# Patient Record
Sex: FEMALE | Race: WHITE | NOT HISPANIC OR LATINO | Employment: OTHER | URBAN - METROPOLITAN AREA
[De-identification: names, ages, dates, MRNs, and addresses within clinical notes are randomized per-mention and may not be internally consistent; named-entity substitution may affect disease eponyms.]

---

## 2017-05-03 ENCOUNTER — GENERIC CONVERSION - ENCOUNTER (OUTPATIENT)
Dept: OTHER | Facility: OTHER | Age: 67
End: 2017-05-03

## 2017-07-05 ENCOUNTER — GENERIC CONVERSION - ENCOUNTER (OUTPATIENT)
Dept: OTHER | Facility: OTHER | Age: 67
End: 2017-07-05

## 2017-10-16 ENCOUNTER — GENERIC CONVERSION - ENCOUNTER (OUTPATIENT)
Dept: OTHER | Facility: OTHER | Age: 67
End: 2017-10-16

## 2017-11-15 ENCOUNTER — GENERIC CONVERSION - ENCOUNTER (OUTPATIENT)
Dept: OTHER | Facility: OTHER | Age: 67
End: 2017-11-15

## 2017-11-27 ENCOUNTER — ALLSCRIPTS OFFICE VISIT (OUTPATIENT)
Dept: OTHER | Facility: OTHER | Age: 67
End: 2017-11-27

## 2017-11-27 DIAGNOSIS — E55.9 VITAMIN D DEFICIENCY: ICD-10-CM

## 2017-11-27 DIAGNOSIS — R10.9 ABDOMINAL PAIN: ICD-10-CM

## 2017-11-27 DIAGNOSIS — E78.5 HYPERLIPIDEMIA: ICD-10-CM

## 2017-11-28 NOTE — PROGRESS NOTES
Assessment    1  NUD (nonulcer dyspepsia) (536 8) (K30)  2  Joint pain, knee (719 46) (M25 569)  3  Hyperlipidemia (272 4) (E78 5)  4  Vitamin D deficiency (268 9) (E55 9)  5  Need for influenza vaccination (V04 81) (Z23)  6  Need for pneumococcal vaccination (V03 82) (Z23)   1  Health maintenance-defers an influenza vaccine today but says she will return for over the next several weeks 2  Health maintenance-should also receive pneumococcal 23 vaccine at the time of her influenza vaccine 3  Health maintenance-again recommended she obtain herpes zoster vaccine-she has a prescription for this 4  Knee pain-saw Dr Angel Zimmer  MRI of left knee show severe patellofemoral arthritis, moderate to severe medial medial arthritis with some AVM along larger weight-bearing surface of the medial femoral condyle in some bone edema  She had a Visco elastic injection  She wants to resume swimming  She will be deferring surgery until she has more pain  We talked about swimming and knee strengthening exercises 5  Abnormal mammogram-ultrasound the right breast on April 2017 pgzvmggqb-[id number]-cy mammogram in July 17 shows stable mass in the right breast compatible with mildly complicated/septated cyst-he recommended mammogram-ultrasound in the year which will be due in July 2018 6  Dyspepsia-had recent ultrasound gallbladder and pancreas via GI doctor was normal  Supposedly endoscopy and colonoscopy normal  We will be obtaining results of that with further therapy based on results 7  Low vitamin-D-again recommended vitamin D3/2000 units a day 8 history mild allergies as well as some associated low-grade asthma  Does have exercise-induced asthma  I previously offered her rescue in her low but she deferred #9 history of ovarian cyst-as per GYN #10 family history cancer the colon-her mother-last colonoscopy over 5 years ago   As noted had recent colonoscopy with Dr Jamie Kaye and tracking down results #11 herpes simplex-intermittent uses Valtrex #12 history of lower abdominal pain-resolved that she is always fearful ovarian cancer  Prior serum CA-125 neida #13 leg edema-felt to be from peripheral venous disease  Encouraged her to use support stockings  MEDICAL REGIMEN: vitamin D 3/2000 units a day l  Appointment 12 months with prior chemistry profile cholesterol profile vitamin-D level  Addendum-obtain results from Dr India Rocha in November 2017 shows normal esophagus, nonerosive gastritis and normal duodenum  Colonoscopy normal with repeat recommended in 5 YEARS-DAVID CHART NEXT VISIT     Plan  Influenza vaccine recommended  Pneumococcal 23 vaccine recommended  Herpes zoster vaccine recommended  Call if increasing upper tract symptoms  Vitamin D3 dose of 2000 units a day recommended   History of Present Illness  HPI: Reviewed multiple issues  She was having intermittent dyspepsia with certain foods  She said she would have bloating and fullness without definite pain  She had an ultrasound performed by Dr Roxana Zazueta was total was benign  She had an endoscopy colonoscopy was told both of these were benign  She says at this point her symptoms are overall improved not have frequent issue  She has occasional pyrosis but not frequently  She denies difficulty swallowing  testing shows chemistry profile normal HDL 98 LDL 85 triglycerides 36 vitamin-D level 23 CBC normal  She has not been faithful in vitamin-D supplementation will initiate that  talked about degenerative arthritis of her knees  Says she has minimal pain  She was told by 2 orthopedic physician she will need a total knee replacement in the future-she wants to hold off on that at present   had follow-up radiographic studies done  Ultrasound the right breast done in April 2017 unchanged  Mammogram done in July 2017 showed stable mass of the right breast compatible with mildly complicated/septated cyst versus a group of CIS   Follow-up mammogram recommended in 1 year which would be July 2018      has a history of some seasonal allergies  Not a major issue  Also has exercise-induced asthma  I previously offered her rescue inhaler but she deferred  patient denies any systemic symptoms  Specifically there has been no evidence of fever, night sweats, significant weight loss or significant decrease in appetite  patient wanted to know their preferred analgesic agent  Because of their various comorbidities I recommended that this be acetaminophen  This patient has no history of chronic liver disease that would put them at greater risk for use of acetaminophen  This patient may use up to 500-650 mg of acetaminophen at a time and no more than 3 g a day total  Nonsteroidal anti-inflammatory agents have the potential to exacerbate hypertension, hypercoagulability, chronic renal failure, congestive heart failure, and various allergic tendencies  Because of her comorbidities and upper tract symptoms we wanted to use acetaminophen rather than nonsteroidals      Active Problems  1  Abdominal pain (789 00) (R10 9)  2  Asthma (493 90) (J45 909)  3  Cough (786 2) (R05)  4  Edema (782 3) (R60 9)  5  Fatigue (780 79) (R53 83)  6  Hyperlipidemia (272 4) (E78 5)  7  Ichthyosis (757 1) (Q80 9)  8  Joint pain, knee (719 46) (M25 569)  9  Low back pain (724 2) (M54 5)  10  Lumbar radiculopathy (724 4) (M54 16)  11  Need for influenza vaccination (V04 81) (Z23)  12  Need for pneumococcal vaccination (V03 82) (Z23)  13  NUD (nonulcer dyspepsia) (536 8) (K30)  14  Palpitations (785 1) (R00 2)  15  Prehypertension (796 2) (R03 0)  16  Vitamin D deficiency (268 9) (E55 9)    Past Medical History  1  History of Long term use of drug (V58 69) (Z79 899)  2  History of Pertussis exposure (V01 89) (Z20 818)  Active Problems And Past Medical History Reviewed: The active problems and past medical history were reviewed and updated today  Surgical History  Surgical History Reviewed:    The surgical history was reviewed and updated today  Family History  Mother   1  Family history of Cancer, colon    Social History     · Being A Social Drinker   · Drug Use (305 90)   · Former smoker (B29 30) (Z21 361)  Social History Reviewed: The social history was reviewed and updated today  The social history was reviewed and is unchanged  Current Meds  1  Azithromycin 250 MG Oral Tablet (Zithromax Z-Hari); TAKE AS DIRECTED; Therapy: 02BCH2845 to (Last Rx:93Ncm7357)  Requested for: 69Sog9057 Ordered  2  DrRx Medrol Dose Pack 4 MG #21; use as directed; Therapy: 63GXP3704 to (Last Rx:40Dcx1566) Ordered  Medication List Reviewed: The medication list was reviewed and updated today  Allergies  1  No Known Drug Allergies    Vitals  Vital Signs    Recorded: 75FBP8860 10:37AM Recorded: 40VNT3573 10:08AM   Heart Rate 78    Respiration 14    Systolic 847, RUE, Sitting    Diastolic 78, RUE, Sitting    Height  5 ft 6 in   Weight  159 lb 4 oz   BMI Calculated  25 7   BSA Calculated  1 82       Physical Exam   Constitutional  General appearance: No acute distress, well appearing and well nourished  Head and Face  Head and face: Normal    Palpation of the face and sinuses: No sinus tenderness  Eyes  Conjunctiva and lids: No swelling, erythema or discharge  Pupils and irises: Equal, round, reactive to light  Ears, Nose, Mouth, and Throat  External inspection of ears and nose: Normal    Otoscopic examination: Tympanic membranes translucent with normal light reflex  Canals patent without erythema  Hearing: Normal    Nasal mucosa, septum, and turbinates: Normal without edema or erythema  Lips, teeth, and gums: Normal, good dentition  Oropharynx: Normal with no erythema, edema, exudate or lesions  Neck  Neck: Supple, symmetric, trachea midline, no masses  Thyroid: Normal, no thyromegaly  Pulmonary  Respiratory effort: No increased work of breathing or signs of respiratory distress     Percussion of chest: Normal    Palpation of chest: Normal    Auscultation of lungs: Clear to auscultation  Cardiovascular  Palpation of heart: Normal PMI, no thrills  Auscultation of heart: Normal rate and rhythm, normal S1 and S2, no murmurs  Carotid pulses: 2+ bilaterally  Abdominal aorta: Normal    Femoral pulses: 2+ bilaterally  Pedal pulses: 2+ bilaterally  Peripheral vascular exam: Normal    Examination of extremities for edema and/or varicosities: Abnormal  -- Peripheral venous disease  Chest  Breasts: Normal, no dimpling or skin changes appreciated  Palpation of breasts and axillae: Normal, no masses palpated  Abdomen  Abdomen: Non-tender, no masses  Liver and spleen: No hepatomegaly or splenomegaly  Examination for hernias: No hernia appreciated  Lymphatic  Palpation of lymph nodes in neck: No lymphadenopathy  Palpation of lymph nodes in axillae: No lymphadenopathy  Palpation of lymph nodes in groin: No lymphadenopathy  Palpation of lymph nodes in other areas: No lymphadenopathy  Musculoskeletal  Gait and station: Normal    Digits and nails: Normal without clubbing or cyanosis  Joints, bones, and muscles: Abnormal  -- Small cool effusion of the left knee with crepitance on range of motion of both knees  Negative straight leg raise bilaterally  Range of motion: Normal    Stability: Normal    Muscle strength/tone: Normal    Skin  Skin and subcutaneous tissue: Normal without rashes or lesions  Examination of the skin for lesions: Abnormal  -- Benign seborrheic keratoses-ichthyosis  Palpation of skin and subcutaneous tissue: Normal turgor  Neurologic  Cranial nerves: Cranial nerves II-XII intact  Cortical function: Normal mental status  Reflexes: 2+ and symmetric  Sensation: No sensory loss  Coordination: Normal finger to nose and heel to shin  Psychiatric  Judgment and insight: Normal    Orientation to person, place, and time: Normal    Recent and remote memory: Intact     Mood and affect: Normal  Health Management  History of Encounter for screening mammogram for breast cancer   Digital Bilateral Screening Mammogram With CAD; every 1 year; Next Due: 28QRD8483; Overdue  History of Screening for colorectal cancer   COLONOSCOPY; every 10 years; Next Due: 31Hfx1559;  Overdue    Signatures   Electronically signed by : Rudi Dubin, M D ; Nov 27 2017 10:46AM EST                       (Author)    Electronically signed by : Rudi Dubin, M D ; Nov 30 2017  1:28PM EST                       (Author)

## 2018-01-13 VITALS
WEIGHT: 159.25 LBS | HEART RATE: 78 BPM | HEIGHT: 66 IN | SYSTOLIC BLOOD PRESSURE: 120 MMHG | BODY MASS INDEX: 25.59 KG/M2 | RESPIRATION RATE: 14 BRPM | DIASTOLIC BLOOD PRESSURE: 78 MMHG

## 2018-11-27 ENCOUNTER — OFFICE VISIT (OUTPATIENT)
Dept: INTERNAL MEDICINE CLINIC | Facility: CLINIC | Age: 68
End: 2018-11-27
Payer: MEDICARE

## 2018-11-27 VITALS
SYSTOLIC BLOOD PRESSURE: 118 MMHG | DIASTOLIC BLOOD PRESSURE: 86 MMHG | HEART RATE: 63 BPM | BODY MASS INDEX: 25.62 KG/M2 | OXYGEN SATURATION: 99 % | WEIGHT: 163.2 LBS | HEIGHT: 67 IN | RESPIRATION RATE: 14 BRPM

## 2018-11-27 DIAGNOSIS — Z23 NEED FOR PROPHYLACTIC VACCINATION WITH STREPTOCOCCUS PNEUMONIAE (PNEUMOCOCCUS) AND INFLUENZA VACCINES: ICD-10-CM

## 2018-11-27 DIAGNOSIS — E55.9 VITAMIN D DEFICIENCY: ICD-10-CM

## 2018-11-27 DIAGNOSIS — J30.89 ENVIRONMENTAL AND SEASONAL ALLERGIES: ICD-10-CM

## 2018-11-27 DIAGNOSIS — Z13.29 SCREENING FOR THYROID DISORDER: ICD-10-CM

## 2018-11-27 DIAGNOSIS — Z23 INFLUENZA VACCINE NEEDED: Primary | ICD-10-CM

## 2018-11-27 DIAGNOSIS — Z13.1 SCREENING FOR DIABETES MELLITUS: ICD-10-CM

## 2018-11-27 DIAGNOSIS — Z13.220 SCREENING FOR HYPERLIPIDEMIA: ICD-10-CM

## 2018-11-27 PROCEDURE — G0008 ADMIN INFLUENZA VIRUS VAC: HCPCS

## 2018-11-27 PROCEDURE — 99214 OFFICE O/P EST MOD 30 MIN: CPT | Performed by: INTERNAL MEDICINE

## 2018-11-27 PROCEDURE — G0009 ADMIN PNEUMOCOCCAL VACCINE: HCPCS

## 2018-11-27 PROCEDURE — 90662 IIV NO PRSV INCREASED AG IM: CPT

## 2018-11-27 PROCEDURE — 90732 PPSV23 VACC 2 YRS+ SUBQ/IM: CPT

## 2018-11-27 NOTE — PROGRESS NOTES
Assessment/Plan:    #Seborrheic Keratosis  -noted on b/l temporal areas    #Verruca  -noted on left temporal area with mild pain and discomfort  -scheduled to see dermatology for evaluation in January    #B/L thumb Weakness  -states that weakness started months ago  -has difficulty opening up a jar  -was a former   -states that there is no pain or numbness  -offered to obtain XR however patient states that it is likely arthritis and she will notify us if it worsens    #Left Knee Osteoarthritis  -evaluated by ortho in the past  -received injection 2 years ago  -plans to go back to ortho for injection  -states she is trying to defer surgery for as long as possible due to concern over joint complications including infection    #Vitamin D Deficiency  -low at 21  -takes OTC supplement  -advised patient to take 1000 units daily in addition to current supplementation    #Seasonal Allergies  -reports chronic runny nose  -will obtain allergy panel    #HLD  -LDL 96 and   -controlled on omega 3  -patinet reports diarrhea with omega 3 and informed her to cut down on omega 3    #Health Maintenance  -return to care in 1 year with routine labs  -mammogram performed 2018 and up to date along with US of breast  -sees Ob/gyn for annual pap and pelvic  -colonoscopy and EGD done 2017 and due in 2022  -pneumovax 23 given 2018  -fu vaccine up to date 2018  -shingrix vaccine awaiting inventory at pharmacy  -deferred on DEXA scan, will need to address at next visit    No problem-specific Assessment & Plan notes found for this encounter  Diagnoses and all orders for this visit:    Influenza vaccine needed  -     CBC and differential; Future  -     Vitamin D 25 hydroxy; Future  -     Lipid Panel with Direct LDL reflex; Future  -     Hemoglobin A1C; Future  -     TSH, 3rd generation with Free T4 reflex; Future  -     Comprehensive metabolic panel;  Future  -     influenza vaccine, 6032-1350, high-dose, PF 0 5 mL, for patients 72 yr+ (FLUZONE HIGH-DOSE)    Screening for diabetes mellitus  -     CBC and differential; Future  -     Vitamin D 25 hydroxy; Future  -     Lipid Panel with Direct LDL reflex; Future  -     Hemoglobin A1C; Future  -     TSH, 3rd generation with Free T4 reflex; Future  -     Comprehensive metabolic panel; Future    Screening for hyperlipidemia  -     CBC and differential; Future  -     Vitamin D 25 hydroxy; Future  -     Lipid Panel with Direct LDL reflex; Future  -     Hemoglobin A1C; Future  -     TSH, 3rd generation with Free T4 reflex; Future  -     Comprehensive metabolic panel; Future    Screening for thyroid disorder  -     CBC and differential; Future  -     Vitamin D 25 hydroxy; Future  -     Lipid Panel with Direct LDL reflex; Future  -     Hemoglobin A1C; Future  -     TSH, 3rd generation with Free T4 reflex; Future  -     Comprehensive metabolic panel; Future    Vitamin D deficiency  -     CBC and differential; Future  -     Vitamin D 25 hydroxy; Future  -     Lipid Panel with Direct LDL reflex; Future  -     Hemoglobin A1C; Future  -     TSH, 3rd generation with Free T4 reflex; Future  -     Comprehensive metabolic panel; Future    Environmental and seasonal allergies  -     CBC and differential; Future  -     Vitamin D 25 hydroxy; Future  -     Lipid Panel with Direct LDL reflex; Future  -     Hemoglobin A1C; Future  -     TSH, 3rd generation with Free T4 reflex; Future  -     Comprehensive metabolic panel; Future  -     Allergy Evaluation 1, [place]; Future    Need for prophylactic vaccination with Streptococcus pneumoniae (Pneumococcus) and Influenza vaccines  -     PNEUMOCOCCAL POLYSACCHARIDE VACCINE 23-VALENT =>3YO SQ IM          No current outpatient prescriptions on file  Subjective:      Patient ID: Abeba Cruz is a 80 y o  female  HPI     Patient presents for a routine visit  Denies any recent hospitalizations or surgeries    States that her left knee has been painful however she has been putting off surgery because she is worried about potential complications including infection  She states that she received a joint injection about 2 years ago with mild relief and that we suggested that she return for repeat injection since her knee is still bothering her  She states that she will continue to defer surgery for as long as possible  Patient also complains of bilateral thumb pain and stated that it has been difficult for her to open a container  Muscle strength was intact and tendons appear to be intact  We offered to obtain x-rays however she deferred this stating that she knows that it is likely due to osteoarthritis at this time  She will continue to monitor and inform us if her symptoms worsen  Patient also complains of left lower extremity numbness and neuropathy secondary to a previous lower back injury from lifting up her father in law  States that this is stable and that she worked with physical therapy in the past and has regained significant range of motion and mobility  She will continue to monitor and to continue to exercise on a daily basis  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  and states that she walks every day and does a lot of yard work around [employer]  Patient's LDL is 96 and   She is currently taking Omega 3 fish oil and states that she is having some loose bowel movements  We informed her that she should start either tapering off the fish oil or cut it out completely  She will make a decision regarding what she wants to do later on  Patient also states that she continues take vitamin-D multivitamin however her vitamin-D was noted to be 21 and we informed her to take a daily supplement of 1000 units daily  In 2017 she had a colonoscopy and an upper endoscopy without any significant findings  She is due for repeat in 2022   She states that she has a history of gastritis and that soda and citrus fruits bother her however by avoiding these things her symptoms go away  Patient also complains of chronic sinusitis and sinus drainage and we will obtain an immuno cap  She states that she had a bird in the past and though she was allergic to that however after the her bird , the symptoms did not significantly improve  Patient also was noted to have verruca noted on her left temporal area and she plans to see Dermatology in January for evaluation and treatment  There area also seborrheic keratosis on b/l temporal areas as well  Patient states that she has never received a DEXA scan and will deferred at this time  We informed her the risk of osteoporosis and the risk of spinal fracture and hip fracture as she ages  She is aware of the risks and states that she will consider it at the next visit  She received a flu vaccine and the pneumonia vaccine today  She will return in 1 year with routine labs  The following portions of the patient's history were reviewed and updated as appropriate: allergies, current medications, past family history, past medical history, past social history, past surgical history and problem list     Review of Systems   Constitutional: Negative for activity change, appetite change, chills, fatigue and fever  HENT: Positive for rhinorrhea  Negative for congestion, ear pain, facial swelling, hearing loss, sore throat, tinnitus and trouble swallowing  Eyes: Negative for photophobia and visual disturbance  Respiratory: Negative for cough, shortness of breath and wheezing  Cardiovascular: Negative for chest pain and leg swelling  Gastrointestinal: Negative for abdominal distention, abdominal pain, blood in stool, nausea and vomiting  Genitourinary: Negative for difficulty urinating, dysuria and pelvic pain  Musculoskeletal: Positive for arthralgias (let knee pain) and back pain (lumbar)  Negative for joint swelling  Skin: Positive for rash (rash on b/l sides of head)   Negative for wound  Neurological: Negative for dizziness, tremors, light-headedness and headaches  Objective:      /86 (Cuff Size: Standard)   Pulse 63   Resp 14   Ht 5' 7" (1 702 m)   Wt 74 kg (163 lb 3 2 oz)   SpO2 99%   BMI 25 56 kg/m²          Physical Exam   Constitutional: She is oriented to person, place, and time  She appears well-developed and well-nourished  HENT:   Head: Normocephalic and atraumatic  Right Ear: External ear normal    Left Ear: External ear normal    Nose: Nose normal    Mouth/Throat: Oropharynx is clear and moist    Moist turbinates   Eyes: Pupils are equal, round, and reactive to light  Conjunctivae and EOM are normal    Neck: Normal range of motion  Neck supple  No JVD present  No thyromegaly present  Cardiovascular: Normal rate, regular rhythm, normal heart sounds and intact distal pulses  No murmur heard  Pulmonary/Chest: Effort normal and breath sounds normal  No stridor  No respiratory distress  She has no wheezes  Abdominal: Soft  Bowel sounds are normal  She exhibits no distension  There is no tenderness  There is no rebound and no guarding  Musculoskeletal: Normal range of motion  She exhibits tenderness (left knee medial meniscus, lumbar back)  She exhibits no edema or deformity  Lymphadenopathy:     She has no cervical adenopathy  Neurological: She is alert and oriented to person, place, and time  She has normal reflexes  Skin: Skin is warm  Rash (seborrheic keratosis b/l temporal area, verruca on left temporal area) noted  No erythema  Vitals reviewed

## 2019-04-09 ENCOUNTER — TELEPHONE (OUTPATIENT)
Dept: INTERNAL MEDICINE CLINIC | Facility: CLINIC | Age: 69
End: 2019-04-09

## 2019-04-15 ENCOUNTER — OFFICE VISIT (OUTPATIENT)
Dept: INTERNAL MEDICINE CLINIC | Facility: CLINIC | Age: 69
End: 2019-04-15
Payer: MEDICARE

## 2019-04-15 VITALS
SYSTOLIC BLOOD PRESSURE: 124 MMHG | DIASTOLIC BLOOD PRESSURE: 80 MMHG | HEIGHT: 67 IN | WEIGHT: 165.4 LBS | RESPIRATION RATE: 16 BRPM | HEART RATE: 88 BPM | BODY MASS INDEX: 25.96 KG/M2 | OXYGEN SATURATION: 98 %

## 2019-04-15 DIAGNOSIS — K29.50 OTHER CHRONIC GASTRITIS WITHOUT HEMORRHAGE: ICD-10-CM

## 2019-04-15 DIAGNOSIS — M81.0 AGE-RELATED OSTEOPOROSIS WITHOUT CURRENT PATHOLOGICAL FRACTURE: ICD-10-CM

## 2019-04-15 DIAGNOSIS — Z13.820 SCREENING FOR OSTEOPOROSIS: Primary | ICD-10-CM

## 2019-04-15 PROCEDURE — G0402 INITIAL PREVENTIVE EXAM: HCPCS | Performed by: INTERNAL MEDICINE

## 2019-04-15 RX ORDER — METHYLPREDNISOLONE 4 MG/1
TABLET ORAL
COMMUNITY
Start: 2015-06-16 | End: 2019-11-25

## 2019-04-15 RX ORDER — AZITHROMYCIN 250 MG/1
TABLET, FILM COATED ORAL
COMMUNITY
Start: 2016-02-12 | End: 2019-11-25

## 2019-04-15 RX ORDER — MULTIVIT-MIN/IRON/FOLIC ACID/K 18-600-40
CAPSULE ORAL
COMMUNITY
Start: 2013-12-06 | End: 2019-04-15

## 2019-04-17 ENCOUNTER — TELEPHONE (OUTPATIENT)
Dept: INTERNAL MEDICINE CLINIC | Facility: CLINIC | Age: 69
End: 2019-04-17

## 2019-04-18 RX ORDER — FAMOTIDINE 40 MG/1
40 TABLET, FILM COATED ORAL 2 TIMES DAILY PRN
Qty: 60 TABLET | Refills: 0 | Status: SHIPPED | OUTPATIENT
Start: 2019-04-18 | End: 2019-11-25

## 2019-07-23 LAB — NEGATIVE CONTROL: NEGATIVE

## 2019-11-21 LAB
HBA1C MFR BLD HPLC: 5.6 %
HBA1C MFR BLD HPLC: 5.6 %

## 2019-11-25 ENCOUNTER — OFFICE VISIT (OUTPATIENT)
Dept: INTERNAL MEDICINE CLINIC | Facility: CLINIC | Age: 69
End: 2019-11-25
Payer: MEDICARE

## 2019-11-25 VITALS
WEIGHT: 166.4 LBS | BODY MASS INDEX: 26.12 KG/M2 | HEART RATE: 74 BPM | HEIGHT: 67 IN | RESPIRATION RATE: 15 BRPM | DIASTOLIC BLOOD PRESSURE: 78 MMHG | OXYGEN SATURATION: 98 % | SYSTOLIC BLOOD PRESSURE: 120 MMHG

## 2019-11-25 DIAGNOSIS — B00.9 HERPES INFECTION: ICD-10-CM

## 2019-11-25 DIAGNOSIS — Z13.29 SCREENING FOR THYROID DISORDER: ICD-10-CM

## 2019-11-25 DIAGNOSIS — Z23 INFLUENZA VACCINE NEEDED: ICD-10-CM

## 2019-11-25 DIAGNOSIS — Z01.419 ENCOUNTER FOR ANNUAL ROUTINE GYNECOLOGICAL EXAMINATION: ICD-10-CM

## 2019-11-25 DIAGNOSIS — E78.2 MODERATE MIXED HYPERLIPIDEMIA NOT REQUIRING STATIN THERAPY: ICD-10-CM

## 2019-11-25 DIAGNOSIS — E55.9 VITAMIN D DEFICIENCY: Primary | ICD-10-CM

## 2019-11-25 PROCEDURE — G0008 ADMIN INFLUENZA VIRUS VAC: HCPCS

## 2019-11-25 PROCEDURE — G0439 PPPS, SUBSEQ VISIT: HCPCS

## 2019-11-25 PROCEDURE — 90662 IIV NO PRSV INCREASED AG IM: CPT

## 2019-11-25 RX ORDER — ACYCLOVIR 400 MG/1
400 TABLET ORAL 3 TIMES DAILY
Qty: 90 TABLET | Refills: 1 | Status: SHIPPED | OUTPATIENT
Start: 2019-11-25 | End: 2019-12-25

## 2019-11-25 NOTE — PROGRESS NOTES
Assessment/Plan:    #Right Temporal Head Pain  -reports dog jumped up and hit her face along right temporal area  -denies LOC  -currently improving  -to continue with ice therapy    #Right Foot Tendinitis  -present since July and saw orthopedics  -fitted for orthotic and bracing with relief only to trip in a hole and reinjure  -reports symptoms are slowly improving again    #Epigastric Abdominal Pain  -pressure like with bloating  -worsens after meals  -2017 colonoscopy and EGD were normal  -currently resolved with avoiding acidic foods    #Epigastric Fullness  -improved with decreasing portion sizes    #Seborrheic Keratosis  -noted on b/l temporal areas    #Verruca  -on left temporal area  -seeing dermatology    #B/L thumb Weakness  -states that weakness started months ago  -has difficulty opening up a jar  -was a former   -weakness persists but is unchanged    #Left Knee Osteoarthritis  -evaluated by ortho in the past  -received injection 2 years ago  -reports symptoms are controlled without need for injections at this time    #Vitamin D Deficiency  -noncompliant with vitamin D  -will recheck level    #Prediabtes  -a1c at 5 6  -stable    #Seasonal Allergies  -reports chronic runny nose  -allergy panel was negative    #HLD  - and HDL 93  -no longer on omega 3    #Health Maintenance  -return to care in 1 year with routine labs  -mammogram performed 2019 and up to date along with US of breast  -referral for Ob/gyn for annual pap and pelvic given  -colonoscopy and EGD done 2017 and due in 2022  -pneumovax 23 given 2018, PNA 13 2016  -fu vaccine up to date 2019  -shingrix vaccine script given to patient  -patient defers DEXA    No problem-specific Assessment & Plan notes found for this encounter  Diagnoses and all orders for this visit:    Vitamin D deficiency  -     CBC and differential; Future  -     Lipid Panel with Direct LDL reflex;  Future  -     TSH, 3rd generation with Free T4 reflex; Future  -     Comprehensive metabolic panel; Future  -     Vitamin D 25 hydroxy; Future    Moderate mixed hyperlipidemia not requiring statin therapy  -     CBC and differential; Future  -     Lipid Panel with Direct LDL reflex; Future  -     TSH, 3rd generation with Free T4 reflex; Future  -     Comprehensive metabolic panel; Future  -     Vitamin D 25 hydroxy; Future    Screening for thyroid disorder  -     CBC and differential; Future  -     Lipid Panel with Direct LDL reflex; Future  -     TSH, 3rd generation with Free T4 reflex; Future  -     Comprehensive metabolic panel; Future  -     Vitamin D 25 hydroxy; Future    Influenza vaccine needed  -     influenza vaccine, [id number], high-dose, PF 0 5 mL (FLUZONE HIGH-DOSE)  -     CBC and differential; Future  -     Lipid Panel with Direct LDL reflex; Future  -     TSH, 3rd generation with Free T4 reflex; Future  -     Comprehensive metabolic panel; Future  -     Vitamin D 25 hydroxy; Future    Encounter for annual routine gynecological examination  -     Ambulatory referral to Gynecology; Future    Other orders  -     Cancel: Mammo screening bilateral w cad; Future            Current Outpatient Medications:     Cholecalciferol 2000 units CAPS, Take 2,000 Units by mouth, Disp: , Rfl:     Subjective:      Patient ID: Taylor Saucedo is a 77 y o  female  GI Problem   The primary symptoms include arthralgias (right foot pain)  Primary symptoms do not include fever, fatigue, abdominal pain, nausea, vomiting, dysuria, myalgias or rash  The illness does not include chills or back pain  Patient presents for routine visit  Denies any recent hospitalizations or surgeries  States that her epigastric abdominal pain has resolved with changing up her diet  She no longer drinks ice tea or any limb any to any acidic foods  She does acknowledge in on bloating sensation whenever she eats a large amount of food    She states that this improves whenever she decreases the amount of food that she is eating  Her symptoms are likely due to a hiatal hernia and we encouraged her to continue to cut back  She had an EGD in the past in 2017 which was normal   Patient complains of right foot tendinitis over the medial malleolus since July  She states that she continues to see follow-up will up with Orthopedics and recently was placed in an orthotic as well as a boot which has been helping  Last week she stepped in a ground home alcohol and re-injured her foot  She states that this week it is feeling much better and she will continue to monitor for any signs of change  Of note patient reports that her son's 85 lb dog jumped up and hit her in the right frontal orbital area  She states that she felt some slight pain over the area however did not pass out or have any changes in vision  She states that it is slightly getting better  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 and as result we refilled her acyclovir  Patient completed routine labs revealing A1c at 6, , HDL 93 and we encouraged her to diet and exercise  She states that her weight has increased up to 166 lb because she cannot walk as well as she used to  We encouraged her to swim or bike for exercise  She received her flu vaccine today she will return to care in 1 year with labs  The following portions of the patient's history were reviewed and updated as appropriate: allergies, current medications, past family history, past medical history, past social history, past surgical history and problem list     Review of Systems   Constitutional: Negative for activity change, appetite change, chills, fatigue and fever  HENT: Negative for congestion, ear pain, facial swelling, hearing loss, sore throat, tinnitus and trouble swallowing  Eyes: Negative for photophobia and visual disturbance  Respiratory: Negative for cough, shortness of breath and wheezing      Cardiovascular: Negative for chest pain, palpitations and leg swelling  Gastrointestinal: Positive for abdominal distention (abdominal fullness)  Negative for abdominal pain, blood in stool, nausea and vomiting  Genitourinary: Negative for difficulty urinating, dysuria and pelvic pain  Musculoskeletal: Positive for arthralgias (right foot pain) and gait problem (mild gait issues with right foot pain)  Negative for back pain, joint swelling, myalgias, neck pain and neck stiffness  Skin: Negative for rash and wound  Neurological: Negative for dizziness, tremors, seizures, weakness, light-headedness, numbness and headaches  Objective:      /78 (BP Location: Left arm, Patient Position: Sitting, Cuff Size: Adult)   Pulse 74   Resp 15   Ht 5' 7" (1 702 m)   Wt 75 5 kg (166 lb 6 4 oz)   SpO2 98%   BMI 26 06 kg/m²          Physical Exam   Constitutional: She is oriented to person, place, and time  She appears well-developed and well-nourished  HENT:   Head: Normocephalic and atraumatic  Right Ear: External ear normal    Left Ear: External ear normal    Nose: Nose normal    Mouth/Throat: Oropharynx is clear and moist    Eyes: Pupils are equal, round, and reactive to light  Conjunctivae and EOM are normal    Neck: Normal range of motion  Neck supple  No JVD present  No thyromegaly present  Cardiovascular: Normal rate, regular rhythm, normal heart sounds and intact distal pulses  No murmur heard  Pulmonary/Chest: Effort normal and breath sounds normal  No stridor  No respiratory distress  She has no wheezes  Abdominal: Soft  Bowel sounds are normal  She exhibits no distension and no mass  There is no tenderness  There is no rebound and no guarding  Musculoskeletal: Normal range of motion  She exhibits edema (trace edema right medial malleolus) and tenderness (right medial malleolus)  She exhibits no deformity  Lymphadenopathy:     She has no cervical adenopathy     Neurological: She is alert and oriented to person, place, and time  She has normal reflexes  Skin: Skin is warm  Rash (verruca noted on left temporal area) noted  No erythema  Vitals reviewed

## 2020-05-20 ENCOUNTER — OFFICE VISIT (OUTPATIENT)
Dept: OBGYN CLINIC | Facility: CLINIC | Age: 70
End: 2020-05-20
Payer: MEDICARE

## 2020-05-20 VITALS
HEIGHT: 67 IN | BODY MASS INDEX: 26.02 KG/M2 | SYSTOLIC BLOOD PRESSURE: 146 MMHG | TEMPERATURE: 98.4 F | WEIGHT: 165.8 LBS | DIASTOLIC BLOOD PRESSURE: 90 MMHG

## 2020-05-20 DIAGNOSIS — R10.2 PELVIC PAIN: Primary | ICD-10-CM

## 2020-05-20 PROCEDURE — 1160F RVW MEDS BY RX/DR IN RCRD: CPT | Performed by: OBSTETRICS & GYNECOLOGY

## 2020-05-20 PROCEDURE — 1036F TOBACCO NON-USER: CPT | Performed by: OBSTETRICS & GYNECOLOGY

## 2020-05-20 PROCEDURE — 99203 OFFICE O/P NEW LOW 30 MIN: CPT | Performed by: OBSTETRICS & GYNECOLOGY

## 2020-05-20 PROCEDURE — 3080F DIAST BP >= 90 MM HG: CPT | Performed by: OBSTETRICS & GYNECOLOGY

## 2020-05-20 PROCEDURE — 4040F PNEUMOC VAC/ADMIN/RCVD: CPT | Performed by: OBSTETRICS & GYNECOLOGY

## 2020-05-20 PROCEDURE — 3008F BODY MASS INDEX DOCD: CPT | Performed by: OBSTETRICS & GYNECOLOGY

## 2020-05-20 PROCEDURE — 3077F SYST BP >= 140 MM HG: CPT | Performed by: OBSTETRICS & GYNECOLOGY

## 2020-05-27 ENCOUNTER — HOSPITAL ENCOUNTER (OUTPATIENT)
Dept: RADIOLOGY | Age: 70
Discharge: HOME | End: 2020-05-27
Payer: MEDICARE

## 2020-05-27 DIAGNOSIS — R10.2 PELVIC PAIN: ICD-10-CM

## 2020-05-27 PROCEDURE — 76856 US EXAM PELVIC COMPLETE: CPT

## 2020-05-27 PROCEDURE — 76830 TRANSVAGINAL US NON-OB: CPT

## 2020-06-01 ENCOUNTER — TELEPHONE (OUTPATIENT)
Dept: OBGYN CLINIC | Facility: CLINIC | Age: 70
End: 2020-06-01

## 2020-10-14 ENCOUNTER — CLINICAL SUPPORT (OUTPATIENT)
Dept: INTERNAL MEDICINE CLINIC | Facility: CLINIC | Age: 70
End: 2020-10-14
Payer: MEDICARE

## 2020-10-14 DIAGNOSIS — Z23 ENCOUNTER FOR IMMUNIZATION: Primary | ICD-10-CM

## 2020-10-14 PROCEDURE — 90662 IIV NO PRSV INCREASED AG IM: CPT

## 2020-10-14 PROCEDURE — G0008 ADMIN INFLUENZA VIRUS VAC: HCPCS

## 2020-11-05 ENCOUNTER — OFFICE VISIT (OUTPATIENT)
Dept: INTERNAL MEDICINE CLINIC | Facility: CLINIC | Age: 70
End: 2020-11-05
Payer: MEDICARE

## 2020-11-05 VITALS
HEART RATE: 80 BPM | OXYGEN SATURATION: 99 % | WEIGHT: 165 LBS | BODY MASS INDEX: 25.9 KG/M2 | HEIGHT: 67 IN | RESPIRATION RATE: 15 BRPM | SYSTOLIC BLOOD PRESSURE: 120 MMHG | DIASTOLIC BLOOD PRESSURE: 60 MMHG

## 2020-11-05 DIAGNOSIS — H66.90 ACUTE OTITIS MEDIA, UNSPECIFIED OTITIS MEDIA TYPE: ICD-10-CM

## 2020-11-05 DIAGNOSIS — H61.23 BILATERAL IMPACTED CERUMEN: ICD-10-CM

## 2020-11-05 DIAGNOSIS — J01.41 ACUTE RECURRENT PANSINUSITIS: Primary | ICD-10-CM

## 2020-11-05 PROCEDURE — 99214 OFFICE O/P EST MOD 30 MIN: CPT | Performed by: INTERNAL MEDICINE

## 2020-11-05 RX ORDER — NEOMYCIN SULFATE, POLYMYXIN B SULFATE AND HYDROCORTISONE 10; 3.5; 1 MG/ML; MG/ML; [USP'U]/ML
4 SUSPENSION/ DROPS AURICULAR (OTIC) EVERY 8 HOURS SCHEDULED
Qty: 10 ML | Refills: 0 | Status: SHIPPED | OUTPATIENT
Start: 2020-11-05 | End: 2021-11-30

## 2020-11-05 RX ORDER — FLUTICASONE PROPIONATE 50 MCG
1 SPRAY, SUSPENSION (ML) NASAL DAILY
Qty: 1 BOTTLE | Refills: 0 | Status: SHIPPED | OUTPATIENT
Start: 2020-11-05

## 2020-11-30 ENCOUNTER — OFFICE VISIT (OUTPATIENT)
Dept: INTERNAL MEDICINE CLINIC | Facility: CLINIC | Age: 70
End: 2020-11-30
Payer: MEDICARE

## 2020-11-30 VITALS
HEIGHT: 67 IN | SYSTOLIC BLOOD PRESSURE: 128 MMHG | OXYGEN SATURATION: 99 % | HEART RATE: 73 BPM | BODY MASS INDEX: 26.06 KG/M2 | TEMPERATURE: 97.8 F | WEIGHT: 166 LBS | DIASTOLIC BLOOD PRESSURE: 90 MMHG

## 2020-11-30 DIAGNOSIS — E78.2 MODERATE MIXED HYPERLIPIDEMIA NOT REQUIRING STATIN THERAPY: ICD-10-CM

## 2020-11-30 DIAGNOSIS — Z00.00 MEDICARE ANNUAL WELLNESS VISIT, SUBSEQUENT: ICD-10-CM

## 2020-11-30 DIAGNOSIS — R19.7 DIARRHEA OF PRESUMED INFECTIOUS ORIGIN: ICD-10-CM

## 2020-11-30 DIAGNOSIS — G89.29 CHRONIC RIGHT SHOULDER PAIN: ICD-10-CM

## 2020-11-30 DIAGNOSIS — M25.511 CHRONIC RIGHT SHOULDER PAIN: ICD-10-CM

## 2020-11-30 DIAGNOSIS — R53.83 FATIGUE, UNSPECIFIED TYPE: ICD-10-CM

## 2020-11-30 DIAGNOSIS — R10.32 LEFT LOWER QUADRANT ABDOMINAL PAIN: Primary | ICD-10-CM

## 2020-11-30 DIAGNOSIS — E55.9 VITAMIN D DEFICIENCY: ICD-10-CM

## 2020-11-30 PROCEDURE — 99214 OFFICE O/P EST MOD 30 MIN: CPT | Performed by: INTERNAL MEDICINE

## 2020-11-30 PROCEDURE — G0439 PPPS, SUBSEQ VISIT: HCPCS | Performed by: INTERNAL MEDICINE

## 2020-11-30 RX ORDER — CIPROFLOXACIN 500 MG/1
500 TABLET, FILM COATED ORAL EVERY 12 HOURS SCHEDULED
Qty: 20 TABLET | Refills: 0 | Status: SHIPPED | OUTPATIENT
Start: 2020-11-30 | End: 2020-12-10

## 2020-11-30 RX ORDER — METRONIDAZOLE 500 MG/1
500 TABLET ORAL EVERY 12 HOURS SCHEDULED
Qty: 20 TABLET | Refills: 0 | Status: SHIPPED | OUTPATIENT
Start: 2020-11-30 | End: 2020-12-10

## 2020-12-02 ENCOUNTER — TELEPHONE (OUTPATIENT)
Dept: INTERNAL MEDICINE CLINIC | Facility: CLINIC | Age: 70
End: 2020-12-02

## 2020-12-16 ENCOUNTER — EVALUATION (OUTPATIENT)
Dept: PHYSICAL THERAPY | Facility: REHABILITATION | Age: 70
End: 2020-12-16
Payer: MEDICARE

## 2020-12-16 DIAGNOSIS — G89.29 CHRONIC RIGHT SHOULDER PAIN: Primary | ICD-10-CM

## 2020-12-16 DIAGNOSIS — M25.511 CHRONIC RIGHT SHOULDER PAIN: Primary | ICD-10-CM

## 2020-12-16 PROCEDURE — 97162 PT EVAL MOD COMPLEX 30 MIN: CPT | Performed by: PHYSICAL THERAPIST

## 2020-12-16 PROCEDURE — 97110 THERAPEUTIC EXERCISES: CPT | Performed by: PHYSICAL THERAPIST

## 2020-12-16 PROCEDURE — 97140 MANUAL THERAPY 1/> REGIONS: CPT | Performed by: PHYSICAL THERAPIST

## 2020-12-21 ENCOUNTER — OFFICE VISIT (OUTPATIENT)
Dept: PHYSICAL THERAPY | Facility: REHABILITATION | Age: 70
End: 2020-12-21
Payer: MEDICARE

## 2020-12-21 DIAGNOSIS — G89.29 CHRONIC RIGHT SHOULDER PAIN: Primary | ICD-10-CM

## 2020-12-21 DIAGNOSIS — M25.511 CHRONIC RIGHT SHOULDER PAIN: Primary | ICD-10-CM

## 2020-12-21 PROCEDURE — 97110 THERAPEUTIC EXERCISES: CPT | Performed by: PHYSICAL THERAPIST

## 2020-12-21 PROCEDURE — 97112 NEUROMUSCULAR REEDUCATION: CPT | Performed by: PHYSICAL THERAPIST

## 2020-12-23 ENCOUNTER — OFFICE VISIT (OUTPATIENT)
Dept: PHYSICAL THERAPY | Facility: REHABILITATION | Age: 70
End: 2020-12-23
Payer: MEDICARE

## 2020-12-23 DIAGNOSIS — G89.29 CHRONIC RIGHT SHOULDER PAIN: Primary | ICD-10-CM

## 2020-12-23 DIAGNOSIS — M25.511 CHRONIC RIGHT SHOULDER PAIN: Primary | ICD-10-CM

## 2020-12-23 PROCEDURE — 97112 NEUROMUSCULAR REEDUCATION: CPT | Performed by: PHYSICAL THERAPIST

## 2020-12-23 PROCEDURE — 97140 MANUAL THERAPY 1/> REGIONS: CPT | Performed by: PHYSICAL THERAPIST

## 2020-12-23 PROCEDURE — 97110 THERAPEUTIC EXERCISES: CPT | Performed by: PHYSICAL THERAPIST

## 2020-12-24 ENCOUNTER — TELEPHONE (OUTPATIENT)
Dept: INTERNAL MEDICINE CLINIC | Facility: CLINIC | Age: 70
End: 2020-12-24

## 2020-12-28 ENCOUNTER — OFFICE VISIT (OUTPATIENT)
Dept: PHYSICAL THERAPY | Facility: REHABILITATION | Age: 70
End: 2020-12-28
Payer: MEDICARE

## 2020-12-28 DIAGNOSIS — M25.511 CHRONIC RIGHT SHOULDER PAIN: Primary | ICD-10-CM

## 2020-12-28 DIAGNOSIS — G89.29 CHRONIC RIGHT SHOULDER PAIN: Primary | ICD-10-CM

## 2020-12-28 PROCEDURE — 97140 MANUAL THERAPY 1/> REGIONS: CPT | Performed by: PHYSICAL THERAPIST

## 2020-12-28 PROCEDURE — 97110 THERAPEUTIC EXERCISES: CPT | Performed by: PHYSICAL THERAPIST

## 2020-12-29 ENCOUNTER — APPOINTMENT (OUTPATIENT)
Dept: LAB | Facility: CLINIC | Age: 70
End: 2020-12-29
Payer: MEDICARE

## 2020-12-29 DIAGNOSIS — R19.7 DIARRHEA OF PRESUMED INFECTIOUS ORIGIN: ICD-10-CM

## 2020-12-29 PROCEDURE — 87505 NFCT AGENT DETECTION GI: CPT

## 2020-12-29 PROCEDURE — 87177 OVA AND PARASITES SMEARS: CPT

## 2020-12-29 PROCEDURE — 87209 SMEAR COMPLEX STAIN: CPT

## 2020-12-30 ENCOUNTER — TELEPHONE (OUTPATIENT)
Dept: INTERNAL MEDICINE CLINIC | Facility: CLINIC | Age: 70
End: 2020-12-30

## 2020-12-30 ENCOUNTER — OFFICE VISIT (OUTPATIENT)
Dept: PHYSICAL THERAPY | Facility: REHABILITATION | Age: 70
End: 2020-12-30
Payer: MEDICARE

## 2020-12-30 ENCOUNTER — TELEMEDICINE (OUTPATIENT)
Dept: INTERNAL MEDICINE CLINIC | Facility: CLINIC | Age: 70
End: 2020-12-30
Payer: MEDICARE

## 2020-12-30 DIAGNOSIS — M25.511 CHRONIC RIGHT SHOULDER PAIN: Primary | ICD-10-CM

## 2020-12-30 DIAGNOSIS — G89.29 CHRONIC RIGHT SHOULDER PAIN: Primary | ICD-10-CM

## 2020-12-30 DIAGNOSIS — A04.72 C. DIFFICILE DIARRHEA: Primary | ICD-10-CM

## 2020-12-30 LAB
C DIFF TOX A+B STL QL IA: POSITIVE
C DIFF TOX B TCDB STL QL NAA+PROBE: POSITIVE
CAMPYLOBACTER DNA SPEC NAA+PROBE: NORMAL
SALMONELLA DNA SPEC QL NAA+PROBE: NORMAL
SHIGA TOXIN STX GENE SPEC NAA+PROBE: NORMAL
SHIGELLA DNA SPEC QL NAA+PROBE: NORMAL

## 2020-12-30 PROCEDURE — 97110 THERAPEUTIC EXERCISES: CPT | Performed by: PHYSICAL THERAPIST

## 2020-12-30 PROCEDURE — 99441 PR PHYS/QHP TELEPHONE EVALUATION 5-10 MIN: CPT | Performed by: INTERNAL MEDICINE

## 2020-12-30 PROCEDURE — 97140 MANUAL THERAPY 1/> REGIONS: CPT | Performed by: PHYSICAL THERAPIST

## 2020-12-30 RX ORDER — VANCOMYCIN HYDROCHLORIDE 125 MG/1
125 CAPSULE ORAL 4 TIMES DAILY
Qty: 40 CAPSULE | Refills: 0 | Status: SHIPPED | OUTPATIENT
Start: 2020-12-30 | End: 2021-01-09

## 2021-01-01 LAB
G LAMBLIA AG STL QL IA: NEGATIVE
O+P STL CONC: NORMAL

## 2021-01-04 ENCOUNTER — TELEPHONE (OUTPATIENT)
Dept: INTERNAL MEDICINE CLINIC | Facility: CLINIC | Age: 71
End: 2021-01-04

## 2021-01-04 ENCOUNTER — APPOINTMENT (OUTPATIENT)
Dept: PHYSICAL THERAPY | Facility: REHABILITATION | Age: 71
End: 2021-01-04
Payer: MEDICARE

## 2021-01-04 NOTE — TELEPHONE ENCOUNTER
PT CALLED, SAID THAT YOU TOLD HER THAT SHE HAS C DIFF   SHE'D LIKE TO KNOW IF SHE CAN STILL GO TO PHYSICAL THERAPY AND ALL HER OTHER APPTS THIS WEEK OR IF SHE'S CONTAGIOUS AND NEEDS TO STAY AWAY FROM PEOPLE     PLEASE ADVISE

## 2021-01-04 NOTE — TELEPHONE ENCOUNTER
Please let her know she is contagious and should avoid those activities until her diarrhea is resolved

## 2021-01-06 ENCOUNTER — APPOINTMENT (OUTPATIENT)
Dept: PHYSICAL THERAPY | Facility: REHABILITATION | Age: 71
End: 2021-01-06
Payer: MEDICARE

## 2021-01-11 ENCOUNTER — APPOINTMENT (OUTPATIENT)
Dept: PHYSICAL THERAPY | Facility: REHABILITATION | Age: 71
End: 2021-01-11
Payer: MEDICARE

## 2021-01-13 ENCOUNTER — APPOINTMENT (OUTPATIENT)
Dept: PHYSICAL THERAPY | Facility: REHABILITATION | Age: 71
End: 2021-01-13
Payer: MEDICARE

## 2021-01-15 ENCOUNTER — TRANSCRIBE ORDERS (OUTPATIENT)
Dept: RADIOLOGY | Facility: HOSPITAL | Age: 71
End: 2021-01-15

## 2021-01-15 PROBLEM — J38.01 PARESIS OF LEFT VOCAL FOLD: Status: ACTIVE | Noted: 2021-01-15

## 2021-01-15 PROBLEM — J38.3 VOCAL CORD ATROPHY: Status: ACTIVE | Noted: 2021-01-15

## 2021-01-15 PROBLEM — R49.0 MUSCLE TENSION DYSPHONIA: Status: ACTIVE | Noted: 2021-01-15

## 2021-01-15 PROBLEM — R13.14 PHARYNGOESOPHAGEAL DYSPHAGIA: Status: ACTIVE | Noted: 2021-01-15

## 2021-01-15 PROBLEM — K90.41 GLUTEN INTOLERANCE: Status: ACTIVE | Noted: 2021-01-15

## 2021-01-15 PROBLEM — J31.0 MIXED RHINITIS: Status: ACTIVE | Noted: 2021-01-15

## 2021-01-15 PROBLEM — Z91.89 RISK OF EXPOSURE TO LYME DISEASE: Status: ACTIVE | Noted: 2021-01-15

## 2021-01-15 PROBLEM — K11.7 XEROSTOMIA: Status: ACTIVE | Noted: 2021-01-15

## 2021-01-15 PROBLEM — R49.0 DYSPHONIA: Status: ACTIVE | Noted: 2021-01-15

## 2021-01-18 ENCOUNTER — APPOINTMENT (OUTPATIENT)
Dept: PHYSICAL THERAPY | Facility: REHABILITATION | Age: 71
End: 2021-01-18
Payer: MEDICARE

## 2021-01-19 ENCOUNTER — HOSPITAL ENCOUNTER (OUTPATIENT)
Dept: RADIOLOGY | Facility: HOSPITAL | Age: 71
Discharge: HOME | End: 2021-01-19
Payer: MEDICARE

## 2021-01-19 DIAGNOSIS — R10.32 LEFT LOWER QUADRANT ABDOMINAL PAIN: ICD-10-CM

## 2021-01-19 PROCEDURE — G1004 CDSM NDSC: HCPCS

## 2021-01-19 PROCEDURE — 74177 CT ABD & PELVIS W/CONTRAST: CPT

## 2021-01-19 RX ADMIN — IOHEXOL 100 ML: 350 INJECTION, SOLUTION INTRAVENOUS at 13:16

## 2021-01-20 ENCOUNTER — OFFICE VISIT (OUTPATIENT)
Dept: PHYSICAL THERAPY | Facility: REHABILITATION | Age: 71
End: 2021-01-20
Payer: MEDICARE

## 2021-01-20 DIAGNOSIS — M25.511 CHRONIC RIGHT SHOULDER PAIN: Primary | ICD-10-CM

## 2021-01-20 DIAGNOSIS — G89.29 CHRONIC RIGHT SHOULDER PAIN: Primary | ICD-10-CM

## 2021-01-20 PROCEDURE — 97140 MANUAL THERAPY 1/> REGIONS: CPT | Performed by: PHYSICAL THERAPIST

## 2021-01-20 PROCEDURE — 97110 THERAPEUTIC EXERCISES: CPT | Performed by: PHYSICAL THERAPIST

## 2021-01-20 NOTE — PROGRESS NOTES
Daily Note     Today's date: 2021  Patient name: Fani Pang  : 1950  MRN: [id number]  Referring provider: Steffanie West DO  Dx:   Encounter Diagnosis     ICD-10-CM    1  Chronic right shoulder pain  M25 511     G89 29                   Subjective: Returns from 3 week break from PT due to other medical conditions, states minimal pain in shoulder but she has been resting it  Objective: See treatment diary below    Assessment: Completed exercise with correct technique and without reports of pain  Demonstrated moderate fatigue after exercise  Pt would benefit from continued PT services to reduce pain and increase level of function  Plan: Continue per plan of care        Precautions: Universal      Manuals        R shoulder PROM MM MM MM  MM MM MM       R shoulder mobs Gr II/III MM Gr III MM Gr III MM Gr III MM Gr III MM Gr III MM                                 Neuro Re-Ed             TB ER  OTB 3x10 PTB 3x10          TB IR  OTB 3x10 PTB 3x10          Scaption  2x10  3x8 2#          LPD      OTB 30x3"       TB row      OTB 30x3"                                 Ther Ex             Supine cane flexion HEP Reviewed           Supine cane ER HEP Reviewed           Cervical rotation snags HEP Reviewed           Wall slide flexion   15x2"                                    UBE     3'/3'        Pulleys    5' 5'        Ther Activity                                       Gait Training                                       Modalities

## 2021-01-25 ENCOUNTER — OFFICE VISIT (OUTPATIENT)
Dept: PHYSICAL THERAPY | Facility: REHABILITATION | Age: 71
End: 2021-01-25
Payer: MEDICARE

## 2021-01-25 DIAGNOSIS — G89.29 CHRONIC RIGHT SHOULDER PAIN: Primary | ICD-10-CM

## 2021-01-25 DIAGNOSIS — M25.511 CHRONIC RIGHT SHOULDER PAIN: Primary | ICD-10-CM

## 2021-01-25 PROCEDURE — 97140 MANUAL THERAPY 1/> REGIONS: CPT | Performed by: PHYSICAL THERAPIST

## 2021-01-25 PROCEDURE — 97110 THERAPEUTIC EXERCISES: CPT | Performed by: PHYSICAL THERAPIST

## 2021-01-25 NOTE — PROGRESS NOTES
Daily Note     Today's date: 2021  Patient name: Fani Pang  : 1950  MRN: [id number]  Referring provider: Steffanie West DO  Dx:   Encounter Diagnosis     ICD-10-CM    1  Chronic right shoulder pain  M25 511     G89 29                   Subjective: States that she fell last week and caught herself with her right shoulder and it is sore now  Objective: See treatment diary below    Assessment: Completed exercise with correct technique and without reports of pain  Demonstrated moderate fatigue after exercise  Pt would benefit from continued PT services to reduce pain and increase level of function  Plan: Continue per plan of care        Precautions: Universal      Manuals        R shoulder PROM MM MM MM  MM MM MM       R shoulder mobs Gr II/III MM Gr III MM Gr III MM Gr III MM Gr III MM Gr III MM                                 Neuro Re-Ed             TB ER  OTB 3x10 PTB 3x10          TB IR  OTB 3x10 PTB 3x10          Scaption  2x10  3x8 2#          LPD      OTB 30x3"       TB row      OTB 30x3"                                 Ther Ex             Supine cane flexion  Reviewed           Supine cane ER  Reviewed           Cervical rotation snags  Reviewed           Wall slide flexion   15x2"          Shoulder Isometrics ER/abd/IR 5x10" ea                         UBE     3'/3'        Pulleys 5'   5' 5'        Ther Activity                                       Gait Training                                       Modalities

## 2021-01-26 ENCOUNTER — TELEPHONE (OUTPATIENT)
Dept: INTERNAL MEDICINE CLINIC | Facility: CLINIC | Age: 71
End: 2021-01-26

## 2021-01-26 NOTE — TELEPHONE ENCOUNTER
Patient called wanting to know does c-diff  really go away     Or is it something that can come back every so often    Please advise

## 2021-01-26 NOTE — TELEPHONE ENCOUNTER
Please let patient know that C diff is a bacteria that is always in our stomach  It is kept in check with other good bacteria in the gut  When we take antibiotics it clears out the good bacteria and the c  Diff can take over  C  Diff diarrhea does go away however after the course of vancomycin

## 2021-01-27 ENCOUNTER — OFFICE VISIT (OUTPATIENT)
Dept: PHYSICAL THERAPY | Facility: REHABILITATION | Age: 71
End: 2021-01-27
Payer: MEDICARE

## 2021-01-27 DIAGNOSIS — M25.511 CHRONIC RIGHT SHOULDER PAIN: Primary | ICD-10-CM

## 2021-01-27 DIAGNOSIS — G89.29 CHRONIC RIGHT SHOULDER PAIN: Primary | ICD-10-CM

## 2021-01-27 PROCEDURE — 97110 THERAPEUTIC EXERCISES: CPT | Performed by: PHYSICAL THERAPIST

## 2021-01-27 PROCEDURE — 97140 MANUAL THERAPY 1/> REGIONS: CPT | Performed by: PHYSICAL THERAPIST

## 2021-01-27 NOTE — PROGRESS NOTES
Daily Note     Today's date: 2021  Patient name: Kimber Osborne  : 1950  MRN: [id number]  Referring provider: Brayan Gomes DO  Dx:   Encounter Diagnosis     ICD-10-CM    1  Chronic right shoulder pain  M25 511     G89 29                   Subjective: D/C this session due to I w/ HEP  Current pain: 2    Objective: See treatment diary below    Assessment: d/c this session  STG: R shoulder ROM improved by 25% in all deficient planes in 2-4 weeks  - MET  STG: Subjectively reports pain decreased by 2 points in 2-4 weeks  - MET  STG: I w/ HEPs 1 week after prescription  - MET  LTG: [place] w/ <3/10 R shoulder pain by d/c  - Partially met  LTG: FOTO >= to goal by d/c  - MET  LTG: I w/ comprehensive HEP by d/c  - MET      Plan: D/c this session        Precautions: Universal      Manuals        R shoulder PROM MM MM MM  MM MM MM       R shoulder mobs Gr II/III MM Gr II/III MM Gr III MM Gr III MM Gr III MM Gr III MM                                 Neuro Re-Ed             TB ER   PTB 3x10          TB IR   PTB 3x10          Scaption   3x8 2#          LPD      OTB 30x3"       TB row      OTB 30x3"                                 Ther Ex             Supine cane flexion             Supine cane ER             Cervical rotation snags             Wall slide flexion   15x2"          Shoulder Isometrics ER/abd/IR 5x10" ea            HEP Education  25' MM           UBE     3'/3'        Pulleys 5'   5' 5'        Ther Activity                                       Gait Training                                       Modalities

## 2021-03-01 ENCOUNTER — OFFICE VISIT (OUTPATIENT)
Dept: PODIATRY | Facility: CLINIC | Age: 71
End: 2021-03-01
Payer: MEDICARE

## 2021-03-01 VITALS
BODY MASS INDEX: 26.15 KG/M2 | DIASTOLIC BLOOD PRESSURE: 84 MMHG | SYSTOLIC BLOOD PRESSURE: 130 MMHG | HEIGHT: 67 IN | WEIGHT: 166.6 LBS

## 2021-03-01 DIAGNOSIS — Q80.9 ICHTHYOSIS: Primary | ICD-10-CM

## 2021-03-01 DIAGNOSIS — L60.3 NAIL DYSTROPHY: ICD-10-CM

## 2021-03-01 PROCEDURE — 99202 OFFICE O/P NEW SF 15 MIN: CPT | Performed by: PODIATRIST

## 2021-03-01 RX ORDER — MULTIVITAMIN
1 CAPSULE ORAL DAILY
COMMUNITY

## 2021-03-01 NOTE — PROGRESS NOTES
Assessment/Plan:      Explained to patient that she has adequate circulation in both feet  There is mild erythema in her feet but there is no sign of infection or significant vascular  disturbance  There is no evidence or symptoms of frost bite  Feet are dry and scaly due to ichthyosis  Patient advised to utilize a moisturizer on a b i d  basis  Toenails are dystrophic and may have an element of fungus but patient is not interested in oral Lamisil  Patient to contact me should symptoms change  No problem-specific Assessment & Plan notes found for this encounter  Diagnoses and all orders for this visit:    Ichthyosis    Nail dystrophy    Other orders  -     Lactobacillus-Inulin (PROBIOTIC DIGESTIVE SUPPORT PO); Take by mouth  -     Multiple Vitamin (multivitamin) capsule; Take 1 capsule by mouth daily          Subjective:      Patient ID: Brennan Richards is a 72 y o  female  HPI      Patient presents for pedal assessment  Patient relates multiple concerns regarding her feet  She notes that she has been diagnosed with ichthyosis and has dry skin throughout her body  This has caused dryness and scaling on the soles of her feet and toes as well as fissure prone lesions  She has no pain from fissures at this time however  Patient also notes that her toenails are thickened and that her feet are frequently red  Patient shoveled snow for approximately 2 hours a few weeks back and noted that her feet were discolored after shoveling  She denies burning or tingling in her feet but is concerned that she may have had frost bite  It is noted that her hands are also discolored red  The following portions of the patient's history were reviewed and updated as appropriate: allergies, current medications, past family history, past medical history, past social history, past surgical history and problem list     Review of Systems   Respiratory: Negative  Cardiovascular: Negative  Gastrointestinal: Negative  Musculoskeletal: Positive for back pain  Skin:        Xerosis and scaling             Objective:      /84 (BP Location: Left arm)   Ht 5' 7" (1 702 m)   Wt 75 6 kg (166 lb 9 6 oz)   BMI 26 09 kg/m²          Physical Exam  Constitutional:       Appearance: Normal appearance  Cardiovascular:      Pulses: Normal pulses  Musculoskeletal:         General: Deformity present  No tenderness  Comments: Asymptomatic hallux valgus deformity left foot   Skin:     General: Skin is dry  Comments: Skin on the soles of both feet and toes is dry, scaly and fissure prone   Neurological:      General: No focal deficit present  Mental Status: She is oriented to person, place, and time

## 2021-03-09 ENCOUNTER — TELEPHONE (OUTPATIENT)
Dept: INTERNAL MEDICINE CLINIC | Facility: CLINIC | Age: 71
End: 2021-03-09

## 2021-03-09 DIAGNOSIS — R92.8 ABNORMAL MAMMOGRAM OF LEFT BREAST: Primary | ICD-10-CM

## 2021-03-09 NOTE — TELEPHONE ENCOUNTER
CALLING FROM [facility], THEY FOUND PT'S LEFT BREAST TO BE DENSE AND THEY NEED A NEW DIAGNOSTIC SCRIPT FOR     LEFT BREAST DIAGNOSTIC MAMMO WITH CAD DX SYMERTY N64 80    FAX ORDER TO [facility] [phone]    CAN ALSO WRITE IF NEEDED, DO DIAGNOSTIC LEFT BREAST US

## 2021-03-25 DIAGNOSIS — R92.8 ABNORMAL MAMMOGRAM OF LEFT BREAST: ICD-10-CM

## 2021-04-04 PROBLEM — J34.89 DRY NOSE: Status: ACTIVE | Noted: 2021-04-04

## 2021-04-04 PROBLEM — R76.8 ANA POSITIVE: Status: ACTIVE | Noted: 2021-04-04

## 2021-08-24 ENCOUNTER — OFFICE VISIT (OUTPATIENT)
Dept: INTERNAL MEDICINE CLINIC | Facility: CLINIC | Age: 71
End: 2021-08-24
Payer: MEDICARE

## 2021-08-24 VITALS — WEIGHT: 166 LBS | OXYGEN SATURATION: 99 % | HEART RATE: 88 BPM | HEIGHT: 67 IN | BODY MASS INDEX: 26.06 KG/M2

## 2021-08-24 DIAGNOSIS — H92.09 EAR ACHE: ICD-10-CM

## 2021-08-24 DIAGNOSIS — J02.9 SORETHROAT: Primary | ICD-10-CM

## 2021-08-24 LAB — S PYO AG THROAT QL: NEGATIVE

## 2021-08-24 PROCEDURE — 87880 STREP A ASSAY W/OPTIC: CPT | Performed by: INTERNAL MEDICINE

## 2021-08-24 PROCEDURE — 99213 OFFICE O/P EST LOW 20 MIN: CPT | Performed by: INTERNAL MEDICINE

## 2021-08-24 RX ORDER — AZITHROMYCIN 250 MG/1
TABLET, FILM COATED ORAL
Qty: 6 TABLET | Refills: 0 | Status: SHIPPED | OUTPATIENT
Start: 2021-08-24 | End: 2021-08-29

## 2021-08-24 NOTE — PROGRESS NOTES
Assessment/Plan:      #Sore Throat  -present several weeks after URI  -has right ear ache with radiation down to right neck  -has mild soreness in neck area  -rapid strep negative  -will start on zpak, patient defers augmentin due to history of c  Diff for now    No problem-specific Assessment & Plan notes found for this encounter  Diagnoses and all orders for this visit:    Sorethroat  -     POCT rapid strepA  -     azithromycin (Zithromax) 250 mg tablet; Take 2 tablets (500 mg total) by mouth daily for 1 day, THEN 1 tablet (250 mg total) daily for 4 days  Ear ache  -     azithromycin (Zithromax) 250 mg tablet; Take 2 tablets (500 mg total) by mouth daily for 1 day, THEN 1 tablet (250 mg total) daily for 4 days  Current Outpatient Medications:     acyclovir (ZOVIRAX) 400 MG tablet, Take 1 tablet (400 mg total) by mouth 3 (three) times a day, Disp: 90 tablet, Rfl: 1    azelastine (ASTELIN) 0 1 % nasal spray, 1 spray into each nostril 2 (two) times a day (Patient taking differently: 1 spray into each nostril 2 (two) times a day as needed ), Disp: 1 Bottle, Rfl: 6    azithromycin (Zithromax) 250 mg tablet, Take 2 tablets (500 mg total) by mouth daily for 1 day, THEN 1 tablet (250 mg total) daily for 4 days  , Disp: 6 tablet, Rfl: 0    Cholecalciferol 2000 units CAPS, Take 2,000 Units by mouth, Disp: , Rfl:     fluticasone (FLONASE) 50 mcg/act nasal spray, 1 spray into each nostril daily, Disp: 1 Bottle, Rfl: 0    Lactobacillus-Inulin (PROBIOTIC DIGESTIVE SUPPORT PO), Take by mouth, Disp: , Rfl:     Multiple Vitamin (multivitamin) capsule, Take 1 capsule by mouth daily, Disp: , Rfl:     neomycin-polymyxin-hydrocortisone (CORTISPORIN) 0 35%-10,000 units/mL-1% otic suspension, Administer 4 drops into both ears every 8 (eight) hours, Disp: 10 mL, Rfl: 0    Subjective:      Patient ID: Jamir Currie is a 80 y o  female  HPI    Patient presents for an acute visit    Reports that she has been having issues with swallowing and has right ear ache as well as right neck pain that is 4/10  She states that her granddaughter was sick back in July and she started to come down with symptoms after that point  She had an upper respiratory infection with postnasal drip and a dry cough however though symptoms have gone away  Rapid strep today was negative  Patient has taken Tylenol without any improvement since Friday  We will start her on a Z-Hari at this time  The following portions of the patient's history were reviewed and updated as appropriate: allergies, current medications, past family history, past medical history, past social history, past surgical history and problem list     Review of Systems   Constitutional: Negative for activity change, appetite change, chills, diaphoresis, fatigue and fever  HENT: Positive for ear pain (right ear), sore throat, trouble swallowing and voice change  Negative for congestion  Respiratory: Negative for cough and shortness of breath  Cardiovascular: Negative for chest pain and palpitations  Gastrointestinal: Negative for abdominal pain, constipation, diarrhea, nausea and vomiting  Musculoskeletal: Negative for back pain and myalgias  Neurological: Negative for dizziness and headaches  Objective:      Pulse 88   Ht 5' 7" (1 702 m)   Wt 75 3 kg (166 lb)   SpO2 99%   BMI 26 00 kg/m²          Physical Exam  Constitutional:       General: She is not in acute distress  Appearance: She is well-developed  She is not diaphoretic  HENT:      Head: Normocephalic and atraumatic  Right Ear: Tympanic membrane, ear canal and external ear normal  There is no impacted cerumen  Left Ear: Tympanic membrane, ear canal and external ear normal  There is no impacted cerumen  Nose: Nose normal  No congestion or rhinorrhea  Mouth/Throat:      Mouth: Mucous membranes are moist       Pharynx: Posterior oropharyngeal erythema present  No oropharyngeal exudate  Eyes:      Conjunctiva/sclera: Conjunctivae normal       Pupils: Pupils are equal, round, and reactive to light  Neck:      Vascular: No JVD  Trachea: No tracheal deviation  Cardiovascular:      Rate and Rhythm: Normal rate and regular rhythm  Pulses: Normal pulses  Heart sounds: Normal heart sounds  No murmur heard  Pulmonary:      Effort: Pulmonary effort is normal  No respiratory distress  Breath sounds: Normal breath sounds  No stridor  No wheezing, rhonchi or rales  Musculoskeletal:         General: No tenderness or deformity  Normal range of motion  Cervical back: Normal range of motion and neck supple  No rigidity or tenderness  Lymphadenopathy:      Cervical: No cervical adenopathy  Skin:     General: Skin is warm and dry  Findings: No erythema  Neurological:      Mental Status: She is alert and oriented to person, place, and time  This note was completed in part utilizing [employer] direct voice recognition software  Grammatical errors, random word insertion, spelling mistakes, and incomplete sentences may be an occasional consequence of the system secondary to software limitations, ambient noise and hardware issues  At the time of dictation, efforts were made to edit, clarify and /or correct errors  Please read the chart carefully and recognize, using context, where substitutions have occurred  If you have any questions or concerns about the context, text or information contained within the body of this dictation, please contact myself, the provider, for further clarification

## 2021-09-20 ENCOUNTER — TELEPHONE (OUTPATIENT)
Dept: INTERNAL MEDICINE CLINIC | Facility: CLINIC | Age: 71
End: 2021-09-20

## 2021-11-30 ENCOUNTER — OFFICE VISIT (OUTPATIENT)
Dept: INTERNAL MEDICINE CLINIC | Facility: CLINIC | Age: 71
End: 2021-11-30
Payer: MEDICARE

## 2021-11-30 VITALS
WEIGHT: 170 LBS | HEART RATE: 76 BPM | SYSTOLIC BLOOD PRESSURE: 128 MMHG | DIASTOLIC BLOOD PRESSURE: 78 MMHG | BODY MASS INDEX: 26.68 KG/M2 | HEIGHT: 67 IN

## 2021-11-30 DIAGNOSIS — R10.13 EPIGASTRIC PAIN: ICD-10-CM

## 2021-11-30 DIAGNOSIS — R63.5 WEIGHT GAIN: ICD-10-CM

## 2021-11-30 DIAGNOSIS — R10.32 LEFT LOWER QUADRANT ABDOMINAL PAIN: ICD-10-CM

## 2021-11-30 DIAGNOSIS — Z13.820 SCREENING FOR OSTEOPOROSIS: ICD-10-CM

## 2021-11-30 DIAGNOSIS — Z78.0 ASYMPTOMATIC MENOPAUSAL STATE: ICD-10-CM

## 2021-11-30 DIAGNOSIS — R29.3 ABNORMAL POSTURE: ICD-10-CM

## 2021-11-30 DIAGNOSIS — E55.9 VITAMIN D DEFICIENCY: ICD-10-CM

## 2021-11-30 DIAGNOSIS — Z00.00 MEDICARE ANNUAL WELLNESS VISIT, SUBSEQUENT: ICD-10-CM

## 2021-11-30 DIAGNOSIS — E78.2 MODERATE MIXED HYPERLIPIDEMIA NOT REQUIRING STATIN THERAPY: Primary | ICD-10-CM

## 2021-11-30 PROCEDURE — G0438 PPPS, INITIAL VISIT: HCPCS | Performed by: INTERNAL MEDICINE

## 2021-11-30 PROCEDURE — 1123F ACP DISCUSS/DSCN MKR DOCD: CPT | Performed by: INTERNAL MEDICINE

## 2021-11-30 PROCEDURE — 99215 OFFICE O/P EST HI 40 MIN: CPT | Performed by: INTERNAL MEDICINE

## 2021-12-08 ENCOUNTER — CLINICAL SUPPORT (OUTPATIENT)
Dept: INTERNAL MEDICINE CLINIC | Facility: CLINIC | Age: 71
End: 2021-12-08
Payer: MEDICARE

## 2021-12-08 DIAGNOSIS — Z23 FLU VACCINE NEED: Primary | ICD-10-CM

## 2021-12-08 PROCEDURE — G0008 ADMIN INFLUENZA VIRUS VAC: HCPCS

## 2021-12-08 PROCEDURE — 90662 IIV NO PRSV INCREASED AG IM: CPT

## 2022-03-01 ENCOUNTER — TELEPHONE (OUTPATIENT)
Dept: INTERNAL MEDICINE CLINIC | Facility: CLINIC | Age: 72
End: 2022-03-01

## 2022-03-01 NOTE — TELEPHONE ENCOUNTER
PT CALLED, SAID THAT SHE WAS TOLD THAT SHE NEEDS AN ORDER FOR A DIAGNOSITC LEFT BREAST MAMMO AND ULTRASOUND DUE TO A CYST THAT WAS FOUND ON HER LAST MAMMO     I WILL MAIL HOME THE SLIP TO PT ONCE DONE SINCE SHE GOES TO [facility] FOR HER TESTING

## 2022-03-07 ENCOUNTER — CLINICAL SUPPORT (OUTPATIENT)
Dept: NUTRITION | Facility: HOSPITAL | Age: 72
End: 2022-03-07

## 2022-03-07 VITALS — BODY MASS INDEX: 27.03 KG/M2 | WEIGHT: 168.2 LBS | HEIGHT: 66 IN

## 2022-03-07 DIAGNOSIS — R63.5 WEIGHT GAIN: ICD-10-CM

## 2022-03-07 NOTE — PROGRESS NOTES
Initial Nutrition Assessment Form    Patient Name: Lonny Curiel    YOB: 1950    Sex: Female     Assessment Date: 3/7/2022  Start Time: 10am Stop Time: 1050am Total Minutes:50 minutes      Data:  Present at session: self   Parent Concerns: For my height I shouldn't weigh this much    Medical Dx/Reason for Referral: Weight gain (R63 5)   Past Medical History:   Diagnosis Date    Diverticulosis 2021    Long term use of drug     RESOLVED: 17OCT2014       Current Outpatient Medications   Medication Sig Dispense Refill    acyclovir (ZOVIRAX) 400 MG tablet Take 1 tablet (400 mg total) by mouth 3 (three) times a day 90 tablet 1    azelastine (ASTELIN) 0 1 % nasal spray 1 spray into each nostril 2 (two) times a day (Patient taking differently: 1 spray into each nostril 2 (two) times a day as needed ) 1 Bottle 6    Cholecalciferol 2000 units CAPS Take 2,000 Units by mouth      fluticasone (FLONASE) 50 mcg/act nasal spray 1 spray into each nostril daily 1 Bottle 0    Lactobacillus-Inulin (PROBIOTIC DIGESTIVE SUPPORT PO) Take by mouth      Multiple Vitamin (multivitamin) capsule Take 1 capsule by mouth daily       No current facility-administered medications for this visit  Additional Meds/Supplements: N/a    Special Learning Needs: N/a    Height: HC Readings from Last 3 Encounters:   No data found for [facility]       Weight: Wt Readings from Last 3 Encounters:   11/30/21 77 1 kg (170 lb)   08/24/21 75 3 kg (166 lb)   03/15/21 75 3 kg (166 lb)     There is no height or weight on file to calculate BMI  Recent Weight Change: [x]Yes     []No  Amount: 20# since 1515-8774- believes she gained weight over COVID  Energy Needs: 2835-2822 kcal/day   No Known Allergies    Social History     Substance and Sexual Activity   Alcohol Use Yes    Comment: SOCIAL        Social History     Tobacco Use   Smoking Status Never Smoker   Smokeless Tobacco Never Used       Who shops? patient   Who cooks? patient   Exercise: sedentary - is more active during the summer  Gardening will bother her knee  States she may need a knee replacement  Prior Counseling? 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 due to the schedule  Tried to up water intake  AM 16 oz of water with bene fiber  Will watch OJ since it bothers her stomach  Trying hard to incorporate more water  Goal is 28 oz   AM - n/a   PM -   HS -          Nutrition Diagnosis:   Overweight/obesity  related to Excess energy intake as evidenced by  BMI more than normative standard for age and sex (obesity-grade I 26-30  9)       Medical Nutrition Therapy Intervention:  [x]Individualized Meal Plan Reviewed High Fiber/Benefits of Fiber in relationship to weight loss  []Understanding Lab Values   []Basic Pathophysiology of Disease []Food/Medication Interactions   []Food Diary [x]Exercise: Recommended starting off slowly with 30 minutes of moderate/high intensity exercises  ( Discussed Swimming/Walking)   [x]Lifestyle/Behavior Modification Techniques: Reviewed Importance of not skipping meals  []Medication, Mechanism of Action   []Label Reading []Self Blood Glucose Monitoring   [x]Weight/BMI Goals: Goal 7580-5193 kcals 5# weight loss x 3 months  [x]Other - Portion control reviewed/Reviewed high fiber/low fiber diet for diverticulosis vs  Diverticulitis      Other Notes: Importance of drinking more water         Comprehension: []Excellent  []Very Good  [x]Good  []Fair   []Poor    Receptivity: []Excellent  []Very Good  [x]Good  []Fair   []Poor    Expected Compliance: []Excellent  []Very Good  [x]Good  []Fair   []Poor        Goals: 1  Patient will lose 5 # x 3 months  2 Patient will not skip meals    3  Patient will gradually incorporate high fiber foods into her diet and increase water intake to > 4 cups/day  No follow-ups on file    Labs:  CMP  No results found for: NA, K, CL, CO2, ANIONGAP, BUN, CREATININE, GLUCOSE, GLUF, CALCIUM, CORRECTEDCA, AST, ALT, ALKPHOS, PROT, BILITOT, EGFR    BMP  No results found for: GLUCOSE, CALCIUM, NA, K, CO2, CL, BUN, CREATININE    Lipids  No results found for: CHOL  No results found for: HDL  No results found for: LDLCALC  No results found for: TRIG  No results found for: CHOLHDL    Hemoglobin A1C  Lab Results   Component Value Date    HGBA1C 5 6 11/21/2019    HGBA1C 5 6 11/21/2019       Fasting Glucose  No results found for: GLUF    Insulin     Thyroid  No results found for: TSH, U3EJHLO, K9ECXHA, THYROIDAB    Hepatic Function Panel  No results found for: ALT, AST, GGT, ALKPHOS, BILITOT    Celiac Disease Antibody Panel  No results found for: ENDOMYSIAL IGA, GLIADIN IGA, GLIADIN IGG, IGA, TISSUE TRANSGLUT AB, TTG IGA   Iron  No results found for: IRON, TIBC, FERRITIN    Vitamins  No results found for: VITAMIN B2   No results found for: NICOTINAMIDE, NICOTINIC ACID   No results found for: VITAMINB6  No results found for: VUKUJWNY01  No results found for: VITB5  No results found for: W8ABIZOL  No results found for: THYROGLB  No results found for: VITAMIN K   No results found for: 25-HYDROXY VIT D   No components found for: ADENIKE [address] RD, LDN  [address] [facility]  [address] [id number]

## 2022-12-01 ENCOUNTER — OFFICE VISIT (OUTPATIENT)
Dept: INTERNAL MEDICINE CLINIC | Facility: CLINIC | Age: 72
End: 2022-12-01

## 2022-12-01 VITALS
HEART RATE: 84 BPM | HEIGHT: 66 IN | BODY MASS INDEX: 27.74 KG/M2 | WEIGHT: 172.6 LBS | DIASTOLIC BLOOD PRESSURE: 82 MMHG | OXYGEN SATURATION: 99 % | RESPIRATION RATE: 15 BRPM | SYSTOLIC BLOOD PRESSURE: 122 MMHG

## 2022-12-01 DIAGNOSIS — E78.2 MODERATE MIXED HYPERLIPIDEMIA NOT REQUIRING STATIN THERAPY: Primary | ICD-10-CM

## 2022-12-01 DIAGNOSIS — Z78.0 ASYMPTOMATIC MENOPAUSAL STATE: ICD-10-CM

## 2022-12-01 DIAGNOSIS — Z13.820 SCREENING FOR OSTEOPOROSIS: ICD-10-CM

## 2022-12-01 DIAGNOSIS — Z23 FLU VACCINE NEED: ICD-10-CM

## 2022-12-01 DIAGNOSIS — M17.12 ARTHRITIS OF LEFT KNEE: ICD-10-CM

## 2022-12-01 DIAGNOSIS — E55.9 VITAMIN D DEFICIENCY: ICD-10-CM

## 2022-12-01 DIAGNOSIS — Z12.31 ENCOUNTER FOR SCREENING MAMMOGRAM FOR MALIGNANT NEOPLASM OF BREAST: ICD-10-CM

## 2022-12-01 DIAGNOSIS — Z00.00 MEDICARE ANNUAL WELLNESS VISIT, SUBSEQUENT: ICD-10-CM

## 2022-12-01 RX ORDER — IBUPROFEN 800 MG
TABLET ORAL
COMMUNITY
Start: 2022-09-01 | End: 2022-12-01

## 2022-12-01 RX ORDER — CHOLECALCIFEROL (VITAMIN D3) 50 MCG
2000 TABLET ORAL DAILY
COMMUNITY

## 2022-12-01 NOTE — PROGRESS NOTES
Assessment/Plan:    #Diverticulosis  -noted on colonoscopy over sigmoid  -previous flare LLQ and treated with cipro and flagyl  -self treats flares with a GI bland liquid diet  -had a flare several months ago    #HLD  -   -continue diet and exercise, is trying to loose weight    #Left Knee Pain  -worsening  -is interested in seeing orthopedics [facility] and referral given    #Left Breast Mass  -repeat US stable and will return to routine screenings     #Lumbar Back Pain  -has numbness and tingling over left medial calf  -defers imaging of lumbar back and EMG for now   -is currently stable     #Sinusitis  -chronic and saw ENT In the past  -remains on symptomatic care with flonase  -ENT reports dry mouth with eyes nose, skin and elevated MINI and suggested rheumatology evaluation    #Right Shoulder Pain  -saw PT in the past  -no issues with ROM    #GERD  -reports abdominal bloating and distension  -previous EGD negative  -now resolved with dietary changes    #Osteopenia  -awaiting repeat DEXA     #Vitamin D Deficiency  -will resume 2000 units daily    #Health Maintenance  -routine labs and followup 1 year  -mammogram up to date 2021  -colonoscopy due 2031  -flu vaccine today  -covid booster bivalent defers for now  -shingrix pending    No problem-specific Assessment & Plan notes found for this encounter  Diagnoses and all orders for this visit:    Moderate mixed hyperlipidemia not requiring statin therapy  -     CBC and differential; Future  -     Comprehensive metabolic panel; Future  -     Lipid Panel with Direct LDL reflex; Future    Vitamin D deficiency  -     CBC and differential; Future  -     Comprehensive metabolic panel; Future  -     Vitamin D 25 hydroxy; Future    Screening for osteoporosis  -     CBC and differential; Future  -     Comprehensive metabolic panel; Future  -     DXA bone density spine hip and pelvis;  Future    Asymptomatic menopausal state  -     CBC and differential; Future  -     Comprehensive metabolic panel; Future  -     DXA bone density spine hip and pelvis; Future    Medicare annual wellness visit, subsequent  -     CBC and differential; Future  -     Comprehensive metabolic panel; Future    Flu vaccine need  -     influenza vaccine, high-dose, PF 0 7 mL (FLUZONE HIGH-DOSE)  -     CBC and differential; Future  -     Comprehensive metabolic panel; Future    Arthritis of left knee  -     Ambulatory Referral to Orthopedic Surgery; Future    Encounter for screening mammogram for malignant neoplasm of breast  -     Mammo screening bilateral w 3d & cad; Future    Other orders  -     Discontinue: Cholecalciferol (Vitamin D3) 10 MCG (400 UNIT) CAPS  -     Cholecalciferol (Vitamin D) 50 MCG (2000 UT) tablet; Take 2,000 Units by mouth daily            Current Outpatient Medications:   •  azelastine (ASTELIN) 0 1 % nasal spray, 1 spray into each nostril 2 (two) times a day (Patient taking differently: 1 spray into each nostril 2 (two) times a day as needed), Disp: 1 Bottle, Rfl: 6  •  Cholecalciferol (Vitamin D) 50 MCG (2000 UT) tablet, Take 2,000 Units by mouth daily, Disp: , Rfl:   •  fluticasone (FLONASE) 50 mcg/act nasal spray, 1 spray into each nostril daily, Disp: 1 Bottle, Rfl: 0  •  Lactobacillus-Inulin (PROBIOTIC DIGESTIVE SUPPORT PO), Take by mouth, Disp: , Rfl:   •  Multiple Vitamin (multivitamin) capsule, Take 1 capsule by mouth daily, Disp: , Rfl:   •  acyclovir (ZOVIRAX) 400 MG tablet, Take 1 tablet (400 mg total) by mouth 3 (three) times a day, Disp: 90 tablet, Rfl: 1    Subjective:      Patient ID: Zi Stewart is a 71 y o  female  HPI     Patient presents for routine checkup  Denies any recent hospitalizations or surgeries  Her LDL is 117 and   She will continue to diet and exercise  She will try to lose weight  Her vitamin-D is low at 27 and she will start taking 2000 units vitamin-D daily    She has been having left knee pains that has been worsening  We will refer her to orthopedics  She will receive the flu vaccine today  She is due for DEXA scan  She will receive her mammogram in April   she will return to care in 12 months  The following portions of the patient's history were reviewed and updated as appropriate: allergies, current medications, past family history, past medical history, past social history, past surgical history and problem list     Review of Systems   Constitutional: Negative for activity change, appetite change, chills, fatigue and fever  HENT: Negative for congestion, ear pain, facial swelling, hearing loss, sore throat, tinnitus and trouble swallowing  Eyes: Negative for photophobia and visual disturbance  Respiratory: Negative for cough, shortness of breath and wheezing  Cardiovascular: Negative for chest pain, palpitations and leg swelling  Gastrointestinal: Negative for abdominal distention, abdominal pain, blood in stool, constipation, diarrhea, nausea and vomiting  Genitourinary: Negative for difficulty urinating, dysuria and pelvic pain  Musculoskeletal: Positive for arthralgias (left knee)  Negative for back pain, gait problem, joint swelling, myalgias, neck pain and neck stiffness  Skin: Negative for rash and wound  Neurological: Negative for dizziness, tremors, light-headedness and headaches  Objective:      /82   Pulse 84   Resp 15   Ht 5' 6" (1 676 m)   Wt 78 3 kg (172 lb 9 6 oz)   SpO2 99%   BMI 27 86 kg/m²          Physical Exam  Vitals reviewed  Constitutional:       Appearance: Normal appearance  She is well-developed and well-nourished  HENT:      Head: Normocephalic and atraumatic  Right Ear: Tympanic membrane, ear canal and external ear normal  There is no impacted cerumen  Left Ear: Tympanic membrane, ear canal and external ear normal  There is no impacted cerumen  Nose: Nose normal  No congestion        Mouth/Throat:      Mouth: Oropharynx is clear and moist       Pharynx: Oropharynx is clear  No oropharyngeal exudate or posterior oropharyngeal erythema  Eyes:      Extraocular Movements: EOM normal       Conjunctiva/sclera: Conjunctivae normal       Pupils: Pupils are equal, round, and reactive to light  Neck:      Thyroid: No thyromegaly  Vascular: No JVD  Cardiovascular:      Rate and Rhythm: Normal rate and regular rhythm  Pulses: Normal pulses and intact distal pulses  Heart sounds: Normal heart sounds  No murmur heard  Pulmonary:      Effort: Pulmonary effort is normal  No respiratory distress  Breath sounds: Normal breath sounds  No stridor  No wheezing, rhonchi or rales  Abdominal:      General: Bowel sounds are normal  There is no distension  Palpations: Abdomen is soft  There is no mass  Tenderness: There is no abdominal tenderness  There is no right CVA tenderness, left CVA tenderness, guarding or rebound  Musculoskeletal:         General: Tenderness present  No edema  Normal range of motion  Cervical back: Normal range of motion and neck supple  Right lower leg: No edema  Left lower leg: No edema (left patella)  Lymphadenopathy:      Cervical: No cervical adenopathy  Skin:     General: Skin is warm  Findings: No erythema or rash  Neurological:      Mental Status: She is alert and oriented to person, place, and time  Deep Tendon Reflexes: Reflexes are normal and symmetric  This note was completed in part utilizing [employer] direct voice recognition software  Grammatical errors, random word insertion, spelling mistakes, and incomplete sentences may be an occasional consequence of the system secondary to software limitations, ambient noise and hardware issues  At the time of dictation, efforts were made to edit, clarify and /or correct errors  Please read the chart carefully and recognize, using context, where substitutions have occurred    If you have any questions or concerns about the context, text or information contained within the body of this dictation, please contact myself, the provider, for further clarification

## 2022-12-01 NOTE — PATIENT INSTRUCTIONS
Medicare Preventive Visit Patient Instructions  Thank you for completing your Welcome to Medicare Visit or Medicare Annual Wellness Visit today  Your next wellness visit will be due in one year (12/2/2023)  The screening/preventive services that you may require over the next 5-10 years are detailed below  Some tests may not apply to you based off risk factors and/or age  Screening tests ordered at today's visit but not completed yet may show as past due  Also, please note that scanned in results may not display below  Preventive Screenings:  Service Recommendations Previous Testing/Comments   Colorectal Cancer Screening  * Colonoscopy    * Fecal Occult Blood Test (FOBT)/Fecal Immunochemical Test (FIT)  * Fecal DNA/Cologuard Test  * Flexible Sigmoidoscopy Age: 39-70 years old   Colonoscopy: every 10 years (may be performed more frequently if at higher risk)  OR  FOBT/FIT: every 1 year  OR  Cologuard: every 3 years  OR  Sigmoidoscopy: every 5 years  Screening may be recommended earlier than age 39 if at higher risk for colorectal cancer  Also, an individualized decision between you and your healthcare provider will decide whether screening between the ages of 74-80 would be appropriate  Colonoscopy: 02/25/2021  FOBT/FIT: Not on file  Cologuard: Not on file  Sigmoidoscopy: Not on file    Screening Current     Breast Cancer Screening Age: 36 years old  Frequency: every 1-2 years  Not required if history of left and right mastectomy Mammogram: 03/25/2021    Screening Current   Cervical Cancer Screening Between the ages of 21-29, pap smear recommended once every 3 years  Between the ages of 33-67, can perform pap smear with HPV co-testing every 5 years     Recommendations may differ for women with a history of total hysterectomy, cervical cancer, or abnormal pap smears in past  Pap Smear: Not on file    Screening Not Indicated   Hepatitis C Screening Once for adults born between 1945 and 1965  More frequently in patients at high risk for Hepatitis C Hep C Antibody: Not on file        Diabetes Screening 1-2 times per year if you're at risk for diabetes or have pre-diabetes Fasting glucose: No results in last 5 years (No results in last 5 years)  A1C: 5 6; 5 6 (11/21/2019)      Cholesterol Screening Once every 5 years if you don't have a lipid disorder  May order more often based on risk factors  Lipid panel: 11/21/2022    Screening Not Indicated  History Lipid Disorder     Other Preventive Screenings Covered by Medicare:  1  Abdominal Aortic Aneurysm (AAA) Screening: covered once if your at risk  You're considered to be at risk if you have a family history of AAA  2  Lung Cancer Screening: covers low dose CT scan once per year if you meet all of the following conditions: (1) Age 50-69; (2) No signs or symptoms of lung cancer; (3) Current smoker or have quit smoking within the last 15 years; (4) You have a tobacco smoking history of at least 20 pack years (packs per day multiplied by number of years you smoked); (5) You get a written order from a healthcare provider  3  Glaucoma Screening: covered annually if you're considered high risk: (1) You have diabetes OR (2) Family history of glaucoma OR (3)  aged 48 and older OR (3)  American aged 72 and older  3  Osteoporosis Screening: covered every 2 years if you meet one of the following conditions: (1) You're estrogen deficient and at risk for osteoporosis based off medical history and other findings; (2) Have a vertebral abnormality; (3) On glucocorticoid therapy for more than 3 months; (4) Have primary hyperparathyroidism; (5) On osteoporosis medications and need to assess response to drug therapy  · Last bone density test (DXA Scan): Not on file  5  HIV Screening: covered annually if you're between the age of 12-76  Also covered annually if you are younger than 13 and older than 72 with risk factors for HIV infection   For pregnant patients, it is covered up to 3 times per pregnancy  Immunizations:  Immunization Recommendations   Influenza Vaccine Annual influenza vaccination during flu season is recommended for all persons aged >= 6 months who do not have contraindications   Pneumococcal Vaccine   * Pneumococcal conjugate vaccine = PCV13 (Prevnar 13), PCV15 (Vaxneuvance), PCV20 (Prevnar 20)  * Pneumococcal polysaccharide vaccine = PPSV23 (Pneumovax) Adults 25-60 years old: 1-3 doses may be recommended based on certain risk factors  Adults 72 years old: 1-2 doses may be recommended based off what pneumonia vaccine you previously received   Hepatitis B Vaccine 3 dose series if at intermediate or high risk (ex: diabetes, end stage renal disease, liver disease)   Tetanus (Td) Vaccine - COST NOT COVERED BY MEDICARE PART B Following completion of primary series, a booster dose should be given every 10 years to maintain immunity against tetanus  Td may also be given as tetanus wound prophylaxis  Tdap Vaccine - COST NOT COVERED BY MEDICARE PART B Recommended at least once for all adults  For pregnant patients, recommended with each pregnancy  Shingles Vaccine (Shingrix) - COST NOT COVERED BY MEDICARE PART B  2 shot series recommended in those aged 48 and above     Health Maintenance Due:      Topic Date Due   • Hepatitis C Screening  Never done   • Breast Cancer Screening: Mammogram  03/25/2022   • Colorectal Cancer Screening  02/25/2026     Immunizations Due:      Topic Date Due   • Hepatitis B Vaccine (1 of 3 - 3-dose series) Never done   • COVID-19 Vaccine (4 - Booster for Beauty Pi series) 03/15/2022     Advance Directives   What are advance directives? Advance directives are legal documents that state your wishes and plans for medical care  These plans are made ahead of time in case you lose your ability to make decisions for yourself  Advance directives can apply to any medical decision, such as the treatments you want, and if you want to donate organs  What are the types of advance directives? There are many types of advance directives, and each state has rules about how to use them  You may choose a combination of any of the following:  · Living will: This is a written record of the treatment you want  You can also choose which treatments you do not want, which to limit, and which to stop at a certain time  This includes surgery, medicine, IV fluid, and tube feedings  · Durable power of  for healthcare [facility]): This is a written record that states who you want to make healthcare choices for you when you are unable to make them for yourself  This person, called a proxy, is usually a family member or a friend  You may choose more than 1 proxy  · Do not resuscitate (DNR) order:  A DNR order is used in case your heart stops beating or you stop breathing  It is a request not to have certain forms of treatment, such as CPR  A DNR order may be included in other types of advance directives  · Medical directive: This covers the care that you want if you are in a coma, near death, or unable to make decisions for yourself  You can list the treatments you want for each condition  Treatment may include pain medicine, surgery, blood transfusions, dialysis, IV or tube feedings, and a ventilator (breathing machine)  · Values history: This document has questions about your views, beliefs, and how you feel and think about life  This information can help others choose the care that you would choose  Why are advance directives important? An advance directive helps you control your care  Although spoken wishes may be used, it is better to have your wishes written down  Spoken wishes can be misunderstood, or not followed  Treatments may be given even if you do not want them  An advance directive may make it easier for your family to make difficult choices about your care     Urinary Incontinence   Urinary incontinence (UI)  is when you lose control of your bladder  UI develops because your bladder cannot store or empty urine properly  The 3 most common types of UI are stress incontinence, urge incontinence, or both  Medicines:   · May be given to help strengthen your bladder control  Report any side effects of medication to your healthcare provider  Do pelvic muscle exercises often:  Your pelvic muscles help you stop urinating  Squeeze these muscles tight for 5 seconds, then relax for 5 seconds  Gradually work up to squeezing for 10 seconds  Do 3 sets of 15 repetitions a day, or as directed  This will help strengthen your pelvic muscles and improve bladder control  Train your bladder:  Go to the bathroom at set times, such as every 2 hours, even if you do not feel the urge to go  You can also try to hold your urine when you feel the urge to go  For example, hold your urine for 5 minutes when you feel the urge to go  As that becomes easier, hold your urine for 10 minutes  Self-care:   · Keep a UI record  Write down how often you leak urine and how much you leak  Make a note of what you were doing when you leaked urine  · Drink liquids as directed  You may need to limit the amount of liquid you drink to help control your urine leakage  Do not drink any liquid right before you go to bed  Limit or do not have drinks that contain caffeine or alcohol  · Prevent constipation  Eat a variety of high-fiber foods  Good examples are high-fiber cereals, beans, vegetables, and whole-grain breads  Walking is the best way to trigger your intestines to have a bowel movement  · Exercise regularly and maintain a healthy weight  Weight loss and exercise will decrease pressure on your bladder and help you control your leakage  · Use a catheter as directed  to help empty your bladder  A catheter is a tiny, plastic tube that is put into your bladder to drain your urine  · Go to behavior therapy as directed    Behavior therapy may be used to help you learn to control your urge to urinate  Weight Management   Why it is important to manage your weight:  Being overweight increases your risk of health conditions such as heart disease, high blood pressure, type 2 diabetes, and certain types of cancer  It can also increase your risk for osteoarthritis, sleep apnea, and other respiratory problems  Aim for a slow, steady weight loss  Even a small amount of weight loss can lower your risk of health problems  How to lose weight safely:  A safe and healthy way to lose weight is to eat fewer calories and get regular exercise  You can lose up about 1 pound a week by decreasing the number of calories you eat by 500 calories each day  Healthy meal plan for weight management:  A healthy meal plan includes a variety of foods, contains fewer calories, and helps you stay healthy  A healthy meal plan includes the following:  · Eat whole-grain foods more often  A healthy meal plan should contain fiber  Fiber is the part of grains, fruits, and vegetables that is not broken down by your body  Whole-grain foods are healthy and provide extra fiber in your diet  Some examples of whole-grain foods are whole-wheat breads and pastas, oatmeal, brown rice, and bulgur  · Eat a variety of vegetables every day  Include dark, leafy greens such as spinach, kale, johnny greens, and mustard greens  Eat yellow and orange vegetables such as carrots, sweet potatoes, and winter squash  · Eat a variety of fruits every day  Choose fresh or canned fruit (canned in its own juice or light syrup) instead of juice  Fruit juice has very little or no fiber  · Eat low-fat dairy foods  Drink fat-free (skim) milk or 1% milk  Eat fat-free yogurt and low-fat cottage cheese  Try low-fat cheeses such as mozzarella and other reduced-fat cheeses  · Choose meat and other protein foods that are low in fat  Choose beans or other legumes such as split peas or lentils   Choose fish, skinless poultry (chicken or turkey), or lean cuts of red meat (beef or pork)  Before you cook meat or poultry, cut off any visible fat  · Use less fat and oil  Try baking foods instead of frying them  Add less fat, such as margarine, sour cream, regular salad dressing and mayonnaise to foods  Eat fewer high-fat foods  Some examples of high-fat foods include french fries, doughnuts, ice cream, and cakes  · Eat fewer sweets  Limit foods and drinks that are high in sugar  This includes candy, cookies, regular soda, and sweetened drinks  Exercise:  Exercise at least 30 minutes per day on most days of the week  Some examples of exercise include walking, biking, dancing, and swimming  You can also fit in more physical activity by taking the stairs instead of the elevator or parking farther away from stores  Ask your healthcare provider about the best exercise plan for you  © Copyright [employer] 2018 Information is for End User's use only and may not be sold, redistributed or otherwise used for commercial purposes   All illustrations and images included in CareNotes® are the copyrighted property of [employer]  or [address] [place] [address]

## 2022-12-01 NOTE — PROGRESS NOTES
Assessment and Plan:     Problem List Items Addressed This Visit        Other    Hyperlipidemia - Primary    Relevant Orders    CBC and differential    Comprehensive metabolic panel    Lipid Panel with Direct LDL reflex   Other Visit Diagnoses     Vitamin D deficiency        Relevant Orders    CBC and differential    Comprehensive metabolic panel    Vitamin D 25 hydroxy    Screening for osteoporosis        Relevant Orders    CBC and differential    Comprehensive metabolic panel    DXA bone density spine hip and pelvis    Asymptomatic menopausal state        Relevant Orders    CBC and differential    Comprehensive metabolic panel    DXA bone density spine hip and pelvis    Medicare annual wellness visit, subsequent        Relevant Orders    CBC and differential    Comprehensive metabolic panel    Flu vaccine need        Relevant Orders    influenza vaccine, high-dose, PF 0 7 mL (FLUZONE HIGH-DOSE) (Completed)    CBC and differential    Comprehensive metabolic panel    Arthritis of left knee        Relevant Orders    Ambulatory Referral to Orthopedic Surgery    Encounter for screening mammogram for malignant neoplasm of breast        Relevant Orders    Mammo screening bilateral w 3d & cad           Preventive health issues were discussed with patient, and age appropriate screening tests were ordered as noted in patient's After Visit Summary  Personalized health advice and appropriate referrals for health education or preventive services given if needed, as noted in patient's After Visit Summary       History of Present Illness:     Patient presents for a Medicare Wellness Visit    HPI   Patient Care Team:  Abran Rodriguez DO as PCP - General (Internal Medicine)     Review of Systems:     Review of Systems     Problem List:     Patient Active Problem List   Diagnosis   • Hyperlipidemia   • Mixed rhinitis   • Dysphonia   • Pharyngoesophageal dysphagia   • Paresis of left vocal fold   • Muscle tension dysphonia   • Vocal fold atrophy   • Xerostomia   • Gluten intolerance   • Risk of exposure to Lyme disease   • MINI positive   • Dry nose      Past Medical and Surgical History:     Past Medical History:   Diagnosis Date   • Diverticulosis 2021   • Long term use of drug     RESOLVED: [id number]     History reviewed  No pertinent surgical history  Family History:     Family History   Problem Relation Age of Onset   • Colon cancer Mother    • Lung cancer Maternal Aunt    • Brain cancer Maternal Uncle       Social History:     Social History     Socioeconomic History   • Marital status:      Spouse name: None   • Number of children: None   • Years of education: None   • Highest education level: None   Occupational History   • None   Tobacco Use   • Smoking status: Never   • Smokeless tobacco: Never   Substance and Sexual Activity   • Alcohol use: Yes     Comment: SOCIAL    • Drug use: Not Currently     Comment: FORMER RECREATIONAL DRUG USER    • Sexual activity: Not Currently   Other Topics Concern   • None   Social History Narrative   • None     Social Determinants of Health     Financial Resource Strain: Low Risk    • Difficulty of Paying Living Expenses: Not hard at all   Food Insecurity: Not on file   Transportation Needs: No Transportation Needs   • Lack of Transportation (Medical): No   • Lack of Transportation (Non-Medical):  No   Physical Activity: Not on file   Stress: Not on file   Social Connections: Not on file   Intimate Partner Violence: Not on file   Housing Stability: Not on file      Medications and Allergies:     Current Outpatient Medications   Medication Sig Dispense Refill   • azelastine (ASTELIN) 0 1 % nasal spray 1 spray into each nostril 2 (two) times a day (Patient taking differently: 1 spray into each nostril 2 (two) times a day as needed) 1 Bottle 6   • Cholecalciferol (Vitamin D) 50 MCG (2000 UT) tablet Take 2,000 Units by mouth daily     • fluticasone (FLONASE) 50 mcg/act nasal spray 1 spray into each nostril daily 1 Bottle 0   • Lactobacillus-Inulin (PROBIOTIC DIGESTIVE SUPPORT PO) Take by mouth     • Multiple Vitamin (multivitamin) capsule Take 1 capsule by mouth daily     • acyclovir (ZOVIRAX) 400 MG tablet Take 1 tablet (400 mg total) by mouth 3 (three) times a day 90 tablet 1     No current facility-administered medications for this visit  No Known Allergies   Immunizations:     Immunization History   Administered Date(s) Administered   • COVID-19 MODERNA VACC 0 5 ML IM 02/08/2021, 03/08/2021, 11/15/2021   • INFLUENZA 12/17/2014, 12/30/2015, 11/27/2018   • Influenza Quadrivalent Preservative Free 3 years and older IM 10/17/2014   • Influenza Quadrivalent, 6-35 Months IM 11/25/2015   • Influenza Split High Dose Preservative Free IM 11/25/2016   • Influenza, high dose seasonal 0 7 mL 11/27/2018, 11/25/2019, 10/14/2020, 12/08/2021, 12/01/2022   • Pneumococcal Conjugate 13-Valent 11/25/2016   • Pneumococcal Polysaccharide PPV23 11/27/2018   • Tdap 08/06/2010, 07/17/2017      Health Maintenance:         Topic Date Due   • Hepatitis C Screening  Never done   • Breast Cancer Screening: Mammogram  03/25/2022   • Colorectal Cancer Screening  02/25/2026         Topic Date Due   • Hepatitis B Vaccine (1 of 3 - 3-dose series) Never done   • COVID-19 Vaccine (4 - Booster for Vanessa Boone series) 03/15/2022      Medicare Screening Tests and Risk Assessments:     Alhaji Zeng is here for her Subsequent Wellness visit  Last Medicare Wellness visit information reviewed, patient interviewed and updates made to the record today  Health Risk Assessment:   Patient rates overall health as very good  Patient feels that their physical health rating is same  Patient is satisfied with their life  Eyesight was rated as slightly worse  Hearing was rated as same  Patient feels that their emotional and mental health rating is same  Patients states they are never, rarely angry  Patient states they are sometimes unusually tired/fatigued  Pain experienced in the last 7 days has been some  Patient's pain rating has been 2/10  Patient states that she has experienced no weight loss or gain in last 6 months  Fall Risk Screening: In the past year, patient has experienced: no history of falling in past year      Urinary Incontinence Screening:   Patient has leaked urine accidently in the last six months  Home Safety:  Patient does not have trouble with stairs inside or outside of their home  Patient has working smoke alarms and has working carbon monoxide detector  Home safety hazards include: none  Nutrition:   Current diet is Regular  Medications:   Patient is currently taking over-the-counter supplements  OTC medications include: see medication list  Patient is able to manage medications  Activities of Daily Living (ADLs)/Instrumental Activities of Daily Living (IADLs):   Walk and transfer into and out of bed and chair?: Yes  Dress and groom yourself?: Yes    Bathe or shower yourself?: Yes    Feed yourself? Yes  Do your laundry/housekeeping?: Yes  Manage your money, pay your bills and track your expenses?: Yes  Make your own meals?: Yes    Do your own shopping?: Yes    Previous Hospitalizations:   Any hospitalizations or ED visits within the last 12 months?: No      Advance Care Planning:   Living will: Yes    Durable POA for healthcare:  Yes    Advanced directive: Yes    Five wishes given: Yes      PREVENTIVE SCREENINGS      Cardiovascular Screening:    General: Screening Not Indicated and History Lipid Disorder      Colorectal Cancer Screening:     General: Screening Current      Breast Cancer Screening:     General: Screening Current      Cervical Cancer Screening:    General: Screening Not Indicated      Lung Cancer Screening:     General: Screening Not Indicated    Screening, Brief Intervention, and Referral to Treatment (SBIRT)    Screening      AUDIT-C Screenin) How often did you have a drink containing alcohol in the past year? never  2) How many drinks did you have on a typical day when you were drinking in the past year? 0  3) How often did you have 6 or more drinks on one occasion in the past year? never    AUDIT-C Score: 0  Interpretation: Score 0-2 (female): Negative screen for alcohol misuse    Single Item Drug Screening:  How often have you used an illegal drug (including marijuana) or a prescription medication for non-medical reasons in the past year? never    Single Item Drug Screen Score: 0  Interpretation: Negative screen for possible drug use disorder    No results found       Physical Exam:     /82   Pulse 84   Resp 15   Ht 5' 6" (1 676 m)   Wt 78 3 kg (172 lb 9 6 oz)   SpO2 99%   BMI 27 86 kg/m²     Physical Exam     Mike Garcia,

## 2022-12-12 ENCOUNTER — TELEPHONE (OUTPATIENT)
Dept: ADMINISTRATIVE | Facility: OTHER | Age: 72
End: 2022-12-12

## 2022-12-12 NOTE — TELEPHONE ENCOUNTER
Upon review of the In Basket request we were able to locate, review, and update the patient chart as requested for Mammogram     Any additional questions or concerns should be emailed to the Practice Liaisons via the appropriate education email address, please do not reply via In Basket      Thank you  Donaldo Layne

## 2022-12-12 NOTE — TELEPHONE ENCOUNTER
----- Message from Mat Millan MA sent at 12/9/2022 11:27 AM EST -----  Regarding: Mammo  12/09/22 11:27 AM    Hello, our patient Reagan Soto has had Mammogram completed/performed  Please assist in updating the patient chart by pulling the Care Everywhere (CE) document  The date of service is 04/05/2022       Thank you,  Mat Millan MA  [facility] INTERNAL MED

## 2023-04-11 DIAGNOSIS — Z12.31 ENCOUNTER FOR SCREENING MAMMOGRAM FOR MALIGNANT NEOPLASM OF BREAST: ICD-10-CM

## 2023-12-04 ENCOUNTER — CLINICAL SUPPORT (OUTPATIENT)
Dept: INTERNAL MEDICINE CLINIC | Facility: CLINIC | Age: 73
End: 2023-12-04
Payer: MEDICARE

## 2023-12-04 DIAGNOSIS — Z23 ENCOUNTER FOR IMMUNIZATION: Primary | ICD-10-CM

## 2023-12-04 PROCEDURE — 90662 IIV NO PRSV INCREASED AG IM: CPT | Performed by: INTERNAL MEDICINE

## 2023-12-04 PROCEDURE — G0008 ADMIN INFLUENZA VIRUS VAC: HCPCS | Performed by: INTERNAL MEDICINE

## 2024-03-12 ENCOUNTER — TELEPHONE (OUTPATIENT)
Dept: INTERNAL MEDICINE CLINIC | Facility: CLINIC | Age: 74
End: 2024-03-12

## 2024-03-12 DIAGNOSIS — Z12.31 ENCOUNTER FOR SCREENING MAMMOGRAM FOR MALIGNANT NEOPLASM OF BREAST: Primary | ICD-10-CM

## 2024-03-12 NOTE — TELEPHONE ENCOUNTER
Pt called asking for a referral for a mammogram so she can get her mammo done at [facility] - she already has an appt set up in April.

## 2024-05-30 ENCOUNTER — TELEPHONE (OUTPATIENT)
Age: 74
End: 2024-05-30

## 2024-05-30 DIAGNOSIS — E55.9 VITAMIN D DEFICIENCY: ICD-10-CM

## 2024-05-30 DIAGNOSIS — L29.9 PRURITUS, UNSPECIFIED: ICD-10-CM

## 2024-05-30 DIAGNOSIS — Z13.1 SCREENING FOR DIABETES MELLITUS: ICD-10-CM

## 2024-05-30 DIAGNOSIS — Z13.6 SCREENING FOR CARDIOVASCULAR CONDITION: ICD-10-CM

## 2024-05-30 DIAGNOSIS — E78.2 MIXED HYPERLIPIDEMIA: Primary | ICD-10-CM

## 2024-05-30 DIAGNOSIS — R79.9 ABNORMAL FINDING OF BLOOD CHEMISTRY, UNSPECIFIED: ICD-10-CM

## 2024-06-05 ENCOUNTER — ANNUAL EXAM (OUTPATIENT)
Dept: OBGYN CLINIC | Facility: CLINIC | Age: 74
End: 2024-06-05
Payer: MEDICARE

## 2024-06-05 VITALS
DIASTOLIC BLOOD PRESSURE: 88 MMHG | WEIGHT: 177 LBS | SYSTOLIC BLOOD PRESSURE: 160 MMHG | HEIGHT: 66 IN | BODY MASS INDEX: 28.45 KG/M2

## 2024-06-05 DIAGNOSIS — Z01.419 ENCOUNTER FOR ANNUAL ROUTINE GYNECOLOGICAL EXAMINATION: Primary | ICD-10-CM

## 2024-06-05 DIAGNOSIS — Z12.31 ENCOUNTER FOR SCREENING MAMMOGRAM FOR MALIGNANT NEOPLASM OF BREAST: ICD-10-CM

## 2024-06-05 PROCEDURE — G0101 CA SCREEN;PELVIC/BREAST EXAM: HCPCS | Performed by: OBSTETRICS & GYNECOLOGY

## 2024-06-05 RX ORDER — KETOCONAZOLE 20 MG/G
CREAM TOPICAL
COMMUNITY
Start: 2024-04-25

## 2024-06-05 RX ORDER — CICLOPIROX 80 MG/ML
SOLUTION TOPICAL
COMMUNITY
Start: 2024-04-25 | End: 2024-10-24

## 2024-06-05 RX ORDER — AMOXICILLIN 500 MG/1
500 CAPSULE ORAL 3 TIMES DAILY
COMMUNITY
Start: 2024-04-30

## 2024-06-05 NOTE — PROGRESS NOTES
Ambulatory Visit  Name: Delmy Curiel      : 1950      MRN: [id number]  Encounter Provider: Socorro Moreno DO  Encounter Date: 2024   Encounter department: [facility]    Assessment & Plan   1. Encounter for annual routine gynecological examination  2. Encounter for screening mammogram for malignant neoplasm of breast  -     Mammo screening bilateral w 3d & cad; Future    Pap deferred due to low risk status.  Encouraged self breast examination as well as calcium supplementation.  Continue annual mammogram.  Reviewed colon cancer screening up-to-date.  She will continue to follow-up with primary care as scheduled.  Return to office in 2 years per Medicare recommendations/protocol or as needed      History of Present Illness       Delmy Curiel is a 74 y.o. female who presents     This is a pleasant 74-year-old female P2 ( x 2) presents for her GYN exam.  She went through menopause at age 51.  She has never been on hormone replacement therapy.  She denies any vaginal bleeding or spotting.  No changes in bowel or bladder function.  Patient is  .  She has not been sexually active.  Pap smears have been normal.  Last Pap age 66.    She does follow-up with family practice on a regular basis.  She also sees dermatology once a year.    Last GYN exam .    Mammo 2024    Colon     Dexa ordered    Pap    Derm q yr    Review of Systems   Constitutional:  Negative for fatigue, fever and unexpected weight change.   Respiratory:  Negative for cough, chest tightness, shortness of breath and wheezing.    Cardiovascular: Negative.  Negative for chest pain and palpitations.   Gastrointestinal: Negative.  Negative for abdominal distention, abdominal pain, blood in stool, constipation, diarrhea, nausea and vomiting.   Genitourinary: Negative.  Negative for difficulty urinating, dyspareunia, dysuria, flank pain, frequency, genital sores, hematuria, pelvic pain,  "urgency, vaginal bleeding, vaginal discharge and vaginal pain.   Skin:  Negative for rash.     Current Outpatient Medications on File Prior to Visit   Medication Sig Dispense Refill    amoxicillin (AMOXIL) 500 mg capsule 500 mg 3 (three) times a day      Cholecalciferol (Vitamin D) 50 MCG (2000 UT) tablet Take 2,000 Units by mouth daily      Cyanocobalamin (B-12 PO) Take by mouth      Lactobacillus-Inulin (PROBIOTIC DIGESTIVE SUPPORT PO) Take by mouth      Multiple Vitamin (multivitamin) capsule Take 1 capsule by mouth daily      acyclovir (ZOVIRAX) 400 MG tablet Take 1 tablet (400 mg total) by mouth 3 (three) times a day 90 tablet 1    azelastine (ASTELIN) 0.1 % nasal spray 1 spray into each nostril 2 (two) times a day (Patient not taking: Reported on 6/5/2024) 1 Bottle 6    ciclopirox (PENLAC) 8 % solution Apply topically (Patient not taking: Reported on 6/5/2024)      fluticasone (FLONASE) 50 mcg/act nasal spray 1 spray into each nostril daily 1 Bottle 0    ketoconazole (NIZORAL) 2 % cream APPLY TO AFFECTED AREA TOPICALLY EVERY DAY (Patient not taking: Reported on 6/5/2024)       No current facility-administered medications on file prior to visit.      Objective     /88   Ht 5' 6\" (1.676 m)   Wt 80.3 kg (177 lb)   BMI 28.57 kg/m²     Physical Exam  Constitutional:       Appearance: Normal appearance. She is well-developed.   HENT:      Head: Normocephalic and atraumatic.   Cardiovascular:      Rate and Rhythm: Normal rate and regular rhythm.   Pulmonary:      Effort: Pulmonary effort is normal.      Breath sounds: Normal breath sounds.   Chest:   Breasts:     Right: No inverted nipple, mass, nipple discharge, skin change or tenderness.      Left: No inverted nipple, mass, nipple discharge, skin change or tenderness.   Abdominal:      General: Bowel sounds are normal. There is no distension.      Palpations: Abdomen is soft.      Tenderness: There is no abdominal tenderness. There is no guarding or " rebound.   Genitourinary:     Labia:         Right: No rash, tenderness or lesion.         Left: No rash, tenderness or lesion.       Vagina: Normal. No signs of injury. No vaginal discharge or tenderness.      Cervix: No cervical motion tenderness, discharge, friability, lesion or cervical bleeding.      Uterus: Not enlarged and not tender.       Adnexa:         Right: No mass, tenderness or fullness.          Left: No mass, tenderness or fullness.     Neurological:      Mental Status: She is alert.   Psychiatric:         Behavior: Behavior normal.       Administrative Statements   I have spent a total time of 30 minutes on 06/05/24 In caring for this patient including Impressions, Counseling / Coordination of care, Documenting in the medical record, Reviewing / ordering tests, medicine, procedures  , and Obtaining or reviewing history  .

## 2024-07-03 ENCOUNTER — APPOINTMENT (OUTPATIENT)
Dept: LAB | Facility: CLINIC | Age: 74
End: 2024-07-03
Payer: MEDICARE

## 2024-07-03 DIAGNOSIS — R79.9 ABNORMAL FINDING OF BLOOD CHEMISTRY, UNSPECIFIED: ICD-10-CM

## 2024-07-03 DIAGNOSIS — E78.2 MIXED HYPERLIPIDEMIA: ICD-10-CM

## 2024-07-03 DIAGNOSIS — Z13.1 SCREENING FOR DIABETES MELLITUS: ICD-10-CM

## 2024-07-03 DIAGNOSIS — L29.9 PRURITUS, UNSPECIFIED: ICD-10-CM

## 2024-07-03 DIAGNOSIS — Z13.6 SCREENING FOR CARDIOVASCULAR CONDITION: ICD-10-CM

## 2024-07-03 DIAGNOSIS — E55.9 VITAMIN D DEFICIENCY: ICD-10-CM

## 2024-07-03 LAB
25(OH)D3 SERPL-MCNC: 34.2 NG/ML (ref 30–100)
ALBUMIN SERPL BCG-MCNC: 4 G/DL (ref 3.5–5)
ALP SERPL-CCNC: 80 U/L (ref 34–104)
ALT SERPL W P-5'-P-CCNC: 14 U/L (ref 7–52)
ANION GAP SERPL CALCULATED.3IONS-SCNC: 10 MMOL/L (ref 4–13)
AST SERPL W P-5'-P-CCNC: 16 U/L (ref 13–39)
BACTERIA UR QL AUTO: ABNORMAL /HPF
BASOPHILS # BLD AUTO: 0.09 THOUSANDS/ÂΜL (ref 0–0.1)
BASOPHILS NFR BLD AUTO: 1 % (ref 0–1)
BILIRUB SERPL-MCNC: 0.81 MG/DL (ref 0.2–1)
BILIRUB UR QL STRIP: NEGATIVE
BUN SERPL-MCNC: 17 MG/DL (ref 5–25)
CALCIUM SERPL-MCNC: 9.3 MG/DL (ref 8.4–10.2)
CHLORIDE SERPL-SCNC: 102 MMOL/L (ref 96–108)
CHOLEST SERPL-MCNC: 194 MG/DL
CLARITY UR: CLEAR
CO2 SERPL-SCNC: 24 MMOL/L (ref 21–32)
COLOR UR: ABNORMAL
CREAT SERPL-MCNC: 0.73 MG/DL (ref 0.6–1.3)
EOSINOPHIL # BLD AUTO: 0.07 THOUSAND/ÂΜL (ref 0–0.61)
EOSINOPHIL NFR BLD AUTO: 1 % (ref 0–6)
ERYTHROCYTE [DISTWIDTH] IN BLOOD BY AUTOMATED COUNT: 14 % (ref 11.6–15.1)
EST. AVERAGE GLUCOSE BLD GHB EST-MCNC: 117 MG/DL
GFR SERPL CREATININE-BSD FRML MDRD: 81 ML/MIN/1.73SQ M
GLUCOSE P FAST SERPL-MCNC: 74 MG/DL (ref 65–99)
GLUCOSE UR STRIP-MCNC: NEGATIVE MG/DL
HBA1C MFR BLD: 5.7 %
HCT VFR BLD AUTO: 44 % (ref 34.8–46.1)
HDLC SERPL-MCNC: 77 MG/DL
HGB BLD-MCNC: 13.9 G/DL (ref 11.5–15.4)
HGB UR QL STRIP.AUTO: NEGATIVE
IMM GRANULOCYTES # BLD AUTO: 0.02 THOUSAND/UL (ref 0–0.2)
IMM GRANULOCYTES NFR BLD AUTO: 0 % (ref 0–2)
KETONES UR STRIP-MCNC: NEGATIVE MG/DL
LDLC SERPL CALC-MCNC: 109 MG/DL (ref 0–100)
LEUKOCYTE ESTERASE UR QL STRIP: ABNORMAL
LYMPHOCYTES # BLD AUTO: 1.13 THOUSANDS/ÂΜL (ref 0.6–4.47)
LYMPHOCYTES NFR BLD AUTO: 16 % (ref 14–44)
MCH RBC QN AUTO: 28 PG (ref 26.8–34.3)
MCHC RBC AUTO-ENTMCNC: 31.6 G/DL (ref 31.4–37.4)
MCV RBC AUTO: 89 FL (ref 82–98)
MONOCYTES # BLD AUTO: 0.6 THOUSAND/ÂΜL (ref 0.17–1.22)
MONOCYTES NFR BLD AUTO: 9 % (ref 4–12)
MUCOUS THREADS UR QL AUTO: ABNORMAL
NEUTROPHILS # BLD AUTO: 5.17 THOUSANDS/ÂΜL (ref 1.85–7.62)
NEUTS SEG NFR BLD AUTO: 73 % (ref 43–75)
NITRITE UR QL STRIP: NEGATIVE
NON-SQ EPI CELLS URNS QL MICRO: ABNORMAL /HPF
NONHDLC SERPL-MCNC: 117 MG/DL
NRBC BLD AUTO-RTO: 0 /100 WBCS
PH UR STRIP.AUTO: 6 [PH]
PLATELET # BLD AUTO: 247 THOUSANDS/UL (ref 149–390)
PMV BLD AUTO: 10.9 FL (ref 8.9–12.7)
POTASSIUM SERPL-SCNC: 4 MMOL/L (ref 3.5–5.3)
PROT SERPL-MCNC: 6.2 G/DL (ref 6.4–8.4)
PROT UR STRIP-MCNC: NEGATIVE MG/DL
RBC # BLD AUTO: 4.97 MILLION/UL (ref 3.81–5.12)
RBC #/AREA URNS AUTO: ABNORMAL /HPF
SODIUM SERPL-SCNC: 136 MMOL/L (ref 135–147)
SP GR UR STRIP.AUTO: 1.01 (ref 1–1.03)
TRIGL SERPL-MCNC: 41 MG/DL
TSH SERPL DL<=0.05 MIU/L-ACNC: 1.57 UIU/ML (ref 0.45–4.5)
UROBILINOGEN UR STRIP-ACNC: <2 MG/DL
WBC # BLD AUTO: 7.08 THOUSAND/UL (ref 4.31–10.16)
WBC #/AREA URNS AUTO: ABNORMAL /HPF

## 2024-07-03 PROCEDURE — 83036 HEMOGLOBIN GLYCOSYLATED A1C: CPT

## 2024-07-03 PROCEDURE — 84443 ASSAY THYROID STIM HORMONE: CPT

## 2024-07-03 PROCEDURE — 82306 VITAMIN D 25 HYDROXY: CPT

## 2024-07-03 PROCEDURE — 80053 COMPREHEN METABOLIC PANEL: CPT

## 2024-07-03 PROCEDURE — 80061 LIPID PANEL: CPT

## 2024-07-03 PROCEDURE — 81001 URINALYSIS AUTO W/SCOPE: CPT

## 2024-07-03 PROCEDURE — 36415 COLL VENOUS BLD VENIPUNCTURE: CPT

## 2024-07-03 PROCEDURE — 85025 COMPLETE CBC W/AUTO DIFF WBC: CPT

## 2024-07-08 ENCOUNTER — OFFICE VISIT (OUTPATIENT)
Dept: FAMILY MEDICINE CLINIC | Facility: CLINIC | Age: 74
End: 2024-07-08
Payer: MEDICARE

## 2024-07-08 VITALS
DIASTOLIC BLOOD PRESSURE: 67 MMHG | SYSTOLIC BLOOD PRESSURE: 143 MMHG | WEIGHT: 174.4 LBS | BODY MASS INDEX: 28.03 KG/M2 | HEART RATE: 76 BPM | OXYGEN SATURATION: 100 % | TEMPERATURE: 97.4 F | RESPIRATION RATE: 16 BRPM | HEIGHT: 66 IN

## 2024-07-08 DIAGNOSIS — Z78.0 POSTMENOPAUSAL: ICD-10-CM

## 2024-07-08 DIAGNOSIS — J31.0 MIXED RHINITIS: ICD-10-CM

## 2024-07-08 DIAGNOSIS — E78.2 MIXED HYPERLIPIDEMIA: ICD-10-CM

## 2024-07-08 DIAGNOSIS — Z23 NEED FOR VACCINATION: ICD-10-CM

## 2024-07-08 DIAGNOSIS — E55.9 VITAMIN D DEFICIENCY: ICD-10-CM

## 2024-07-08 DIAGNOSIS — Z00.00 MEDICARE ANNUAL WELLNESS VISIT, SUBSEQUENT: Primary | ICD-10-CM

## 2024-07-08 DIAGNOSIS — R73.03 PREDIABETES: ICD-10-CM

## 2024-07-08 DIAGNOSIS — Z11.59 NEED FOR HEPATITIS C SCREENING TEST: ICD-10-CM

## 2024-07-08 PROCEDURE — G0439 PPPS, SUBSEQ VISIT: HCPCS | Performed by: FAMILY MEDICINE

## 2024-07-08 PROCEDURE — 99214 OFFICE O/P EST MOD 30 MIN: CPT | Performed by: FAMILY MEDICINE

## 2024-07-08 RX ORDER — FLUTICASONE PROPIONATE 50 MCG
1 SPRAY, SUSPENSION (ML) NASAL DAILY
Qty: 9.9 ML | Refills: 2 | Status: SHIPPED | OUTPATIENT
Start: 2024-07-08

## 2024-07-08 RX ORDER — LEVOCETIRIZINE DIHYDROCHLORIDE 5 MG/1
5 TABLET, FILM COATED ORAL EVERY EVENING
Qty: 30 TABLET | Refills: 2 | Status: SHIPPED | OUTPATIENT
Start: 2024-07-08

## 2024-07-08 RX ORDER — ZOSTER VACCINE RECOMBINANT, ADJUVANTED 50 MCG/0.5
0.5 KIT INTRAMUSCULAR ONCE
Qty: 1 EACH | Refills: 1 | Status: SHIPPED | OUTPATIENT
Start: 2024-07-08 | End: 2024-07-08

## 2024-07-08 NOTE — PROGRESS NOTES
Assessment and Plan:     Problem List Items Addressed This Visit     Hyperlipidemia    Relevant Orders    Comprehensive metabolic panel    Lipid panel    Mixed rhinitis    Relevant Medications    fluticasone (FLONASE) 50 mcg/act nasal spray    levocetirizine (XYZAL) 5 MG tablet    Other Relevant Orders    CBC and differential    Comprehensive metabolic panel    Allergy Evaluation 1, [place]    Prediabetes    Relevant Orders    Comprehensive metabolic panel    TSH, 3rd generation with Free T4 reflex    Hemoglobin A1C    UA w Reflex to Microscopic w Reflex to Culture    Vitamin D deficiency    Relevant Orders    Vitamin D 25 hydroxy   Other Visit Diagnoses     Medicare annual wellness visit, subsequent    -  Primary    Postmenopausal        Relevant Orders    DXA bone density spine hip and pelvis    Need for hepatitis C screening test        Relevant Orders    Hepatitis C antibody    Need for vaccination        Relevant Medications    Zoster Vac Recomb Adjuvanted (Shingrix) 50 MCG/0.5ML SUSR    RSV Pre-Fusion F A&B Vac Rcmb (Abrysvo) SOLR vaccine          Regarding her Medicare annual well visit, patient is given age and diagnosis appropriate evaluation and care.  Patient's most recent blood work and urine discussed with patient.  She agreed to getting screened for hepatitis C with her next blood work as well.  She is ordered more blood work before her next visit as well.  Patient is due for bone density and is ordered after discussing it with her.  Patient is also ordered for Shingrix and RSV vaccine as discussed with patient.  Regarding her hyperlipidemia, patient's LFTs were normal.  Total cholesterol is 194, triglycerides 41, HDL 77 and her LDL is 109.  Patient does admit to having pastries and desserts.  Patient is advised to cut them down.  Cut down fats/starches/sweets.  Lose weight with a better diet and exercise as tolerated.  Will recheck her labs again in 6 months.  Regarding her prediabetes her glucose  was 74 and her A1c went up to 5.7 from 5.6 back in November 2019.  Again patient admits to having dessert.  Patient advised to cut down on sweets and starches.  Exercise as tolerated.  I will recheck labs again in 6 months.  Regarding her allergies, discussed with patient.  Patient will add an allergy panel to her next blood work is ordered by me after discussing with patient  Patient will use Flonase nasal spray in the morning and take Xyzal at night.  Patient will also use Pataday eyedrops as needed.  Patient education.  Regarding her vitamin D deficiency patient will continue supplementation of vitamin D.  Patient does admit to not taking it daily.  And patient takes vitamin D3 2000 units 2 tablets daily as per patient.  Will recheck her lab with the next blood work in 6 months.  Regarding her postmenopausal status, discussed with patient.  Patient advised to get the bone density test.  RTO 6 months and do the blood work and urine before.              Depression Screening and Follow-up Plan: Patient was screened for depression during today's encounter. They screened negative with a PHQ-2 score of 0.      Preventive health issues were discussed with patient, and age appropriate screening tests were ordered as noted in patient's After Visit Summary.  Personalized health advice and appropriate referrals for health education or preventive services given if needed, as noted in patient's After Visit Summary.     History of Present Illness:     Patient presents for a Medicare Wellness Visit    74-year-old female, new patient to me but not the practice, here for her Medicare annual well visit.  Patient also like to be evaluated for her lipids and sugar.  Patient did blood work and urine last week.  Patient needs the RSV vaccine and the shingles vaccine.  Patient is up-to-date with her mammogram and her GYN and her colonoscopy.  Patient does need a bone density test.  Patient does have allergies that she does not treat on  a regular basis but does have symptoms from it.       Patient Care Team:  Santiago Fisher MD as PCP - General (Family Medicine)     Review of Systems:     Review of Systems   Constitutional:  Negative for activity change, appetite change, chills, fatigue, fever and unexpected weight change.   HENT:  Negative for congestion, hearing loss and sore throat.    Eyes:  Negative for visual disturbance.   Respiratory:  Negative for cough and shortness of breath.    Cardiovascular:  Negative for chest pain and palpitations.   Gastrointestinal:  Negative for abdominal pain, blood in stool, constipation, diarrhea, nausea and vomiting.   Genitourinary:  Negative for dysuria and hematuria.   Musculoskeletal:  Positive for arthralgias.        Left knee arthritis that she follows up with Ortho.   Skin:  Negative for rash.   Neurological:  Negative for dizziness and headaches.   Psychiatric/Behavioral:  Negative for dysphoric mood and sleep disturbance. The patient is not nervous/anxious.       Problem List:     Patient Active Problem List   Diagnosis   • Hyperlipidemia   • Mixed rhinitis   • Dysphonia   • Pharyngoesophageal dysphagia   • Paresis of left vocal fold   • Muscle tension dysphonia   • Vocal fold atrophy   • Xerostomia   • Gluten intolerance   • Risk of exposure to Lyme disease   • MINI positive   • Dry nose   • Prediabetes   • Vitamin D deficiency      Past Medical and Surgical History:     Past Medical History:   Diagnosis Date   • Diverticulosis 2021   • Long term use of drug     RESOLVED: 17OCT2014     History reviewed. No pertinent surgical history.   Family History:     Family History   Problem Relation Age of Onset   • Colon cancer Mother    • Lung cancer Maternal Aunt    • Brain cancer Maternal Uncle       Social History:     Social History     Socioeconomic History   • Marital status:      Spouse name: None   • Number of children: None   • Years of education: None   • Highest education level: None    Occupational History   • None   Tobacco Use   • Smoking status: Never   • Smokeless tobacco: Never   Substance and Sexual Activity   • Alcohol use: Yes     Comment: SOCIAL    • Drug use: Not Currently     Comment: FORMER RECREATIONAL DRUG USER    • Sexual activity: Not Currently   Other Topics Concern   • None   Social History Narrative   • None     Social Determinants of Health     Financial Resource Strain: Low Risk  (12/1/2022)    Overall Financial Resource Strain (CARDIA)    • Difficulty of Paying Living Expenses: Not hard at all   Food Insecurity: No Food Insecurity (7/8/2024)    Hunger Vital Sign    • Worried About Running Out of Food in the Last Year: Never true    • Ran Out of Food in the Last Year: Never true   Transportation Needs: Unknown (7/8/2024)    PRAPARE - Transportation    • Lack of Transportation (Medical): No    • Lack of Transportation (Non-Medical): Not on file   Physical Activity: Not on file   Stress: Not on file   Social Connections: Not on file   Intimate Partner Violence: Not on file   Housing Stability: Unknown (7/8/2024)    Housing Stability Vital Sign    • Unable to Pay for Housing in the Last Year: No    • Number of Times Moved in the Last Year: Not on file    • Homeless in the Last Year: No      Medications and Allergies:     Current Outpatient Medications   Medication Sig Dispense Refill   • Cholecalciferol (Vitamin D) 50 MCG (2000 UT) tablet Take 2,000 Units by mouth daily     • Cyanocobalamin (B-12 PO) Take by mouth     • fluticasone (FLONASE) 50 mcg/act nasal spray 1 spray into each nostril daily 9.9 mL 2   • hydrocortisone 2.5 % ointment      • ketoconazole (NIZORAL) 2 % cream      • Lactobacillus-Inulin (PROBIOTIC DIGESTIVE SUPPORT PO) Take by mouth     • levocetirizine (XYZAL) 5 MG tablet Take 1 tablet (5 mg total) by mouth every evening 30 tablet 2   • Multiple Vitamin (multivitamin) capsule Take 1 capsule by mouth daily     • RSV Pre-Fusion F A&B Vac Rcmb (Abrysvo) SOLR  vaccine Inject 0.5 mL into a muscle once for 1 dose 1 each 0   • Zoster Vac Recomb Adjuvanted (Shingrix) 50 MCG/0.5ML SUSR Inject 0.5 mL into a muscle once for 1 dose Repeat dose in 2 to 6 months 1 each 1   • acyclovir (ZOVIRAX) 400 MG tablet Take 1 tablet (400 mg total) by mouth 3 (three) times a day 90 tablet 1   • azelastine (ASTELIN) 0.1 % nasal spray 1 spray into each nostril 2 (two) times a day (Patient not taking: Reported on 6/5/2024) 1 Bottle 6     No current facility-administered medications for this visit.     No Known Allergies   Immunizations:     Immunization History   Administered Date(s) Administered   • COVID-19 MODERNA VACC 0.5 ML IM 02/08/2021, 03/08/2021, 11/15/2021   • INFLUENZA 12/17/2014, 12/30/2015, 11/27/2018   • Influenza Quadrivalent Preservative Free 3 years and older IM 10/17/2014   • Influenza Quadrivalent, 6-35 Months IM 11/25/2015   • Influenza Split High Dose Preservative Free IM 11/25/2016   • Influenza, high dose seasonal 0.7 mL 11/27/2018, 11/25/2019, 10/14/2020, 12/08/2021, 12/01/2022, 12/04/2023   • Pneumococcal Conjugate 13-Valent 11/25/2016   • Pneumococcal Polysaccharide PPV23 11/27/2018   • Tdap 08/06/2010, 07/17/2017      Health Maintenance:         Topic Date Due   • Hepatitis C Screening  Never done   • Breast Cancer Screening: Mammogram  04/10/2025   • Colorectal Cancer Screening  02/25/2026         Topic Date Due   • COVID-19 Vaccine (4 - 2023-24 season) 09/01/2023   • Influenza Vaccine (1) 09/01/2024      Medicare Screening Tests and Risk Assessments:     Delmy is here for her Subsequent Wellness visit. Last Medicare Wellness visit information reviewed, patient interviewed and updates made to the record today.      Health Risk Assessment:   Patient rates overall health as good. Patient feels that their physical health rating is same. Patient is satisfied with their life. Eyesight was rated as same. Hearing was rated as slightly worse. Patient feels that their  emotional and mental health rating is same. Patients states they are never, rarely angry. Patient states they are never, rarely unusually tired/fatigued. Pain experienced in the last 7 days has been none. Patient states that she has experienced no weight loss or gain in last 6 months.     Depression Screening:   PHQ-2 Score: 0      Fall Risk Screening:   In the past year, patient has experienced: no history of falling in past year      Urinary Incontinence Screening:   Patient has not leaked urine accidently in the last six months.     Home Safety:  Patient does not have trouble with stairs inside or outside of their home. Patient has no working smoke alarms and has no working carbon monoxide detector. Home safety hazards include: none.     Nutrition:   Current diet is Regular.     Medications:   Patient is not currently taking any over-the-counter supplements. Patient is able to manage medications.     Activities of Daily Living (ADLs)/Instrumental Activities of Daily Living (IADLs):   Walk and transfer into and out of bed and chair?: Yes  Dress and groom yourself?: Yes    Bathe or shower yourself?: Yes    Feed yourself? Yes  Do your laundry/housekeeping?: Yes  Manage your money, pay your bills and track your expenses?: Yes  Make your own meals?: Yes    Do your own shopping?: Yes    Previous Hospitalizations:   Any hospitalizations or ED visits within the last 12 months?: No      Advance Care Planning:   Living will: No    Advanced directive: No    Five wishes given: No      Comments: Patient has the five wishes at home she has not completed.  Discussed with patient.    PREVENTIVE SCREENINGS      Cardiovascular Screening:    General: Screening Not Indicated and History Lipid Disorder      Diabetes Screening:     General: Screening Current      Colorectal Cancer Screening:     General: Screening Current      Breast Cancer Screening:     General: Screening Current      Cervical Cancer Screening:    General: Screening  "Not Indicated      Osteoporosis Screening:    General: Risks and Benefits Discussed    Due for: Bone Density Ultrasound      Lung Cancer Screening:     General: Screening Not Indicated    Screening, Brief Intervention, and Referral to Treatment (SBIRT)    Screening  Typical number of drinks in a day: 0  Typical number of drinks in a week: 0  Interpretation: Low risk drinking behavior.    AUDIT-C Screenin) How often did you have a drink containing alcohol in the past year? never  2) How many drinks did you have on a typical day when you were drinking in the past year? 0  3) How often did you have 6 or more drinks on one occasion in the past year? never    AUDIT-C Score: 0  Interpretation: Score 0-2 (female): Negative screen for alcohol misuse    Single Item Drug Screening:  How often have you used an illegal drug (including marijuana) or a prescription medication for non-medical reasons in the past year? never    Single Item Drug Screen Score: 0  Interpretation: Negative screen for possible drug use disorder    Other Counseling Topics:   Car/seat belt/driving safety, skin self-exam, sunscreen and calcium and vitamin D intake and regular weightbearing exercise.     No results found.     Physical Exam:     /67 (BP Location: Left arm, Patient Position: Sitting, Cuff Size: Adult)   Pulse 76   Temp (!) 97.4 °F (36.3 °C) (Temporal)   Resp 16   Ht 5' 6\" (1.676 m)   Wt 79.1 kg (174 lb 6.4 oz)   SpO2 100%   BMI 28.15 kg/m²     Physical Exam  Vitals reviewed.   Constitutional:       General: She is not in acute distress.     Appearance: Normal appearance. She is not ill-appearing.   HENT:      Head: Normocephalic and atraumatic.      Right Ear: Tympanic membrane, ear canal and external ear normal.      Left Ear: Tympanic membrane, ear canal and external ear normal.      Mouth/Throat:      Mouth: Mucous membranes are moist.      Pharynx: Oropharynx is clear.   Eyes:      Extraocular Movements: Extraocular " movements intact.      Conjunctiva/sclera: Conjunctivae normal.   Neck:      Vascular: No carotid bruit.   Cardiovascular:      Rate and Rhythm: Normal rate and regular rhythm.   Pulmonary:      Effort: Pulmonary effort is normal.      Breath sounds: Normal breath sounds.   Abdominal:      General: Bowel sounds are normal.      Palpations: Abdomen is soft.      Tenderness: There is no abdominal tenderness. There is no right CVA tenderness or left CVA tenderness.   Musculoskeletal:         General: No tenderness.      Right lower leg: No edema.      Left lower leg: No edema.      Comments: No calf tenderness bilateral.   Lymphadenopathy:      Cervical: No cervical adenopathy.   Skin:     General: Skin is warm.      Findings: No rash.   Neurological:      General: No focal deficit present.      Mental Status: She is alert and oriented to person, place, and time.   Psychiatric:         Mood and Affect: Mood normal.         Behavior: Behavior normal.         Thought Content: Thought content normal.          Santiago Fisher MD

## 2024-11-14 DIAGNOSIS — J31.0 MIXED RHINITIS: ICD-10-CM

## 2024-11-14 RX ORDER — LEVOCETIRIZINE DIHYDROCHLORIDE 5 MG/1
5 TABLET, FILM COATED ORAL EVERY EVENING
Qty: 30 TABLET | Refills: 5 | Status: SHIPPED | OUTPATIENT
Start: 2024-11-14

## 2024-12-11 ENCOUNTER — OFFICE VISIT (OUTPATIENT)
Dept: URGENT CARE | Facility: CLINIC | Age: 74
End: 2024-12-11
Payer: MEDICARE

## 2024-12-11 ENCOUNTER — APPOINTMENT (OUTPATIENT)
Dept: RADIOLOGY | Facility: CLINIC | Age: 74
End: 2024-12-11
Payer: MEDICARE

## 2024-12-11 VITALS
OXYGEN SATURATION: 99 % | SYSTOLIC BLOOD PRESSURE: 132 MMHG | HEART RATE: 97 BPM | TEMPERATURE: 98.2 F | RESPIRATION RATE: 18 BRPM | DIASTOLIC BLOOD PRESSURE: 80 MMHG

## 2024-12-11 DIAGNOSIS — S99.912A INJURY OF LEFT ANKLE, INITIAL ENCOUNTER: ICD-10-CM

## 2024-12-11 DIAGNOSIS — M25.572 ACUTE LEFT ANKLE PAIN: Primary | ICD-10-CM

## 2024-12-11 PROCEDURE — 99213 OFFICE O/P EST LOW 20 MIN: CPT | Performed by: PHYSICIAN ASSISTANT

## 2024-12-11 PROCEDURE — G0463 HOSPITAL OUTPT CLINIC VISIT: HCPCS | Performed by: PHYSICIAN ASSISTANT

## 2024-12-11 PROCEDURE — 73610 X-RAY EXAM OF ANKLE: CPT

## 2024-12-11 NOTE — PROGRESS NOTES
[facility]      NAME: Delmy Curiel is a 74 y.o. female  : 1950    MRN: [id number]  DATE: 2024  TIME: 10:48 AM    Assessment and Plan   Acute left ankle pain [M25.572]  1. Acute left ankle pain        2. Injury of left ankle, initial encounter  XR ankle 3+ vw left          Patient Instructions   Xray appears negative for any fracture, arthritic changes noted. Will follow up with radiologist report when available.  Recommend elevating body part, icing the area every 2 hours for 20-30 minutes, take Ibuprofen every 6-8 hours to reduce inflammation.  Walking boot for comfort. If not improving over the next week, follow up with PCP or orthopedics.    Risks and benefits discussed. Patient understands and agrees with the plan.      If tests have been performed at [facility], our office will contact you with results if changes need to be made to the care plan discussed with you at the visit.  You can review your full results on [facility]'s MyChart.     Follow up with PCP in 3-5 days.      If any of the following occur, please report to your nearest ED for evaluation or call 911.   Difficultly breathing or shortness of breath  Chest pain  Acutely worsening symptoms.   To present to the ER if symptoms worsen.  Chief Complaint     Chief Complaint   Patient presents with    Ankle Injury     Patient slipped last night while walking outside and she fell on her left ankle, she now has pain and swelling in her left ankle and foot.          History of Present Illness   Delmy Curiel presents to the clinic with daughter c/o    Ankle Injury   The incident occurred 12 to 24 hours ago. The incident occurred at home. The injury mechanism was a fall. The pain is present in the left foot and left ankle. The quality of the pain is described as aching. The pain is at a severity of 2/10. The pain is mild. The pain has been Improving since onset. Pertinent negatives include no inability to bear  weight, numbness or tingling. The symptoms are aggravated by weight bearing. She has tried nothing for the symptoms. The treatment provided no relief.       Review of Systems   Review of Systems   Constitutional:  Negative for chills, diaphoresis, fatigue and fever.   HENT:  Negative for congestion, ear discharge, ear pain and facial swelling.    Eyes:  Negative for photophobia, pain, discharge, redness, itching and visual disturbance.   Respiratory:  Negative for apnea, cough, chest tightness, shortness of breath and wheezing.    Cardiovascular:  Negative for chest pain and palpitations.   Gastrointestinal:  Negative for abdominal pain.   Musculoskeletal:  Positive for arthralgias and joint swelling.   Skin:  Negative for color change, rash and wound.   Neurological:  Negative for dizziness, tingling, numbness and headaches.   Hematological:  Negative for adenopathy.         Current Medications     Long-Term Medications   Medication Sig Dispense Refill    acyclovir (ZOVIRAX) 400 MG tablet Take 1 tablet (400 mg total) by mouth 3 (three) times a day 90 tablet 1    azelastine (ASTELIN) 0.1 % nasal spray 1 spray into each nostril 2 (two) times a day (Patient not taking: Reported on 6/5/2024) 1 Bottle 6    fluocinolone acetonide (DermOtic) 0.01 % otic oil Administer 5 drops into both ears 2 (two) times a day 20 mL 0    fluticasone (FLONASE) 50 mcg/act nasal spray 1 spray into each nostril daily 9.9 mL 2    hydrocortisone 2.5 % ointment  (Patient not taking: Reported on 10/4/2024)      ketoconazole (NIZORAL) 2 % cream  (Patient not taking: Reported on 10/4/2024)      levocetirizine (XYZAL) 5 MG tablet TAKE 1 TABLET BY MOUTH EVERY DAY IN THE EVENING 30 tablet 5    Multiple Vitamin (multivitamin) capsule Take 1 capsule by mouth daily         Current Allergies     Allergies as of 12/11/2024 - Reviewed 12/11/2024   Allergen Reaction Noted    Cat hair extract Other (See Comments) 10/04/2024    Dust mite extract Other (See  Comments) 10/04/2024            The following portions of the patient's history were reviewed and updated as appropriate: allergies, current medications, past family history, past medical history, past social history, past surgical history and problem list.  Past Medical History:   Diagnosis Date    Diverticulosis 2021    Long term use of drug     RESOLVED: 17OCT2014     History reviewed. No pertinent surgical history.  Social History     Socioeconomic History    Marital status:      Spouse name: Not on file    Number of children: Not on file    Years of education: Not on file    Highest education level: Not on file   Occupational History    Not on file   Tobacco Use    Smoking status: Never     Passive exposure: Current    Smokeless tobacco: Never   Vaping Use    Vaping status: Never Used   Substance and Sexual Activity    Alcohol use: Yes     Comment: SOCIAL     Drug use: Not Currently     Comment: FORMER RECREATIONAL DRUG USER     Sexual activity: Not Currently   Other Topics Concern    Not on file   Social History Narrative    Not on file     Social Drivers of Health     Financial Resource Strain: Low Risk  (12/1/2022)    Overall Financial Resource Strain (CARDIA)     Difficulty of Paying Living Expenses: Not hard at all   Food Insecurity: No Food Insecurity (7/8/2024)    Nursing - Inadequate Food Risk Classification     Worried About Running Out of Food in the Last Year: Never true     Ran Out of Food in the Last Year: Never true     Ran Out of Food in the Last Year: Not on file   Transportation Needs: Unknown (7/8/2024)    PRAPARE - Transportation     Lack of Transportation (Medical): No     Lack of Transportation (Non-Medical): Not on file   Physical Activity: Not on file   Stress: Not on file   Social Connections: Not on file   Intimate Partner Violence: Not on file   Housing Stability: Unknown (7/8/2024)    Housing Stability Vital Sign     Unable to Pay for Housing in the Last Year: No     Number of  Times Moved in the Last Year: Not on file     Homeless in the Last Year: No       Objective   /80   Pulse 97   Temp 98.2 °F (36.8 °C)   Resp 18   SpO2 99%      Physical Exam     Physical Exam  Vitals and nursing note reviewed.   Constitutional:       General: She is not in acute distress.     Appearance: She is well-developed. She is not diaphoretic.   HENT:      Head: Normocephalic and atraumatic.      Right Ear: Tympanic membrane and external ear normal.      Left Ear: Tympanic membrane and external ear normal.      Nose: Nose normal.      Mouth/Throat:      Mouth: Mucous membranes are moist.      Pharynx: No oropharyngeal exudate or posterior oropharyngeal erythema.   Eyes:      General: No scleral icterus.        Right eye: No discharge.         Left eye: No discharge.      Conjunctiva/sclera: Conjunctivae normal.   Cardiovascular:      Rate and Rhythm: Normal rate and regular rhythm.      Heart sounds: Normal heart sounds. No murmur heard.     No friction rub. No gallop.   Pulmonary:      Effort: Pulmonary effort is normal. No respiratory distress.      Breath sounds: Normal breath sounds. No decreased breath sounds, wheezing, rhonchi or rales.   Musculoskeletal:      Left ankle: Swelling present. No ecchymosis. Tenderness present over the lateral malleolus. No medial malleolus tenderness. Normal range of motion. Anterior drawer test negative. Normal pulse.   Skin:     General: Skin is warm and dry.      Coloration: Skin is not pale.      Findings: No erythema or rash.   Neurological:      Mental Status: She is alert and oriented to person, place, and time.   Psychiatric:         Behavior: Behavior normal.         Thought Content: Thought content normal.         Judgment: Judgment normal.         Glory Jesus PA-C

## 2024-12-31 ENCOUNTER — RA CDI HCC (OUTPATIENT)
Dept: OTHER | Facility: HOSPITAL | Age: 74
End: 2024-12-31

## 2025-02-15 ENCOUNTER — APPOINTMENT (EMERGENCY)
Dept: RADIOLOGY | Facility: HOSPITAL | Age: 75
DRG: 493 | End: 2025-02-15
Payer: MEDICARE

## 2025-02-15 ENCOUNTER — HOSPITAL ENCOUNTER (INPATIENT)
Facility: HOSPITAL | Age: 75
LOS: 4 days | Discharge: NURSING, REHAB OR LONG-TERM CARE | DRG: 493 | End: 2025-02-20
Admitting: SURGERY
Payer: MEDICARE

## 2025-02-15 ENCOUNTER — APPOINTMENT (EMERGENCY)
Dept: CT IMAGING | Facility: HOSPITAL | Age: 75
DRG: 493 | End: 2025-02-15
Payer: MEDICARE
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
## 2025-02-15 DIAGNOSIS — S82.245A NONDISPLACED SPIRAL FRACTURE OF SHAFT OF LEFT TIBIA, INITIAL ENCOUNTER FOR CLOSED FRACTURE: ICD-10-CM

## 2025-02-15 DIAGNOSIS — S82.392A OTHER CLOSED FRACTURE OF DISTAL END OF LEFT TIBIA, INITIAL ENCOUNTER: Primary | ICD-10-CM

## 2025-02-15 DIAGNOSIS — S82.832A OTHER CLOSED FRACTURE OF PROXIMAL END OF LEFT FIBULA, INITIAL ENCOUNTER: ICD-10-CM

## 2025-02-15 DIAGNOSIS — S82.832A OTHER CLOSED FRACTURE OF DISTAL END OF LEFT FIBULA, INITIAL ENCOUNTER: ICD-10-CM

## 2025-02-15 LAB
ABO GROUP BLD: NORMAL
ABO GROUP BLD: NORMAL
ALBUMIN SERPL BCG-MCNC: 4.3 G/DL (ref 3.5–5)
ALP SERPL-CCNC: 93 U/L (ref 34–104)
ALT SERPL W P-5'-P-CCNC: 14 U/L (ref 7–52)
ANION GAP SERPL CALCULATED.3IONS-SCNC: 9 MMOL/L (ref 4–13)
APTT PPP: 27 SECONDS (ref 23–34)
AST SERPL W P-5'-P-CCNC: 16 U/L (ref 13–39)
BASOPHILS # BLD AUTO: 0.08 THOUSANDS/ΜL (ref 0–0.1)
BASOPHILS NFR BLD AUTO: 1 % (ref 0–1)
BILIRUB SERPL-MCNC: 0.61 MG/DL (ref 0.2–1)
BLD GP AB SCN SERPL QL: NEGATIVE
BUN SERPL-MCNC: 16 MG/DL (ref 5–25)
CALCIUM SERPL-MCNC: 9.2 MG/DL (ref 8.4–10.2)
CHLORIDE SERPL-SCNC: 103 MMOL/L (ref 96–108)
CO2 SERPL-SCNC: 23 MMOL/L (ref 21–32)
CREAT SERPL-MCNC: 0.74 MG/DL (ref 0.6–1.3)
EOSINOPHIL # BLD AUTO: 0.02 THOUSAND/ΜL (ref 0–0.61)
EOSINOPHIL NFR BLD AUTO: 0 % (ref 0–6)
ERYTHROCYTE [DISTWIDTH] IN BLOOD BY AUTOMATED COUNT: 14.3 % (ref 11.6–15.1)
GFR SERPL CREATININE-BSD FRML MDRD: 80 ML/MIN/1.73SQ M
GLUCOSE SERPL-MCNC: 104 MG/DL (ref 65–140)
HCT VFR BLD AUTO: 43.5 % (ref 34.8–46.1)
HGB BLD-MCNC: 14 G/DL (ref 11.5–15.4)
IMM GRANULOCYTES # BLD AUTO: 0.08 THOUSAND/UL (ref 0–0.2)
IMM GRANULOCYTES NFR BLD AUTO: 1 % (ref 0–2)
INR PPP: 0.88 (ref 0.85–1.19)
LYMPHOCYTES # BLD AUTO: 0.68 THOUSANDS/ΜL (ref 0.6–4.47)
LYMPHOCYTES NFR BLD AUTO: 5 % (ref 14–44)
MCH RBC QN AUTO: 27.6 PG (ref 26.8–34.3)
MCHC RBC AUTO-ENTMCNC: 32.2 G/DL (ref 31.4–37.4)
MCV RBC AUTO: 86 FL (ref 82–98)
MONOCYTES # BLD AUTO: 0.6 THOUSAND/ΜL (ref 0.17–1.22)
MONOCYTES NFR BLD AUTO: 4 % (ref 4–12)
NEUTROPHILS # BLD AUTO: 12.94 THOUSANDS/ΜL (ref 1.85–7.62)
NEUTS SEG NFR BLD AUTO: 89 % (ref 43–75)
NRBC BLD AUTO-RTO: 0 /100 WBCS
PLATELET # BLD AUTO: 250 THOUSANDS/UL (ref 149–390)
PMV BLD AUTO: 9.8 FL (ref 8.9–12.7)
POTASSIUM SERPL-SCNC: 3.8 MMOL/L (ref 3.5–5.3)
PROT SERPL-MCNC: 6.9 G/DL (ref 6.4–8.4)
PROTHROMBIN TIME: 12.7 SECONDS (ref 12.3–15)
RBC # BLD AUTO: 5.08 MILLION/UL (ref 3.81–5.12)
RH BLD: NEGATIVE
RH BLD: NEGATIVE
SODIUM SERPL-SCNC: 135 MMOL/L (ref 135–147)
SPECIMEN EXPIRATION DATE: NORMAL
WBC # BLD AUTO: 14.4 THOUSAND/UL (ref 4.31–10.16)

## 2025-02-15 PROCEDURE — 85730 THROMBOPLASTIN TIME PARTIAL: CPT | Performed by: PHYSICIAN ASSISTANT

## 2025-02-15 PROCEDURE — 29505 APPLICATION LONG LEG SPLINT: CPT

## 2025-02-15 PROCEDURE — 85610 PROTHROMBIN TIME: CPT | Performed by: PHYSICIAN ASSISTANT

## 2025-02-15 PROCEDURE — 36415 COLL VENOUS BLD VENIPUNCTURE: CPT | Performed by: PHYSICIAN ASSISTANT

## 2025-02-15 PROCEDURE — 73610 X-RAY EXAM OF ANKLE: CPT

## 2025-02-15 PROCEDURE — 86900 BLOOD TYPING SEROLOGIC ABO: CPT | Performed by: PHYSICIAN ASSISTANT

## 2025-02-15 PROCEDURE — 86901 BLOOD TYPING SEROLOGIC RH(D): CPT | Performed by: PHYSICIAN ASSISTANT

## 2025-02-15 PROCEDURE — 71045 X-RAY EXAM CHEST 1 VIEW: CPT

## 2025-02-15 PROCEDURE — 86850 RBC ANTIBODY SCREEN: CPT | Performed by: PHYSICIAN ASSISTANT

## 2025-02-15 PROCEDURE — 99285 EMERGENCY DEPT VISIT HI MDM: CPT

## 2025-02-15 PROCEDURE — 73590 X-RAY EXAM OF LOWER LEG: CPT

## 2025-02-15 PROCEDURE — 96372 THER/PROPH/DIAG INJ SC/IM: CPT

## 2025-02-15 PROCEDURE — 85025 COMPLETE CBC W/AUTO DIFF WBC: CPT | Performed by: PHYSICIAN ASSISTANT

## 2025-02-15 PROCEDURE — 80053 COMPREHEN METABOLIC PANEL: CPT | Performed by: PHYSICIAN ASSISTANT

## 2025-02-15 PROCEDURE — 73700 CT LOWER EXTREMITY W/O DYE: CPT

## 2025-02-15 RX ORDER — KETOROLAC TROMETHAMINE 30 MG/ML
15 INJECTION, SOLUTION INTRAMUSCULAR; INTRAVENOUS ONCE
Status: COMPLETED | OUTPATIENT
Start: 2025-02-15 | End: 2025-02-15

## 2025-02-15 RX ADMIN — KETOROLAC TROMETHAMINE 15 MG: 30 INJECTION, SOLUTION INTRAMUSCULAR; INTRAVENOUS at 20:26
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
## 2025-02-16 LAB
ALBUMIN SERPL BCG-MCNC: 3.7 G/DL (ref 3.5–5)
ALP SERPL-CCNC: 82 U/L (ref 34–104)
ALT SERPL W P-5'-P-CCNC: 12 U/L (ref 7–52)
ANION GAP SERPL CALCULATED.3IONS-SCNC: 6 MMOL/L (ref 4–13)
AST SERPL W P-5'-P-CCNC: 14 U/L (ref 13–39)
BASOPHILS # BLD AUTO: 0.07 THOUSANDS/ΜL (ref 0–0.1)
BASOPHILS NFR BLD AUTO: 1 % (ref 0–1)
BILIRUB SERPL-MCNC: 0.67 MG/DL (ref 0.2–1)
BUN SERPL-MCNC: 17 MG/DL (ref 5–25)
CALCIUM SERPL-MCNC: 8.7 MG/DL (ref 8.4–10.2)
CHLORIDE SERPL-SCNC: 107 MMOL/L (ref 96–108)
CO2 SERPL-SCNC: 23 MMOL/L (ref 21–32)
CREAT SERPL-MCNC: 0.9 MG/DL (ref 0.6–1.3)
EOSINOPHIL # BLD AUTO: 0.03 THOUSAND/ΜL (ref 0–0.61)
EOSINOPHIL NFR BLD AUTO: 0 % (ref 0–6)
ERYTHROCYTE [DISTWIDTH] IN BLOOD BY AUTOMATED COUNT: 14.3 % (ref 11.6–15.1)
GFR SERPL CREATININE-BSD FRML MDRD: 63 ML/MIN/1.73SQ M
GLUCOSE SERPL-MCNC: 107 MG/DL (ref 65–140)
HCT VFR BLD AUTO: 39 % (ref 34.8–46.1)
HGB BLD-MCNC: 12.7 G/DL (ref 11.5–15.4)
IMM GRANULOCYTES # BLD AUTO: 0.03 THOUSAND/UL (ref 0–0.2)
IMM GRANULOCYTES NFR BLD AUTO: 0 % (ref 0–2)
LYMPHOCYTES # BLD AUTO: 0.8 THOUSANDS/ΜL (ref 0.6–4.47)
LYMPHOCYTES NFR BLD AUTO: 8 % (ref 14–44)
MCH RBC QN AUTO: 27.7 PG (ref 26.8–34.3)
MCHC RBC AUTO-ENTMCNC: 32.6 G/DL (ref 31.4–37.4)
MCV RBC AUTO: 85 FL (ref 82–98)
MONOCYTES # BLD AUTO: 0.81 THOUSAND/ΜL (ref 0.17–1.22)
MONOCYTES NFR BLD AUTO: 8 % (ref 4–12)
NEUTROPHILS # BLD AUTO: 8.08 THOUSANDS/ΜL (ref 1.85–7.62)
NEUTS SEG NFR BLD AUTO: 83 % (ref 43–75)
NRBC BLD AUTO-RTO: 0 /100 WBCS
PLATELET # BLD AUTO: 225 THOUSANDS/UL (ref 149–390)
PMV BLD AUTO: 10 FL (ref 8.9–12.7)
POTASSIUM SERPL-SCNC: 3.8 MMOL/L (ref 3.5–5.3)
PROT SERPL-MCNC: 5.9 G/DL (ref 6.4–8.4)
RBC # BLD AUTO: 4.58 MILLION/UL (ref 3.81–5.12)
SODIUM SERPL-SCNC: 136 MMOL/L (ref 135–147)
WBC # BLD AUTO: 9.82 THOUSAND/UL (ref 4.31–10.16)

## 2025-02-16 PROCEDURE — 85025 COMPLETE CBC W/AUTO DIFF WBC: CPT

## 2025-02-16 PROCEDURE — 36415 COLL VENOUS BLD VENIPUNCTURE: CPT

## 2025-02-16 PROCEDURE — 97116 GAIT TRAINING THERAPY: CPT

## 2025-02-16 PROCEDURE — 97163 PT EVAL HIGH COMPLEX 45 MIN: CPT

## 2025-02-16 PROCEDURE — 80053 COMPREHEN METABOLIC PANEL: CPT

## 2025-02-16 PROCEDURE — 99223 1ST HOSP IP/OBS HIGH 75: CPT | Performed by: ORTHOPAEDIC SURGERY

## 2025-02-16 PROCEDURE — 99223 1ST HOSP IP/OBS HIGH 75: CPT | Performed by: SURGERY

## 2025-02-16 RX ORDER — ONDANSETRON 2 MG/ML
4 INJECTION INTRAMUSCULAR; INTRAVENOUS EVERY 6 HOURS PRN
Status: DISCONTINUED | OUTPATIENT
Start: 2025-02-16 | End: 2025-02-20 | Stop reason: HOSPADM

## 2025-02-16 RX ORDER — OXYCODONE HYDROCHLORIDE 5 MG/1
5 TABLET ORAL EVERY 4 HOURS PRN
Refills: 0 | Status: DISCONTINUED | OUTPATIENT
Start: 2025-02-16 | End: 2025-02-20 | Stop reason: HOSPADM

## 2025-02-16 RX ORDER — ENOXAPARIN SODIUM 100 MG/ML
30 INJECTION SUBCUTANEOUS EVERY 12 HOURS
Status: DISCONTINUED | OUTPATIENT
Start: 2025-02-16 | End: 2025-02-20 | Stop reason: HOSPADM

## 2025-02-16 RX ORDER — AMOXICILLIN 250 MG
1 CAPSULE ORAL
Status: DISCONTINUED | OUTPATIENT
Start: 2025-02-16 | End: 2025-02-20 | Stop reason: HOSPADM

## 2025-02-16 RX ORDER — HYDROMORPHONE HCL IN WATER/PF 6 MG/30 ML
0.2 PATIENT CONTROLLED ANALGESIA SYRINGE INTRAVENOUS EVERY 2 HOUR PRN
Refills: 0 | Status: DISCONTINUED | OUTPATIENT
Start: 2025-02-16 | End: 2025-02-19

## 2025-02-16 RX ORDER — ACETAMINOPHEN 325 MG/1
975 TABLET ORAL EVERY 8 HOURS SCHEDULED
Status: DISCONTINUED | OUTPATIENT
Start: 2025-02-16 | End: 2025-02-20 | Stop reason: HOSPADM

## 2025-02-16 RX ORDER — POLYETHYLENE GLYCOL 3350 17 G/17G
17 POWDER, FOR SOLUTION ORAL DAILY
Status: DISCONTINUED | OUTPATIENT
Start: 2025-02-16 | End: 2025-02-20 | Stop reason: HOSPADM

## 2025-02-16 RX ADMIN — Medication 1 TABLET: at 08:59

## 2025-02-16 RX ADMIN — OXYCODONE HYDROCHLORIDE 5 MG: 5 TABLET ORAL at 19:52

## 2025-02-16 RX ADMIN — ACETAMINOPHEN 975 MG: 325 TABLET, FILM COATED ORAL at 02:51

## 2025-02-16 RX ADMIN — Medication 2000 UNITS: at 08:59

## 2025-02-16 RX ADMIN — ACETAMINOPHEN 975 MG: 325 TABLET, FILM COATED ORAL at 22:27

## 2025-02-16 RX ADMIN — SENNOSIDES AND DOCUSATE SODIUM 1 TABLET: 50; 8.6 TABLET ORAL at 02:52

## 2025-02-16 RX ADMIN — ACETAMINOPHEN 975 MG: 325 TABLET, FILM COATED ORAL at 07:32

## 2025-02-16 RX ADMIN — ENOXAPARIN SODIUM 30 MG: 30 INJECTION SUBCUTANEOUS at 14:38

## 2025-02-16 RX ADMIN — SENNOSIDES AND DOCUSATE SODIUM 1 TABLET: 50; 8.6 TABLET ORAL at 22:27

## 2025-02-16 RX ADMIN — ACETAMINOPHEN 975 MG: 325 TABLET, FILM COATED ORAL at 14:37

## 2025-02-16 RX ADMIN — ENOXAPARIN SODIUM 30 MG: 30 INJECTION SUBCUTANEOUS at 02:52

## 2025-02-16 NOTE — CONSULTS
Consultation - Orthopedics   Name: Delmy Curiel 74 y.o. female I MRN: [id number]  Unit/Bed#: ED-41 I Date of Admission: 2/15/2025   Date of Service: 2/16/2025 I Hospital Day: 0   Inpatient consult to Orthopedic Surgery  Consult performed by: Trish Melendez PA-C  Consult ordered by: Camron Renteria MD        Physician Requesting Evaluation: Clint Tian DO   Reason for Evaluation / Principal Problem: Left tibia and fibula fracture    Assessment & Plan  Nondisplaced spiral fracture of shaft of left tibia, initial encounter for closed fracture  Comminuted minimally displaced distal tibia fracture, minimally proximal and distal fibula fracture  Patient presents to the ED s/p fall on ice, here with left lower extremity pain  CT left lower extremity shows oblique fracture of the medial tibial metadiaphysis extending towards distal tibia fibula syndesmosis into lateral tibial plafond with intra-articular extension. Minimally displaced fracture at the distal posterior fibula, oblique fracture through proximal fibular diaphysis with slight anterior displacement  X-ray of left ankle and tibia/fibula shows oblique spiral fracture to the proximal fibula with slight anterior displacement, comminuted minimally displaced distal tibia fracture, minimally displaced distal fibula fracture with maintained syndesmosis.  Continue splint  NWB LLE  Pain control  DVT ppx per primary  Possible surgical fixation during admission and will discuss with staffing, if no surgical intervention she should follow up as outpatient  Patient to follow up with Orthopedic surgery within 1 week with Dr. Ayon if no surgical intervention inpatient  Ortho will follow           History of Present Illness   HPI: Delmy Curiel is a 74 y.o. year old female who presents to the ED after slipping and falling in her drive way last evening, states that she landed on her left side, notes that she has left leg pain. She notes overall that she was  able to sleep last night. She states that she does not have any numbness or tingling and denies pain elsewhere.     Review of Systems significant for findings described in the HPI.  Historical Information   Past Medical History:   Diagnosis Date    Diverticulosis 2021    Long term use of drug     RESOLVED: 17OCT2014     History reviewed. No pertinent surgical history.  Social History     Tobacco Use    Smoking status: Never     Passive exposure: Current    Smokeless tobacco: Never   Vaping Use    Vaping status: Never Used   Substance and Sexual Activity    Alcohol use: Yes     Comment: SOCIAL     Drug use: Not Currently     Comment: FORMER RECREATIONAL DRUG USER     Sexual activity: Not Currently     E-Cigarette/Vaping    E-Cigarette Use Never User      E-Cigarette/Vaping Substances    Nicotine No     THC No     CBD No     Flavoring No     Other No     Unknown No          Objective :  Temp:  [97.9 °F (36.6 °C)] 97.9 °F (36.6 °C)  HR:  [76-86] 76  BP: (115-175)/(57-76) 115/57  Resp:  [16-18] 16  SpO2:  [95 %-99 %] 95 %  O2 Device: None (Room air)  Physical ExamOrtho Exam   Musculoskeletal: Left lower extremity  Skin intact surrounding splint . No erythema or ecchymosis.  Patient in splint with leg propped up with pillow  Anterior calf and knee swelling   TTP anterior over proximal fibula  Unable to palpate ankle  Moderate swelling of the calf   Sensation intact to saphenous, sural, tibial, superficial peroneal nerve, and deep peroneal  2+ DP pulse  Thigh and calf soft and compressible  Capillary refill < 2 sec  Able to wiggle toes    Tertiary exam:    Right hip, thigh, knee, ankle and foot are NTTP, motor and sensation intact throughout. 2+Dp pulse    Bilateral clavicles without step-off, bilateral shoulders, elbows, forearm, wrist and hands are NTTP, motor and sensation intact throughout, 2+ bilateral radial pulses.      Lab Results: I have reviewed the following results:   Recent Labs     02/15/25  2202   WBC  "14.40*   HGB 14.0   HCT 43.5      BUN 16   CREATININE 0.74   PTT 27   INR 0.88     Blood Culture: No results found for: \"BLOODCX\"  Wound Culture: No results found for: \"WOUNDCULT\"    Imaging Results Review: I reviewed radiology reports from this admission including: CT abdomen/pelvis and xray(s).    "

## 2025-02-16 NOTE — ASSESSMENT & PLAN NOTE
Comminuted minimally displaced distal tibia fracture, minimally proximal and distal fibula fracture  Patient presents to the ED s/p fall on ice, here with left lower extremity pain  CT left lower extremity shows oblique fracture of the medial tibial metadiaphysis extending towards distal tibia fibula syndesmosis into lateral tibial plafond with intra-articular extension. Minimally displaced fracture at the distal posterior fibula, oblique fracture through proximal fibular diaphysis with slight anterior displacement  X-ray of left ankle and tibia/fibula shows oblique spiral fracture to the proximal fibula with slight anterior displacement, comminuted minimally displaced distal tibia fracture, minimally displaced distal fibula fracture with maintained syndesmosis.  Continue splint  NWB LLE  Pain control  DVT ppx per primary  Possible surgical fixation during admission and will discuss with staffing, if no surgical intervention she should follow up as outpatient  Patient to follow up with Orthopedic surgery within 1 week with Dr. Ayon if no surgical intervention inpatient  Ortho will follow

## 2025-02-16 NOTE — PHYSICAL THERAPY NOTE
PHYSICAL THERAPY EVALUATION  NAME:  Delmy Candelario Check  DATE: 02/16/25    AGE:   74 y.o.  Mrn:   [id number]  Principal problem: Principal Problem:    Nondisplaced spiral fracture of shaft of left tibia, initial encounter for closed fracture  Active Problems:    Fall      Vitals:    02/16/25 0800 02/16/25 1000 02/16/25 1200 02/16/25 1230   BP: 121/58 119/59 (!) 163/115 133/69   Pulse: 80 88 92 81   Resp: 16  16    Temp:       TempSrc:       SpO2: 98% 97% 98% 97%   Weight:       Height:           Length Of Stay: 0  Performed at least 2 patient identifiers during session: Name, Birthday, and Epic photo  PHYSICAL THERAPY EVALUATION :    02/16/25 1200   PT Last Visit   PT Visit Date 02/16/25   Note Type   Note type Evaluation   Pain Assessment   Pain Assessment Tool 0-10   Pain Score 3   Pain Location/Orientation Orientation: Left;Location: Leg   Effect of Pain on Daily Activities limits speed and indep of mobility, but no substantial pain when leg in Dependent position   Patient's Stated Pain Goal No pain   Hospital Pain Intervention(s) Repositioned;Ambulation/increased activity;Emotional support   Restrictions/Precautions   Weight Bearing Precautions Per Order Yes   LLE Weight Bearing Per Order NWB  (L posterior leg splint into extension)   Home Living   Type of Home House   Home Layout   (no CRYSTAL, but large hill to enter (earlier that 1900 home))   Home Equipment   (did use crutches in past, no DME prior to this admission)   Additional Comments planning to go to daughter--one floor, but hill to enter   Prior Function   Level of Equality Independent with ADLs;Independent with functional mobility   Lives With Alone   Receives Help From Family  (daughter lives closeby)   IADLs Independent with driving;Independent with meal prep;Independent with medication management   Falls in the last 6 months 1 to 4  (2 falls on ice day of admission)   Vocational Retired  (teacher)   General   Additional Pertinent History  "Upon talking to nursing, staff reports she is eager to attempt getting OOB, wishes to try while maintaining NWB LLE, therefore pt seen for IPPT   Family/Caregiver Present No   Cognition   Overall Cognitive Status WFL   Arousal/Participation Alert   Following Commands Follows one step commands with increased time or repetition   Subjective   Subjective Pt pleasant and cooperative, reports having some lightheadedness when attempting to drive s/p fall, none during session   RUE Assessment   RUE Assessment WFL   LUE Assessment   LUE Assessment WFL   Strength RLE   R Hip Flexion 4-/5   R Knee Extension 4/5   R Ankle Dorsiflexion 4/5   Strength LLE   L Hip Flexion   (<3 SLR)   L Ankle Dorsiflexion   (NA, but pt able to flex/ext toes and warm to touch)   Light Touch   LLE Light Touch   (@ L exposed toes)   Bed Mobility   Supine to Sit 4  Minimal assistance   Additional items Assist x 1;Bedrails;Increased time required;Verbal cues;LE management   Sit to Supine   (NT)   Transfers   Sit to Stand 4  Minimal assistance   Additional items Assist x 1;Increased time required;Verbal cues;Armrests   Stand to Sit 4  Minimal assistance  (steayding support only)   Additional items Assist x 1;Increased time required;Verbal cues;Armrests   Ambulation/Elevation   Gait pattern Antalgic;Step to;Excessively slow  (maintaining NWB on LE, however \"hopping vertically\" not swing through.)   Gait Assistance 4  Minimal assist   Additional items Assist x 1;Verbal cues;Tactile cues   Assistive Device Rolling walker   Distance 3' bed to chair.   Stair Management Assistance Not tested   Balance   Static Sitting Good   Static Standing Poor +   Ambulatory Poor +   Endurance Deficit   Endurance Deficit Yes   Endurance Deficit Description limited standing tolerance and amb distances   Activity Tolerance   Activity Tolerance Patient limited by fatigue;Patient limited by pain   Nurse Made Aware spoke to RN and PCA   Assessment:   Pt is a 74 y.o. female who " "arrived at [facility] on 2/15/2025 w/ Nondisplaced spiral fracture of shaft of left tibia, initial encounter for closed fracture.  Ortho definitive plan pending for OR, but pt is NWB LLE in posterior splint. Order placed for PT.      Prior to admission: Pt lived alone in an \"old barn house\" , primarily one floor accommodations, but large hill to enter.  She anticipates potentially discharging to her daughter's home which has steps to enter plus railing, but then would be on 1 floor.  Upon evaluation: Pt needed min assist for bed mobility, transfers, and short distance ambulation to the target surface of a recliner.    Pt's clinical presentation is currently unstable/unpredictable given the functional mobility deficits above, especially (but not limited to) gait deviations, pain, and orthopedic restrictions, and combined with medical complications including hypertension , fear/retreat, and awaiting definitive inpt ortho plan.  Pt IS NOT at her mobility baseline.        During this admission, pt would benefit from continued skilled inpatient PT in the acute care setting in order to address deficits as defined above to maximize function and mobility.      Recommendations:    From a PT standpoint, recommend next several sessions focus on continued mobility training with rolling walker while maintaining orthopedic weightbearing restrictions, potentially transitioning to bilateral crutches.    The patient is discharged to her daughter's home, would recommend patient performing stairs with 1 rail and 1 crutch, will likely need at least supervision for stairs performance from family.     Prognosis Fair   Problem List Decreased strength;Decreased range of motion;Decreased endurance;Impaired balance;Decreased mobility;Pain;Orthopedic restrictions;Decreased coordination  (gait deviations)   Barriers to Discharge Decreased caregiver support;Inaccessible home environment   Goals   Patient Goals to go home to her " daughter's home   STG Expiration Date 02/26/25   Short Term Goal #1 Goals: Pt will: Perform rolling  and supine<>sit bed mobility tasks with modified I to prepare for transfers and reposition in bed. Perform transfers with modified I to demonstrate compliance with WB limitations and promote proper hand placement and approach. Perform ambulation with LRAD for at least 50' with Supervision to demonstrate compliance with WB limitations and promote proper use of assistive device. Perform at least 4 stairs w/ railing +/- DME and w/no more than min A to return to home with CRYSTAL.   PT Treatment Day 1   Plan   Treatment/Interventions Functional transfer training;Elevations;LE strengthening/ROM;Therapeutic exercise;Endurance training;Cognitive reorientation;Patient/family training;Equipment eval/education;Gait training;Bed mobility;Spoke to nursing   PT Frequency 3-5x/wk   Discharge Recommendation   Rehab Resource Intensity Level, PT II (Moderate Resource Intensity)  (with potential progress to level 3 pending definitive OR or not)   Equipment Recommended Walker   Walker Package Recommended Wheeled walker   AM-PAC Basic Mobility Inpatient   Turning in Flat Bed Without Bedrails 3   Lying on Back to Sitting on Edge of Flat Bed Without Bedrails 3   Moving Bed to Chair 3   Standing Up From Chair Using Arms 3   Walk in Room 3   Climb 3-5 Stairs With Railing 1   Basic Mobility Inpatient Raw Score 16   Basic Mobility Standardized Score 38.32   [facility] Highest Level Of Mobility   -HLM Goal 5: Stand one or more mins   -HLM Achieved 6: Walk 10 steps or more   Additional Treatment Session   Start Time 1215   End Time 1230   Treatment Assessment After demonstration and multiple trials w/ varying height adjust ment of walker, pt was able to amb in her ED room and into the hallway with only steadying A and intermittent verbal instruction. Recommend contininued PT to progress pt toward more indep LOF w/ LRAD. I anticipate walker  "for level surfaces and crutches +rail for stairs upon DC   Equipment Use rolling walker   Additional Treatment Day 1   Exercises   Neuro re-ed Provided education and demonstration for performing NWB w/rolling walker. First trial w/min A for 5', second trial for 20' w/min A /steadying A. Pt needed verbal instruction for hand placement and completion of approach w/ transfers. Walker height adjusted tolowest setting for improved UE support. Recommend contininued PT to progress pt toward more indep LOF w/ LRAD. I anticipate walker for level surfaces and crutches +rail for stairs upon DC   End of Consult   Patient Position at End of Consult All needs within reach;Bedside chair  (Pt reports she will call for assist- RN and PCA aware)   (Please find full objective findings from PT assessment regarding body systems outlined above).     The patient's -[facility] Basic Mobility Inpatient Short Form Raw Score is 16. A Raw score of less than or equal to 16 suggests the patient may benefit from discharge to post-acute rehabilitation services, which MAY NOT coincide with EVENTUAL DC PT recommendations, based on pt's mobility progress and pain control.     However please refer to therapist recommendation for discharge planning given other factors that may influence destination.     Adapted from Osvaldo BAPTISTE, Myra J, Elie J, Ondina J. [facility] “6-Clicks” Basic Mobility and Daily Activity Scores With Discharge Destination. Physical Therapy, 2021;101:1-9. DOI: [email or web address]    Portions of the record may have been created with voice recognition software.  Occasional wrong word or \"sound a like\" substitutions may have occurred due to the inherent limitations of voice recognition software.  Read the chart carefully and recognize, using context, where substitutions have occurred    "

## 2025-02-16 NOTE — PLAN OF CARE
"  Problem: PHYSICAL THERAPY ADULT  Goal: Performs mobility at highest level of function for planned discharge setting.  See evaluation for individualized goals.  Description: Treatment/Interventions: Functional transfer training, Elevations, LE strengthening/ROM, Therapeutic exercise, Endurance training, Cognitive reorientation, Patient/family training, Equipment eval/education, Gait training, Bed mobility, Spoke to nursing  Equipment Recommended: Walker       See flowsheet documentation for full assessment, interventions and recommendations.  Note: Prognosis: Fair  Problem List: Decreased strength, Decreased range of motion, Decreased endurance, Impaired balance, Decreased mobility, Pain, Orthopedic restrictions, Decreased coordination (gait deviations)  Assessment: Pt is a 74 y.o. female who arrived at [facility] on 2/15/2025 w/ Nondisplaced spiral fracture of shaft of left tibia, initial encounter for closed fracture.  Ortho definitive plan pending for OR, but pt is NWB LLE in posterior splint. Order placed for PT.      Prior to admission: Pt lived alone in an \"old barn house\" , primarily one floor accommodations, but large hill to enter.  She anticipates potentially discharging to her daughter's home which has steps to enter plus railing, but then would be on 1 floor.  Upon evaluation: Pt needed min assist for bed mobility, transfers, and short distance ambulation to the target surface of a recliner.    Pt's clinical presentation is currently unstable/unpredictable given the functional mobility deficits above, especially (but not limited to) gait deviations, pain, and orthopedic restrictions, and combined with medical complications including hypertension , fear/retreat, and awaiting definitive inpt ortho plan .  Pt IS NOT at her mobility baseline.        During this admission, pt would benefit from continued skilled inpatient PT in the acute care setting in order to address deficits as defined above to " maximize function and mobility.      Recommendations:     From a PT standpoint, recommend next several sessions focus on continued mobility training with rolling walker while maintaining orthopedic weightbearing restrictions, potentially transitioning to bilateral crutches.     The patient is discharged to her daughter's home, would recommend patient performing stairs with 1 rail and 1 crutch, will likely need at least supervision for stairs performance from family.  Barriers to Discharge: Decreased caregiver support, Inaccessible home environment     Rehab Resource Intensity Level, PT: II (Moderate Resource Intensity) (with potential progress to level 3 pending definitive OR or not)    See flowsheet documentation for full assessment.

## 2025-02-16 NOTE — ASSESSMENT & PLAN NOTE
H&P    59 y/o F with no PMH p/w fever and cough since Thursday. Tmax 103. She presented to urgent care initially and was treated with tamiflu for presumed flu. Despite this she continued to have fevers. Pt denies recent travel, sick contacts or any other sx or acute complaints. Endorses pinching pain in chest when she coughs. Also with nausea and poor PO intake. Denies shortness of breath, abdominal pain, dysuria or LE edema. Patient was then found to have COVID-19 PNA.     Internal Events: Patient had progressive oxygenation requirements maxing on NC and eventually placed on NRB. Pulmonology was then consulted given her increasing O2 requirements. The original assessment at that time was that the patient would require intubation. However, joint decision making with patient and family   led to the patient deferring intubation with the understanding of the risks of delayed intubation.     A/P:      Pulm  No active issues    Cardiac  No active issues    GI  No active issues    Neuro  No active issues    Renal  No active issues    Endo  No active issues    Heme  No active issues    PPx: HSQ; Protonix;   FEN: none; replete electrolytes PRN; NPO  Code status: Full      Dispo: c/w care on ICU  Frances:  Access/Lines: Patient presents to the emergency department after falling while shoveling her walkway.  Patient states that she had slipped on ice causing her to fall when she felt a snap in her left lower extremity.  Patient was unable to ambulate following the incident.  Denies head strike, LOC, AC/AP therapy at this time.   H&P    61 y/o F with no PMH p/w fever and cough since Thursday. Tmax 103. She presented to urgent care initially and was treated with tamiflu for presumed flu. Despite this she continued to have fevers. Pt denies recent travel, sick contacts or any other sx or acute complaints. Endorses pinching pain in chest when she coughs. Also with nausea and poor PO intake. Denies shortness of breath, abdominal pain, dysuria or LE edema. Patient was then found to have COVID-19 PNA.     Internal Events: Patient had progressive oxygenation requirements maxing on NC and eventually placed on NRB. Pulmonology was then consulted given her increasing O2 requirements. The original assessment at that time was that the patient would require intubation. However, joint decision making with patient and family   led to the patient deferring intubation with the understanding of the risks of delayed intubation.     Vital Signs Last 24 Hrs  T(C): 39.1 (20 Mar 2020 04:04), Max: 40 (19 Mar 2020 16:20)  T(F): 102.3 (20 Mar 2020 04:04), Max: 104 (19 Mar 2020 16:20)  HR: 130 (20 Mar 2020 04:04) (102 - 130)  BP: 152/85 (20 Mar 2020 04:04) (118/65 - 152/85)  BP(mean): 100 (20 Mar 2020 04:04) (100 - 100)  RR: 30 (20 Mar 2020 04:04) (18 - 35)  SpO2: 90% (20 Mar 2020 04:04) (78% - 99%)    VITALS:     GENERAL: NAD, lying in bed comfortably  HEAD:  Atraumatic, Normocephalic  EYES: EOMI, PERRLA, conjunctiva and sclera clear  ENT: Moist mucous membranes  NECK: Supple, No JVD  CHEST/LUNG: Clear to auscultation bilaterally; No rales, rhonchi, wheezing, or rubs. Unlabored respirations  HEART: Regular rate and rhythm; No murmurs, rubs, or gallops  ABDOMEN: Bowel sounds present; Soft, Nontender, Nondistended. No hepatomegaly  EXTREMITIES:  2+ Peripheral Pulses, brisk capillary refill. No clubbing, cyanosis, or edema  NERVOUS SYSTEM:  Alert & Oriented X3, speech clear. No deficits   MSK: FROM all 4 extremities, full and equal strength  SKIN: No rashes or lesions    A/P:      Pulm  No active issues    Cardiac  No active issues    GI  No active issues    Neuro  No active issues    Renal  No active issues    Endo  No active issues    Heme  No active issues    PPx: HSQ; Protonix;   FEN: none; replete electrolytes PRN; NPO  Code status: Full      Dispo: c/w care on ICU  Frances:  Access/Lines: H&P    61 y/o F with no PMH p/w fever and cough since Thursday. Tmax 103. She presented to urgent care initially and was treated with tamiflu for presumed flu. Despite this she continued to have fevers. Pt denies recent travel, sick contacts or any other sx or acute complaints. Endorses pinching pain in chest when she coughs. Also with nausea and poor PO intake. Denies shortness of breath, abdominal pain, dysuria or LE edema. Patient was then found to have COVID-19 PNA. The patient was then started on thiamine/HCQ/VitC in addition to azithromycin and later switched to CTX.     Internal Events: Patient had progressive oxygenation requirements maxing on NC and eventually placed on NRB and placed prone. The patient was also found to have a fever to 104F.  Pulmonology was then consulted given her increasing O2 requirements. The original assessment at that time was that the patient would require intubation. However, joint decision making with patient and family   led to the patient deferring intubation with the understanding of the risks of delayed intubation. However, RRT was called early on 3/20 given worsening WOB and hypoxia with the result of the patient being intubated and started on pressors.    Vital Signs Last 24 Hrs  T(C): 39.1 (20 Mar 2020 04:04), Max: 40 (19 Mar 2020 16:20)  T(F): 102.3 (20 Mar 2020 04:04), Max: 104 (19 Mar 2020 16:20)  HR: 130 (20 Mar 2020 04:04) (102 - 130)  BP: 152/85 (20 Mar 2020 04:04) (118/65 - 152/85)  BP(mean): 100 (20 Mar 2020 04:04) (100 - 100)  RR: 30 (20 Mar 2020 04:04) (18 - 35)  SpO2: 90% (20 Mar 2020 04:04) (78% - 99%)    VITALS:     GENERAL: NAD, lying in bed comfortably  HEAD:  Atraumatic, Normocephalic  EYES: EOMI, PERRLA, conjunctiva and sclera clear  ENT: Moist mucous membranes  NECK: Supple, No JVD  CHEST/LUNG: Clear to auscultation bilaterally; No rales, rhonchi, wheezing, or rubs. Unlabored respirations  HEART: Regular rate and rhythm; No murmurs, rubs, or gallops  ABDOMEN: Bowel sounds present; Soft, Nontender, Nondistended. No hepatomegaly  EXTREMITIES:  2+ Peripheral Pulses, brisk capillary refill. No clubbing, cyanosis, or edema  NERVOUS SYSTEM:  Alert & Oriented X3, speech clear. No deficits   MSK: FROM all 4 extremities, full and equal strength  SKIN: No rashes or lesions    A/P:    Neuro  -Sedated    Cardiac  -on pressors    Pulm  No active issues    GI  No active issues    Renal  No active issues    Endo  No active issues    Heme  No active issues    PPx: HSQ; Protonix;   FEN: none; replete electrolytes PRN; NPO  Code status: Full      Dispo: c/w care on ICU  Frances:  Access/Lines: H&P    61 y/o F with no PMH p/w fever and cough since Thursday. Tmax 103. She presented to urgent care initially and was treated with tamiflu for presumed flu. Despite this she continued to have fevers. Pt denies recent travel, sick contacts or any other sx or acute complaints. Endorses pinching pain in chest when she coughs. Also with nausea and poor PO intake. Denies shortness of breath, abdominal pain, dysuria or LE edema. Patient was then found to have COVID-19 PNA. The patient was then started on thiamine/HCQ/VitC in addition to azithromycin and later switched to CTX.     Internal Events: Patient had progressive oxygenation requirements maxing on NC and eventually placed on NRB and placed prone. The patient was also found to have a fever to 104F.  Pulmonology was then consulted given her increasing O2 requirements. The original assessment at that time was that the patient would require intubation. However, joint decision making with patient and family   led to the patient deferring intubation with the understanding of the risks of delayed intubation. However, RRT was called early on 3/20 given worsening WOB and hypoxia with the result of the patient being intubated and started on pressors.    Vital Signs Last 24 Hrs  T(C): 39.1 (20 Mar 2020 04:04), Max: 40 (19 Mar 2020 16:20)  T(F): 102.3 (20 Mar 2020 04:04), Max: 104 (19 Mar 2020 16:20)  HR: 130 (20 Mar 2020 04:04) (102 - 130)  BP: 152/85 (20 Mar 2020 04:04) (118/65 - 152/85)  BP(mean): 100 (20 Mar 2020 04:04) (100 - 100)  RR: 30 (20 Mar 2020 04:04) (18 - 35)  SpO2: 90% (20 Mar 2020 04:04) (78% - 99%)    VITALS:     GENERAL: NAD, lying in bed comfortably  HEAD:  Atraumatic, Normocephalic  EYES: EOMI, PERRLA, conjunctiva and sclera clear  ENT: Moist mucous membranes  NECK: Supple, No JVD  CHEST/LUNG: Clear to auscultation bilaterally; No rales, rhonchi, wheezing, or rubs. Unlabored respirations  HEART: Regular rate and rhythm; No murmurs, rubs, or gallops  ABDOMEN: Bowel sounds present; Soft, Nontender, Nondistended. No hepatomegaly  EXTREMITIES:  2+ Peripheral Pulses, brisk capillary refill. No clubbing, cyanosis, or edema  NERVOUS SYSTEM:  Alert & Oriented X3, speech clear. No deficits   MSK: FROM all 4 extremities, full and equal strength  SKIN: No rashes or lesions    A/P:    Neuro  -Sedated, on propofol    Cardiac  -on levophed 0.05    Pulm  -Currently intubated  -CXR demonstrates b/l patchy opacities    GI  -no active issues    Renal  -no active issues    ID  -COVID-19 (+)  -started on Vit C/thiamine/HCQ protocol on 3/19/20  -was originally being covered for CAP with azithromycin and CTX  -narrowed to CTX given negative legionella  -other infectious w/u (RVP, BCx(3/16 , 3/18), legionella, UA) negative    Endo  -no active issues    Heme  -no active issues    PPx: HSQ; Protonix;   FEN: none; replete electrolytes PRN; NPO  Code status: Full      Dispo: c/w care on ICU  Frances:  Access/Lines: H&P    61 y/o F with no PMH p/w fever and cough since Thursday. Tmax 103. She presented to urgent care initially and was treated with tamiflu for presumed flu. Despite this she continued to have fevers. Pt denies recent travel, sick contacts or any other sx or acute complaints. Endorses pinching pain in chest when she coughs. Also with nausea and poor PO intake. Denies shortness of breath, abdominal pain, dysuria or LE edema. Patient was then found to have COVID-19 PNA. The patient was then started on thiamine/HCQ/VitC in addition to azithromycin and later switched to CTX.     Internal Events: Patient had progressive oxygenation requirements maxing on NC and eventually placed on NRB and placed prone. The patient was also found to have a fever to 104F.  Pulmonology was then consulted given her increasing O2 requirements. The original assessment at that time was that the patient would require intubation. However, joint decision making with patient and family   led to the patient deferring intubation with the understanding of the risks of delayed intubation. However, RRT was called early on 3/20 given worsening WOB and hypoxia with the result of the patient being intubated and started on pressors.    Vital Signs Last 24 Hrs  T(C): 39.1 (20 Mar 2020 04:04), Max: 40 (19 Mar 2020 16:20)  T(F): 102.3 (20 Mar 2020 04:04), Max: 104 (19 Mar 2020 16:20)  HR: 130 (20 Mar 2020 04:04) (102 - 130)  BP: 152/85 (20 Mar 2020 04:04) (118/65 - 152/85)  BP(mean): 100 (20 Mar 2020 04:04) (100 - 100)  RR: 30 (20 Mar 2020 04:04) (18 - 35)  SpO2: 90% (20 Mar 2020 04:04) (78% - 99%)    VITALS:     GENERAL: NAD, lying in bed comfortably  HEAD:  Atraumatic, Normocephalic  EYES: EOMI, PERRLA, conjunctiva and sclera clear  ENT: Moist mucous membranes  NECK: Supple, No JVD  CHEST/LUNG: Clear to auscultation bilaterally; No rales, rhonchi, wheezing, or rubs. Unlabored respirations  HEART: Regular rate and rhythm; No murmurs, rubs, or gallops  ABDOMEN: Bowel sounds present; Soft, Nontender, Nondistended. No hepatomegaly  EXTREMITIES:  2+ Peripheral Pulses, brisk capillary refill. No clubbing, cyanosis, or edema  NERVOUS SYSTEM:  Alert & Oriented X3, speech clear. No deficits   MSK: FROM all 4 extremities, full and equal strength  SKIN: No rashes or lesions    A/P:    Neuro  -Sedated, on propofol    Cardiac  -on levophed 0.05    Pulm  -Currently intubated 2/2 hypoxic respiratory failure in setting of COVID-19 (tx as below)  -CXR demonstrates b/l patchy opacities  -pronation as tolerated  -send IL-6 and IL-8    GI  -no active issues    Renal  -no active issues    ID  -COVID-19 (+)  -started on Vit C/thiamine/HCQ protocol on 3/19/20  -was originally being covered for CAP with azithromycin and CTX  -narrowed to CTX given negative legionella  -other infectious w/u (RVP, BCx(3/16 , 3/18), legionella, UA) negative    Endo  -no active issues    Heme  -no active issues    PPx: HSQ; Protonix;   FEN: none; replete electrolytes PRN; NPO  Code status: Full      Dispo: c/w care on ICU  Frances:  Access/Lines: H&P    61 y/o F with no PMH p/w fever and cough since Thursday. Tmax 103. She presented to urgent care initially and was treated with tamiflu for presumed flu. Despite this she continued to have fevers. Pt denies recent travel, sick contacts or any other sx or acute complaints. Endorses pinching pain in chest when she coughs. Also with nausea and poor PO intake. Denies shortness of breath, abdominal pain, dysuria or LE edema. Patient was then found to have COVID-19 PNA. The patient was then started on thiamine/HCQ/VitC in addition to azithromycin and later switched to CTX.     Internal Events: Patient had progressive oxygenation requirements maxing on NC and eventually placed on NRB and placed prone. The patient was also found to have a fever to 104F.  Pulmonology was then consulted given her increasing O2 requirements. The original assessment at that time was that the patient would require intubation. However, joint decision making with patient and family   led to the patient deferring intubation with the understanding of the risks of delayed intubation. However, RRT was called early on 3/20 given worsening WOB and hypoxia with the result of the patient being intubated and started on pressors.    Vital Signs Last 24 Hrs  T(C): 39.1 (20 Mar 2020 04:04), Max: 40 (19 Mar 2020 16:20)  T(F): 102.3 (20 Mar 2020 04:04), Max: 104 (19 Mar 2020 16:20)  HR: 130 (20 Mar 2020 04:04) (102 - 130)  BP: 152/85 (20 Mar 2020 04:04) (118/65 - 152/85)  BP(mean): 100 (20 Mar 2020 04:04) (100 - 100)  RR: 30 (20 Mar 2020 04:04) (18 - 35)  SpO2: 90% (20 Mar 2020 04:04) (78% - 99%)    VITALS:     GENERAL: NAD, lying in bed comfortably  HEAD:  Atraumatic, Normocephalic  EYES: EOMI, PERRLA, conjunctiva and sclera clear  ENT: Moist mucous membranes  NECK: Supple, No JVD  CHEST/LUNG: Clear to auscultation bilaterally; No rales, rhonchi, wheezing, or rubs. Unlabored respirations  HEART: Regular rate and rhythm; No murmurs, rubs, or gallops  ABDOMEN: Bowel sounds present; Soft, Nontender, Nondistended. No hepatomegaly  EXTREMITIES:  2+ Peripheral Pulses, brisk capillary refill. No clubbing, cyanosis, or edema  NERVOUS SYSTEM:  Alert & Oriented X3, speech clear. No deficits   MSK: FROM all 4 extremities, full and equal strength  SKIN: No rashes or lesions    A/P:  61 y/o female with no PMHx admitted for hypoxia in setting of confirmed COVID PNA, transferred to MICU for intubation in setting of hypoxic respiratory failure     Neuro  -Sedated, on propofol    Cardiac  -on levophed 0.05    Pulm  -Currently intubated 2/2 hypoxic respiratory failure in setting of COVID-19 (tx as below)  -CXR demonstrates b/l patchy opacities  -pronation as tolerated  -send IL-6 and IL-8    GI  -no active issues    Renal  -no active issues    ID  -COVID-19 (+)  -started on Vit C/thiamine/HCQ protocol on 3/19/20  -was originally being covered for CAP with azithromycin and CTX  -narrowed to CTX given negative legionella  -other infectious w/u (RVP, BCx(3/16 , 3/18), legionella, UA) negative    Endo  -no active issues    Heme  -no active issues    PPx: HSQ; Protonix;   FEN: none; replete electrolytes PRN; NPO  Code status: Full      Dispo: c/w care on ICU  Frances:  Access/Lines: H&P    61 y/o F with no PMH p/w fever and cough since Thursday. Tmax 103. She presented to urgent care initially and was treated with tamiflu for presumed flu. Despite this she continued to have fevers. Pt denies recent travel, sick contacts or any other sx or acute complaints. Endorses pinching pain in chest when she coughs. Also with nausea and poor PO intake. Denies shortness of breath, abdominal pain, dysuria or LE edema. Patient was then found to have COVID-19 PNA. The patient was then started on thiamine/HCQ/VitC in addition to azithromycin and later switched to CTX.     Internal Events: Patient had progressive oxygenation requirements maxing on NC and eventually placed on NRB and placed prone. The patient was also found to have a fever to 104F.  Pulmonology was then consulted given her increasing O2 requirements. The original assessment at that time was that the patient would require intubation. However, joint decision making with patient and family led to the patient deferring intubation with the understanding of the risks of delayed intubation. However, RRT was called early on 3/20 given increasing WOB, hypoxia to 85% on NRB, and worsening mental status with the result of the patient being intubated and started on pressors after patient and patient's daughter consenting to intubation and ICU care.    Vital Signs Last 24 Hrs  T(C): 39.1 (20 Mar 2020 04:04), Max: 40 (19 Mar 2020 16:20)  T(F): 102.3 (20 Mar 2020 04:04), Max: 104 (19 Mar 2020 16:20)  HR: 130 (20 Mar 2020 04:04) (102 - 130)  BP: 152/85 (20 Mar 2020 04:04) (118/65 - 152/85)  BP(mean): 100 (20 Mar 2020 04:04) (100 - 100)  RR: 30 (20 Mar 2020 04:04) (18 - 35)  SpO2: 90% (20 Mar 2020 04:04) (78% - 99%)    VITALS:     GENERAL: intubated, sedated  HEAD:  Atraumatic, Normocephalic  CHEST/LUNG: Crackles b/l  HEART: tachycardic. no mrg  ABDOMEN: Bowel sounds present; Soft, Nontender, Nondistended.  EXTREMITIES:  2+ Peripheral Pulses, brisk capillary refill. No clubbing, cyanosis, or edema  NERVOUS SYSTEM:  sedated  SKIN: No rashes or lesions    A/P:  61 y/o female with no PMHx admitted for hypoxia in setting of confirmed COVID PNA, transferred to MICU for intubation in setting of hypoxic respiratory failure     Neuro  -Sedated, on propofol  -also on versed and nimbex    Cardiac  -on levophed 0.05    Pulm  -Currently intubated 2/2 hypoxic respiratory failure in setting of COVID-19 (tx as below)  -CXR demonstrates b/l patchy opacities  -pronation as tolerated  -send IL-6 and IL-8    GI  -no active issues    Renal  -no active issues    ID  -COVID-19 (+) 3/16/20  -started on Vit C/thiamine/HCQ protocol on 3/19/20  -was originally being covered for CAP with azithromycin and CTX  -narrowed to CTX given negative legionella  -other infectious w/u (RVP, BCx(3/16 , 3/18), legionella, UA) negative    Endo  -no active issues    Heme  -no active issues    PPx: HSQ; Protonix;   FEN: none; replete electrolytes PRN; NPO  Code status: Full      Dispo: c/w care on ICU  Access/Lines: left central line in place. IO on right femur H&P    61 y/o F with no PMH p/w fever and cough since Thursday. Tmax 103. She presented to urgent care initially and was treated with tamiflu for presumed flu. Despite this she continued to have fevers. Pt denies recent travel, sick contacts or any other sx or acute complaints. Endorses pinching pain in chest when she coughs. Also with nausea and poor PO intake. Denies shortness of breath, abdominal pain, dysuria or LE edema. Patient was then found to have COVID-19 PNA. The patient was then started on thiamine/HCQ/VitC in addition to azithromycin and later switched to CTX.     Internal Events: Patient had progressive oxygenation requirements maxing on NC and eventually placed on NRB and placed prone. The patient was also found to have a fever to 104F.  Pulmonology was then consulted given her increasing O2 requirements. The original assessment at that time was that the patient would require intubation. However, joint decision making with patient and family led to the patient deferring intubation with the understanding of the risks of delayed intubation. However, RRT was called early on 3/20 given increasing WOB, hypoxia to 85% on NRB, and worsening mental status with the result of the patient being intubated and started on pressors after patient and patient's daughter consenting to intubation and ICU care.    Vital Signs Last 24 Hrs  T(C): 39.1 (20 Mar 2020 04:04), Max: 40 (19 Mar 2020 16:20)  T(F): 102.3 (20 Mar 2020 04:04), Max: 104 (19 Mar 2020 16:20)  HR: 130 (20 Mar 2020 04:04) (102 - 130)  BP: 152/85 (20 Mar 2020 04:04) (118/65 - 152/85)  BP(mean): 100 (20 Mar 2020 04:04) (100 - 100)  RR: 30 (20 Mar 2020 04:04) (18 - 35)  SpO2: 90% (20 Mar 2020 04:04) (78% - 99%)    VITALS:     GENERAL: intubated, sedated  HEAD:  Atraumatic, Normocephalic  CHEST/LUNG: Crackles b/l  HEART: tachycardic. no mrg  ABDOMEN: Bowel sounds present; Soft, Nontender, Nondistended.  EXTREMITIES:  2+ Peripheral Pulses, brisk capillary refill. No clubbing, cyanosis, or edema  NERVOUS SYSTEM:  sedated  SKIN: No rashes or lesions    A/P:  61 y/o female with no PMHx admitted for hypoxia in setting of confirmed COVID PNA, transferred to MICU for intubation in setting of hypoxic respiratory failure     Neuro  -Sedated, on propofol  -also on versed and nimbex    Cardiac  -on levophed 0.05    Pulm  -Currently intubated 2/2 hypoxic respiratory failure in setting of COVID-19 (tx as below)  -CXR demonstrates b/l patchy opacities  -pronation as tolerated  -send IL-6 and IL-8    GI  -no active issues    Renal  -no active issues    ID  -COVID-19 (+) 3/16/20  -started on Vit C/thiamine/HCQ protocol on 3/19/20  -was originally being covered for CAP with azithromycin and CTX  -narrowed to CTX given negative legionella  -other infectious w/u (RVP, BCx(3/16 , 3/18), legionella, UA) negative    Endo  -no active issues    Heme  -no active issues    PPx: HSQ; Protonix;   FEN: none; replete electrolytes PRN; NPO  Code status: Full      Dispo: c/w care on ICU  Access/Lines: left central line in place. IO on right femur    Attending Attestation:  59 yo woman COVID -19+, multifocal PNA, ARDS,  treated with Plaquenil, vit C, thiamine, has experienced progressive hypoxemic respiratory failure over the last 18 hours, responded somewhat to proning while on 100% NRB, but was intubated and transferred to MICU.  She was sedated and paralyzed, required NE, requiring 80% FiO2 and 14 PEEP, Pplateau 28  - Continue hydroxychloroquine, thiamine and Vit C  - continue CTX and f/u blood cxs  - maintain lung protective vent settings  - consider systemic anticoagulation  - follow CRP, LFT's, d-dimer, ferritin, creatinine and UOP  - send IL-6, IL-8  - continue paralysis for now  very guarded prognosis   CC time 45 min excluding time spent on POCUS

## 2025-02-16 NOTE — ASSESSMENT & PLAN NOTE
Patient presented to the emergency department after a fall with injury to her left lower extremity.  Patient was noted on CT imaging to have distal tibia fracture as well as a proximal and distal fibular fracture.  Placed in posterior long-leg splint while in the emergency department.    -Consult to Ortho  -NWB  -DVT prophylaxis  -Multimodal pain regimen

## 2025-02-16 NOTE — H&P
H&P - Trauma   Name: Delmy Curiel 74 y.o. female I MRN: [id number]  Unit/Bed#: ED-41 I Date of Admission: 2/15/2025   Date of Service: 2/16/2025 I Hospital Day: 0     Assessment & Plan  Fall, initial encounter  Patient presents to the emergency department after falling while shoveling her walkway.  Patient states that she had slipped on ice causing her to fall when she felt a snap in her left lower extremity.  Patient was unable to ambulate following the incident.  Denies head strike, LOC, AC/AP therapy at this time.  Nondisplaced spiral fracture of shaft of left tibia, initial encounter for closed fracture  Patient presented to the emergency department after a fall with injury to her left lower extremity.  Patient was noted on CT imaging to have distal tibia fracture as well as a proximal and distal fibular fracture.  Placed in posterior long-leg splint while in the emergency department.    -Consult to Ortho  -NWB  -DVT prophylaxis  -Multimodal pain regimen    Trauma Alert: Evaluation; trauma team notified at 0050 via phone   Model of Arrival: Self    Trauma Team: Attending [place] and [facility] [place]  Consultants:     None     History of Present Illness   Chief Complaint: Fall while shoveling her walkway  Mechanism:Fall     Delmy Curiel is a 74 y.o. female who presents to the ED after a fall while shoveling her walkway. Patient denies headstrike or LOC. She does not take antiplatelet/anticoagulation therapy at this time. She is having pain in her left leg and was unable to ambulate after the incident. No other acute concerns at this time. Denies chest pain, SOB, cough, abdominal pain, n/v/d, fever, chills, dizziness, lightheadedness, HA, dysuria, hematuria, hematochezia, or melena.       Review of Systems   Constitutional:  Negative for appetite change, chills, diaphoresis, fatigue and fever.   HENT:  Negative for congestion, ear pain, postnasal drip, rhinorrhea, sore throat and trouble  swallowing.    Eyes:  Negative for pain and visual disturbance.   Respiratory:  Negative for cough and shortness of breath.    Cardiovascular:  Negative for chest pain and palpitations.   Gastrointestinal:  Negative for abdominal pain, constipation, diarrhea, nausea and vomiting.   Genitourinary:  Negative for decreased urine volume, dysuria and hematuria.   Musculoskeletal:  Negative for arthralgias and back pain.        Left leg pain    Skin:  Negative for color change and rash.   Neurological:  Negative for dizziness, seizures, syncope, weakness, light-headedness and headaches.   All other systems reviewed and are negative.    Medical History Review: I have reviewed the patient's PMH, PSH, Social History, Family History, Meds, and Allergies   Immunization History   Administered Date(s) Administered    COVID-19 MODERNA VACC 0.5 ML IM 02/08/2021, 03/08/2021, 11/15/2021    INFLUENZA 12/17/2014, 12/30/2015, 11/27/2018    Influenza Quadrivalent Preservative Free 3 years and older IM 10/17/2014    Influenza Quadrivalent, 6-35 Months IM 11/25/2015    Influenza Split High Dose Preservative Free IM 11/25/2016    Influenza, high dose seasonal 0.7 mL 11/27/2018, 11/25/2019, 10/14/2020, 12/08/2021, 12/01/2022, 12/04/2023    Pneumococcal Conjugate 13-Valent 11/25/2016    Pneumococcal Polysaccharide PPV23 11/27/2018    Tdap 08/06/2010, 07/17/2017     Last Tetanus: 2017    1. Before the illness or injury that brought you to the Emergency, did you need someone to help you on a regular basis? 0=No   2. Since the illness or injury that brought you to the Emergency, have you needed more help than usual to take care of yourself? 1=Yes   3. Have you been hospitalized for one or more nights during the past 6 months (excluding a stay in the Emergency Department)? 0=No   4. In general, do you see well? 0=Yes   5. In general, do you have serious problems with your memory? 0=No   6. Do you take more than three different medications  everyday? 0=No   TOTAL   1     Did you order a geriatric consult if the score was 2 or greater?: no       Objective :  Temp:  [97.9 °F (36.6 °C)] 97.9 °F (36.6 °C)  HR:  [86] 86  BP: (175)/(76) 175/76  Resp:  [18] 18  SpO2:  [99 %] 99 %  O2 Device: None (Room air)    Initial Vitals:   Temperature: 97.9 °F (36.6 °C) (02/15/25 1949)  Pulse: 86 (02/15/25 1949)  Respirations: 18 (02/15/25 1949)  Blood Pressure: (!) 175/76 (02/15/25 1949)    Primary Survey:   Airway:        Status: patent;        Pre-hospital Interventions: none        Hospital Interventions: none  Breathing:        Pre-hospital Interventions: none       Effort: normal       Right breath sounds: normal       Left breath sounds: normal  Circulation:        Rhythm: regular       Rate: regular   Right Pulses Left Pulses    R radial: 2+    R pedal: 2+     L radial: 2+    L pedal: 2+       Disability:        GCS: Eye: 4; Verbal: 5 Motor: 6 Total: 15       Right Pupil: 4 mm;  round;  reactive         Left Pupil:  round;  reactive      R Motor Strength L Motor Strength    R : 5/5  R dorsiflex: 5/5  R plantarflex: 5/5 L : 5/5  L dorsiflex: 5/5  L plantarflex: 5/5        Sensory:  No sensory deficit  Exposure:       Completed: No      Secondary Survey:  Physical Exam  Vitals and nursing note reviewed.   Constitutional:       General: She is not in acute distress.     Appearance: Normal appearance. She is normal weight.   HENT:      Head: Normocephalic and atraumatic.      Right Ear: External ear normal.      Left Ear: External ear normal.      Nose: Nose normal.      Mouth/Throat:      Pharynx: Oropharynx is clear.   Eyes:      Extraocular Movements: Extraocular movements intact.      Conjunctiva/sclera: Conjunctivae normal.      Pupils: Pupils are equal, round, and reactive to light.   Cardiovascular:      Rate and Rhythm: Normal rate and regular rhythm.      Pulses: Normal pulses.      Heart sounds: Normal heart sounds.      Comments: RRR with +S1 and  S2, no murmurs appreciated on exam. Peripheral pulses intact.    Pulmonary:      Effort: Pulmonary effort is normal. No respiratory distress.      Breath sounds: Normal breath sounds. No wheezing, rhonchi or rales.      Comments: CTABL with no abnormal lung sounds such as wheezes or rales appreciated on exam.     Abdominal:      General: Bowel sounds are normal. There is no distension.      Palpations: Abdomen is soft.      Tenderness: There is no abdominal tenderness.      Comments: Soft, non tender, normo-active bowel sounds. Without rigidity, guarding, or distension.     Musculoskeletal:         General: Tenderness present. Normal range of motion.      Cervical back: Normal range of motion. No tenderness.      Comments: Tenderness to palpation of the left lower extremity along tibia. Long posterior leg splint in place at bedside. Neurovascularly intact.   Skin:     General: Skin is warm.   Neurological:      General: No focal deficit present.      Mental Status: She is alert and oriented to person, place, and time. Mental status is at baseline.             Lab Results: I have reviewed the following results:  Recent Labs     02/15/25  2202   WBC 14.40*   HGB 14.0   HCT 43.5      SODIUM 135   K 3.8      CO2 23   BUN 16   CREATININE 0.74   GLUC 104   AST 16   ALT 14   ALB 4.3   TBILI 0.61   ALKPHOS 93   PTT 27   INR 0.88       Imaging Results: I have personally reviewed pertinent images saved in PACS. CT scan findings (and other pertinent positive findings on images) were discussed with radiology. My interpretation of the images/reports are as follows:  Chest Xray(s): negative for acute findings   FAST exam(s): negative for acute findings   CT Scan(s): positive for acute findings: Left distal tibial fracture with proximal and distal fibular fractures.   Additional Xray(s): Similar as CT imaging     Other Studies:

## 2025-02-16 NOTE — ED PROVIDER NOTES
Time reflects when diagnosis was documented in both MDM as applicable and the Disposition within this note       Time User Action Codes Description Comment    2/16/2025  1:38 AM [place] Add [S82.392A] Other closed fracture of distal end of left tibia, initial encounter     2/16/2025  1:50 AM [place] Add [W19.XXXA] Fall, initial encounter     2/16/2025  1:51 AM [place], [place] Add [S82.242A] Closed displaced spiral fracture of shaft of left tibia, initial encounter     2/16/2025  1:51 AM [place], [place] Remove [S82.242A] Closed displaced spiral fracture of shaft of left tibia, initial encounter     2/16/2025  1:51 AM [place] Add [S82.245A] Closed nondisplaced spiral fracture of shaft of left tibia, initial encounter     2/16/2025  1:51 AM [place] Remove [S82.245A] Closed nondisplaced spiral fracture of shaft of left tibia, initial encounter     2/16/2025  1:53 AM [place] Add [S82.245A] Nondisplaced spiral fracture of shaft of left tibia, initial encounter for closed fracture     2/16/2025  3:29 AM Pawel Corbett Add [S82.832A] Other closed fracture of proximal end of left fibula, initial encounter     2/16/2025  3:30 AM Pawel Corbett Add [S82.832A] Other closed fracture of distal end of left fibula, initial encounter           ED Disposition       ED Disposition   Admit    Condition   Stable    Date/Time   Sun Feb 16, 2025  7:18 AM    Comment   Case was discussed with Dr. Tian and the patient's admission status was agreed to be Admission Status: inpatient status to the service of Dr. Tian .               Assessment & Plan       Medical Decision Making  74 year old female presenting with a left lower leg injury after falling on ice while clearing her walkway 2 hours ago. Patient states she landed on her left leg and felt like she heard a crack. She has been unable to bear weight. She denies head strike, LOC or use of blood thinners. On exam, mild distress secondary to pain. Mild deformity noted on  anterior distal tibia with edema. Limited ROM. 2+ DP pulse, neurovascularly intact. Mild tenderness below left knee with no obvious deformity or edema.    DDX including but not limited to: acute fracture of tibia or fibula, muscle strain, ankle sprain  Plan: XR, Pain control    XR shows distal tibial fracture as well as proximal and distal fracture of fibula. Fractures nondisplaced. Ortho consulted, per Dr. Gabo CRAIG, patient will not require surgery, recommended splinting and NWB. Static long leg splint applied, see procedure note. Case was discussed with Dr. Tian from trauma, patient to be admitted under his service for pain management and PT evaluation. All of imaging results explained to patient who expresses understanding. Pain improved with toradol. Patient expresses concern for being able to ambulate at her house, is agreeable to admission at this time.     Problems Addressed:  Fall, initial encounter: acute illness or injury  Nondisplaced spiral fracture of shaft of left tibia, initial encounter for closed fracture: acute illness or injury  Other closed fracture of distal end of left fibula, initial encounter: acute illness or injury  Other closed fracture of distal end of left tibia, initial encounter: acute illness or injury  Other closed fracture of proximal end of left fibula, initial encounter: acute illness or injury    Amount and/or Complexity of Data Reviewed  Radiology: ordered and independent interpretation performed.    Risk  Prescription drug management.        ED Course as of 02/16/25 0718   Sun Feb 16, 2025   0046 Trauma consulted and will evaluate       Medications   Cholecalciferol (VITAMIN D3) tablet 2,000 Units (has no administration in time range)   multivitamin stress formula tablet 1 tablet (has no administration in time range)   acetaminophen (TYLENOL) tablet 975 mg (975 mg Oral Given 2/16/25 0251)   oxyCODONE (ROXICODONE) split tablet 2.5 mg (has no administration in time range)      Or   oxyCODONE (ROXICODONE) IR tablet 5 mg (has no administration in time range)   HYDROmorphone HCl (DILAUDID) injection 0.2 mg (has no administration in time range)   naloxone (NARCAN) 0.04 mg/mL syringe 0.04 mg (has no administration in time range)   senna-docusate sodium (SENOKOT S) 8.6-50 mg per tablet 1 tablet (1 tablet Oral Given 2/16/25 0252)   polyethylene glycol (MIRALAX) packet 17 g (has no administration in time range)   ondansetron (ZOFRAN) injection 4 mg (has no administration in time range)   enoxaparin (LOVENOX) subcutaneous injection 30 mg (30 mg Subcutaneous Given 2/16/25 0252)   ketorolac (TORADOL) injection 15 mg (15 mg Intramuscular Given 2/15/25 2026)       ED Risk Strat Scores                            SBIRT 22yo+      Flowsheet Row Most Recent Value   Initial Alcohol Screen: US AUDIT-C     1. How often do you have a drink containing alcohol? 0 Filed at: 02/15/2025 1947   2. How many drinks containing alcohol do you have on a typical day you are drinking?  0 Filed at: 02/15/2025 1947   3a. Male UNDER 65: How often do you have five or more drinks on one occasion? 0 Filed at: 02/15/2025 1947   3b. FEMALE Any Age, or MALE 65+: How often do you have 4 or more drinks on one occassion? 0 Filed at: 02/15/2025 1947   Audit-C Score 0 Filed at: 02/15/2025 1947   LIUDMILA: How many times in the past year have you...    Used an illegal drug or used a prescription medication for non-medical reasons? Never Filed at: 02/15/2025 1947                            History of Present Illness       Chief Complaint   Patient presents with    Fall     Patient was shoveling walk, stepped off of walk, slipped and fell backwards with left leg hitting the ground. -HS,-thinners, -LOC.       Past Medical History:   Diagnosis Date    Diverticulosis 2021    Long term use of drug     RESOLVED: 17OCT2014      History reviewed. No pertinent surgical history.   Family History   Problem Relation Age of Onset    Colon cancer Mother      Lung cancer Maternal Aunt     Brain cancer Maternal Uncle       Social History     Tobacco Use    Smoking status: Never     Passive exposure: Current    Smokeless tobacco: Never   Vaping Use    Vaping status: Never Used   Substance Use Topics    Alcohol use: Yes     Comment: SOCIAL     Drug use: Not Currently     Comment: FORMER RECREATIONAL DRUG USER       E-Cigarette/Vaping    E-Cigarette Use Never User       E-Cigarette/Vaping Substances    Nicotine No     THC No     CBD No     Flavoring No     Other No     Unknown No       I have reviewed and agree with the history as documented.     74 year old female presenting with a left lower leg injury after falling on ice while clearing her walkway 2 hours ago. Patient states she landed on her left leg and felt like she heard a crack. She has been unable to bear weight. She denies head strike, LOC or use of blood thinners. She denies headache, chest pain, SOB, abdominal pain, NVD, urinary symptoms, numbness, tingling, dizziness, or lightheadedness. She has not taken any medications prior to arrival to ED.       Fall  Associated symptoms: no abdominal pain, no back pain, no chest pain, no headaches, no nausea, no neck pain and no vomiting        Review of Systems   Constitutional:  Negative for chills and fever.   HENT: Negative.     Respiratory:  Negative for cough and shortness of breath.    Cardiovascular:  Negative for chest pain and palpitations.   Gastrointestinal:  Negative for abdominal pain, diarrhea, nausea and vomiting.   Genitourinary:  Negative for dysuria and hematuria.   Musculoskeletal:  Negative for back pain, neck pain and neck stiffness.   Skin:  Negative for color change.   Neurological:  Negative for dizziness, light-headedness, numbness and headaches.   Psychiatric/Behavioral: Negative.             Objective       ED Triage Vitals [02/15/25 1949]   Temperature Pulse Blood Pressure Respirations SpO2 Patient Position - Orthostatic VS   97.9 °F (36.6  °C) 86 (!) 175/76 18 99 % Sitting      Temp Source Heart Rate Source BP Location FiO2 (%) Pain Score    Oral Monitor Right arm -- 5      Vitals      Date and Time Temp Pulse SpO2 Resp BP Pain Score FACES Pain Rating User   02/16/25 0405 -- 76 95 % 16 115/57 -- -- NB   02/16/25 0251 -- -- -- -- -- 3 -- NB   02/15/25 2026 -- -- -- -- -- 5 -- DP   02/15/25 1949 97.9 °F (36.6 °C) 86 99 % 18 175/76 5 -- LO            Physical Exam  Vitals reviewed.   Constitutional:       General: She is in acute distress (mild, secondary to pain).      Appearance: Normal appearance. She is normal weight. She is not ill-appearing or toxic-appearing.   HENT:      Head: Normocephalic and atraumatic.      Nose: Nose normal.      Mouth/Throat:      Mouth: Mucous membranes are moist.      Pharynx: Oropharynx is clear.   Eyes:      Conjunctiva/sclera: Conjunctivae normal.   Cardiovascular:      Rate and Rhythm: Normal rate and regular rhythm.      Pulses: Normal pulses.      Heart sounds: Normal heart sounds.   Pulmonary:      Effort: Pulmonary effort is normal.      Breath sounds: Normal breath sounds.   Abdominal:      General: Abdomen is flat. Bowel sounds are normal.      Palpations: Abdomen is soft.      Tenderness: There is no abdominal tenderness. There is no guarding or rebound.   Musculoskeletal:      Cervical back: Normal range of motion and neck supple.      Left lower leg: Deformity, tenderness and bony tenderness present. No lacerations.        Legs:       Comments: Deformity with pain noted to anterior aspect of distal tibia. 2+ DP and PT pulses, patient able to move toes, normal capillary refill. Mild pain to palpation of left knee, normal ROM.   Skin:     General: Skin is warm and dry.      Capillary Refill: Capillary refill takes less than 2 seconds.   Neurological:      General: No focal deficit present.      Mental Status: She is alert and oriented to person, place, and time. Mental status is at baseline.   Psychiatric:          Mood and Affect: Mood normal.         Behavior: Behavior normal.         Results Reviewed       Procedure Component Value Units Date/Time    CBC and differential [[id number]]     Lab Status: No result Specimen: Blood     Comprehensive metabolic panel [[id number]]     Lab Status: No result Specimen: Blood     Platelet count [[id number]]     Lab Status: No result Specimen: Blood     Comprehensive metabolic panel [[id number]] Collected: 02/15/25 2202    Lab Status: Final result Specimen: Blood from Arm, Right Updated: 02/15/25 2230     Sodium 135 mmol/L      Potassium 3.8 mmol/L      Chloride 103 mmol/L      CO2 23 mmol/L      ANION GAP 9 mmol/L      BUN 16 mg/dL      Creatinine 0.74 mg/dL      Glucose 104 mg/dL      Calcium 9.2 mg/dL      AST 16 U/L      ALT 14 U/L      Alkaline Phosphatase 93 U/L      Total Protein 6.9 g/dL      Albumin 4.3 g/dL      Total Bilirubin 0.61 mg/dL      eGFR 80 ml/min/1.73sq m     Narrative:      National [employer] guidelines for Chronic Kidney Disease (CKD):     Stage 1 with normal or high GFR (GFR > 90 mL/min/1.73 square meters)    Stage 2 Mild CKD (GFR = 60-89 mL/min/1.73 square meters)    Stage 3A Moderate CKD (GFR = 45-59 mL/min/1.73 square meters)    Stage 3B Moderate CKD (GFR = 30-44 mL/min/1.73 square meters)    Stage 4 Severe CKD (GFR = 15-29 mL/min/1.73 square meters)    Stage 5 End Stage CKD (GFR <15 mL/min/1.73 square meters)  Note: GFR calculation is accurate only with a steady state creatinine    Protime-INR [[id number]]  (Normal) Collected: 02/15/25 2202    Lab Status: Final result Specimen: Blood from Arm, Right Updated: 02/15/25 2227     Protime 12.7 seconds      INR 0.88    Narrative:      INR Therapeutic Range    Indication                                             INR Range      Atrial Fibrillation                                               2.0-3.0  Hypercoagulable State                                    2.0.2.3  Left Ventricular Asist Device                             2.0-3.0  Mechanical Heart Valve                                  -    Aortic(with afib, MI, embolism, HF, LA enlargement,    and/or coagulopathy)                                     2.0-3.0 (2.5-3.5)     Mitral                                                             2.5-3.5  Prosthetic/Bioprosthetic Heart Valve               2.0-3.0  Venous thromboembolism (VTE: VT, PE        2.0-3.0    APTT [[id number]]  (Normal) Collected: 02/15/25 2202    Lab Status: Final result Specimen: Blood from Arm, Right Updated: 02/15/25 2227     PTT 27 seconds     CBC and differential [[id number]]  (Abnormal) Collected: 02/15/25 2202    Lab Status: Final result Specimen: Blood from Arm, Right Updated: 02/15/25 2216     WBC 14.40 Thousand/uL      RBC 5.08 Million/uL      Hemoglobin 14.0 g/dL      Hematocrit 43.5 %      MCV 86 fL      MCH 27.6 pg      MCHC 32.2 g/dL      RDW 14.3 %      MPV 9.8 fL      Platelets 250 Thousands/uL      nRBC 0 /100 WBCs      Segmented % 89 %      Immature Grans % 1 %      Lymphocytes % 5 %      Monocytes % 4 %      Eosinophils Relative 0 %      Basophils Relative 1 %      Absolute Neutrophils 12.94 Thousands/µL      Absolute Immature Grans 0.08 Thousand/uL      Absolute Lymphocytes 0.68 Thousands/µL      Absolute Monocytes 0.60 Thousand/µL      Eosinophils Absolute 0.02 Thousand/µL      Basophils Absolute 0.08 Thousands/µL             CT lower extremity wo contrast left   Final Interpretation by Eddie Coyne MD (02/16 0022)      1.  Intra-articular and comminuted distal tibial fracture   2.  Proximal and distal fibular fractures         Workstation performed: [id number]         XR chest 1 view portable   ED Interpretation by Pawel Corbett PA-C (02/15 2313)   No acute cardiopulmonary disease per my read in ED.       XR ankle 3+ views LEFT   ED Interpretation by Pawel Corbett PA-C (02/15 2146)   Mildly displaced distal tibial fracture. Distal fibular fracture.      XR tibia fibula 2  views LEFT   ED Interpretation by Pawel Corbett PA-C (02/15 2146)   Mildly displaced distal tibial fracture. Mildly displaced proximal fibula fracture.          Splint application    Date/Time: 2025 3:37 AM    Performed by: Pawel Corbett PA-C  Authorized by: Pawel Corbett PA-C    Other Assisting Provider: No    Verbal consent obtained?: Yes    Risks and benefits: Risks, benefits and alternatives were discussed    Consent given by:  Patient  Patient states understanding of procedure being performed: Yes    Patient identity confirmed:  Verbally with patient  Pre-procedure details:     Sensation:  Normal  Procedure details:     Laterality:  Left    Location:  Leg    Leg:  L lower leg and L upper leg    Strapping: No      Splint composition: static      Splint type:  Long leg (static long leg)    Supplies:  Cotton padding, Ortho-Glass and elastic bandage  Post-procedure details:     Pain:  Improved    Sensation:  Normal    Skin color:  Pink    Patient tolerance of procedure:  Tolerated well, no immediate complications  Comments:      Patient neurovascularly intact following application of static long leg splint      ED Medication and Procedure Management   Prior to Admission Medications   Prescriptions Last Dose Informant Patient Reported? Taking?   Cholecalciferol (Vitamin D) 50 MCG (2000 UT) tablet  Self Yes No   Sig: Take 2,000 Units by mouth daily   Cyanocobalamin (B-12 PO)  Self Yes No   Sig: Take by mouth   Lactobacillus-Inulin (PROBIOTIC DIGESTIVE SUPPORT PO)  Self Yes No   Sig: Take by mouth   Multiple Vitamin (multivitamin) capsule  Self Yes No   Sig: Take 1 capsule by mouth daily   acyclovir (ZOVIRAX) 400 MG tablet   No No   Sig: Take 1 tablet (400 mg total) by mouth 3 (three) times a day   azelastine (ASTELIN) 0.1 % nasal spray  Self No No   Si spray into each nostril 2 (two) times a day   Patient not taking: Reported on 2024   fluocinolone acetonide (DermOtic) 0.01 % otic oil   No No   Sig:  Administer 5 drops into both ears 2 (two) times a day   fluticasone (FLONASE) 50 mcg/act nasal spray   No No   Si spray into each nostril daily   hydrocortisone 2.5 % ointment  Self Yes No   Patient not taking: Reported on 10/4/2024   ketoconazole (NIZORAL) 2 % cream  Self Yes No   Patient not taking: Reported on 10/4/2024   levocetirizine (XYZAL) 5 MG tablet   No No   Sig: TAKE 1 TABLET BY MOUTH EVERY DAY IN THE EVENING      Facility-Administered Medications: None     Patient's Medications   Discharge Prescriptions    No medications on file     No discharge procedures on file.  ED SEPSIS DOCUMENTATION   Time reflects when diagnosis was documented in both MDM as applicable and the Disposition within this note       Time User Action Codes Description Comment    2025  1:38 AM [place] Rent[place] Add [S82.392A] Other closed fracture of distal end of left tibia, initial encounter     2025  1:50 AM [place] Add [W19.XXXA] Fall, initial encounter     2025  1:51 AM [place] Add [S82.242A] Closed displaced spiral fracture of shaft of left tibia, initial encounter     2025  1:51 AM [place] Camron Remove [S82.242A] Closed displaced spiral fracture of shaft of left tibia, initial encounter     2025  1:51 AM [place] Camron Add [S82.245A] Closed nondisplaced spiral fracture of shaft of left tibia, initial encounter     2025  1:51 AM [place] [S82.245A] Closed nondisplaced spiral fracture of shaft of left tibia, initial encounter     2025  1:53 AM [place] Add [S82.245A] Nondisplaced spiral fracture of shaft of left tibia, initial encounter for closed fracture     2025  3:29 AM Pawel Corbett Add [S82.832A] Other closed fracture of proximal end of left fibula, initial encounter     2025  3:30 AM Pawel Corbett Add [S82.832A] Other closed fracture of distal end of left fibula, initial encounter                  Pawel Corbett PA-C  25 0718

## 2025-02-16 NOTE — PHYSICAL THERAPY NOTE
PHYSICAL THERAPY NOTE    Patient Name: Delmy Candelario Check  Today's Date: 2/16/2025 02/16/25 1145   Note Type   Note type Cancelled Session   Cancel Reasons Medical status   Additional Comments Chart reviewed including ortho consult. Awaiting definitive recommendations for +/- OR. Will follow      Chauncey Bolanos, PT

## 2025-02-17 ENCOUNTER — ANESTHESIA (INPATIENT)
Dept: PERIOP | Facility: HOSPITAL | Age: 75
DRG: 493 | End: 2025-02-17
Payer: MEDICARE

## 2025-02-17 ENCOUNTER — APPOINTMENT (INPATIENT)
Dept: RADIOLOGY | Facility: HOSPITAL | Age: 75
DRG: 493 | End: 2025-02-17
Payer: MEDICARE

## 2025-02-17 ENCOUNTER — ANESTHESIA EVENT (INPATIENT)
Dept: PERIOP | Facility: HOSPITAL | Age: 75
DRG: 493 | End: 2025-02-17
Payer: MEDICARE

## 2025-02-17 LAB
ANION GAP SERPL CALCULATED.3IONS-SCNC: 7 MMOL/L (ref 4–13)
BASOPHILS # BLD AUTO: 0.09 THOUSANDS/ΜL (ref 0–0.1)
BASOPHILS NFR BLD AUTO: 1 % (ref 0–1)
BUN SERPL-MCNC: 12 MG/DL (ref 5–25)
CALCIUM SERPL-MCNC: 8.7 MG/DL (ref 8.4–10.2)
CHLORIDE SERPL-SCNC: 108 MMOL/L (ref 96–108)
CO2 SERPL-SCNC: 22 MMOL/L (ref 21–32)
CREAT SERPL-MCNC: 0.68 MG/DL (ref 0.6–1.3)
EOSINOPHIL # BLD AUTO: 0.08 THOUSAND/ΜL (ref 0–0.61)
EOSINOPHIL NFR BLD AUTO: 1 % (ref 0–6)
ERYTHROCYTE [DISTWIDTH] IN BLOOD BY AUTOMATED COUNT: 14.6 % (ref 11.6–15.1)
GFR SERPL CREATININE-BSD FRML MDRD: 87 ML/MIN/1.73SQ M
GLUCOSE SERPL-MCNC: 98 MG/DL (ref 65–140)
HCT VFR BLD AUTO: 39.5 % (ref 34.8–46.1)
HGB BLD-MCNC: 12.3 G/DL (ref 11.5–15.4)
IMM GRANULOCYTES # BLD AUTO: 0.03 THOUSAND/UL (ref 0–0.2)
IMM GRANULOCYTES NFR BLD AUTO: 0 % (ref 0–2)
LYMPHOCYTES # BLD AUTO: 1.28 THOUSANDS/ΜL (ref 0.6–4.47)
LYMPHOCYTES NFR BLD AUTO: 16 % (ref 14–44)
MCH RBC QN AUTO: 27.8 PG (ref 26.8–34.3)
MCHC RBC AUTO-ENTMCNC: 31.1 G/DL (ref 31.4–37.4)
MCV RBC AUTO: 89 FL (ref 82–98)
MONOCYTES # BLD AUTO: 0.73 THOUSAND/ΜL (ref 0.17–1.22)
MONOCYTES NFR BLD AUTO: 9 % (ref 4–12)
NEUTROPHILS # BLD AUTO: 5.77 THOUSANDS/ΜL (ref 1.85–7.62)
NEUTS SEG NFR BLD AUTO: 73 % (ref 43–75)
NRBC BLD AUTO-RTO: 0 /100 WBCS
PLATELET # BLD AUTO: 158 THOUSANDS/UL (ref 149–390)
PMV BLD AUTO: 10.6 FL (ref 8.9–12.7)
POTASSIUM SERPL-SCNC: 3.9 MMOL/L (ref 3.5–5.3)
RBC # BLD AUTO: 4.43 MILLION/UL (ref 3.81–5.12)
SODIUM SERPL-SCNC: 137 MMOL/L (ref 135–147)
WBC # BLD AUTO: 7.98 THOUSAND/UL (ref 4.31–10.16)

## 2025-02-17 PROCEDURE — C1713 ANCHOR/SCREW BN/BN,TIS/BN: HCPCS | Performed by: STUDENT IN AN ORGANIZED HEALTH CARE EDUCATION/TRAINING PROGRAM

## 2025-02-17 PROCEDURE — 0QSH04Z REPOSITION LEFT TIBIA WITH INTERNAL FIXATION DEVICE, OPEN APPROACH: ICD-10-PCS | Performed by: STUDENT IN AN ORGANIZED HEALTH CARE EDUCATION/TRAINING PROGRAM

## 2025-02-17 PROCEDURE — 27827 TREAT LOWER LEG FRACTURE: CPT | Performed by: STUDENT IN AN ORGANIZED HEALTH CARE EDUCATION/TRAINING PROGRAM

## 2025-02-17 PROCEDURE — 80048 BASIC METABOLIC PNL TOTAL CA: CPT | Performed by: NURSE PRACTITIONER

## 2025-02-17 PROCEDURE — NC001 PR NO CHARGE: Performed by: STUDENT IN AN ORGANIZED HEALTH CARE EDUCATION/TRAINING PROGRAM

## 2025-02-17 PROCEDURE — 73610 X-RAY EXAM OF ANKLE: CPT

## 2025-02-17 PROCEDURE — 27827 TREAT LOWER LEG FRACTURE: CPT

## 2025-02-17 PROCEDURE — C1769 GUIDE WIRE: HCPCS | Performed by: STUDENT IN AN ORGANIZED HEALTH CARE EDUCATION/TRAINING PROGRAM

## 2025-02-17 PROCEDURE — 99232 SBSQ HOSP IP/OBS MODERATE 35: CPT | Performed by: SURGERY

## 2025-02-17 PROCEDURE — 36415 COLL VENOUS BLD VENIPUNCTURE: CPT | Performed by: NURSE PRACTITIONER

## 2025-02-17 PROCEDURE — 85025 COMPLETE CBC W/AUTO DIFF WBC: CPT | Performed by: NURSE PRACTITIONER

## 2025-02-17 DEVICE — 3.5MM CORTEX SCREW SELF-TAPPING 30MM: Type: IMPLANTABLE DEVICE | Site: ANKLE | Status: FUNCTIONAL

## 2025-02-17 DEVICE — 2.7MM VA LCKNG SCREW SLF-TPNG WITH T8 STARDRIVE RECESS 36MM: Type: IMPLANTABLE DEVICE | Status: FUNCTIONAL

## 2025-02-17 DEVICE — 3.5MM CORTEX SCREW SELF-TAPPING 28MM: Type: IMPLANTABLE DEVICE | Site: ANKLE | Status: FUNCTIONAL

## 2025-02-17 DEVICE — 2.7/3.5MM VA-LCP MEDIAL DISTAL TIBIA PLATE/8 HOLES/LEFT
Type: IMPLANTABLE DEVICE | Status: FUNCTIONAL
Brand: VA-LCP

## 2025-02-17 DEVICE — 1.6MM KIRSCHNER WIRE W/TROCAR POINT 150MM: Type: IMPLANTABLE DEVICE | Status: FUNCTIONAL

## 2025-02-17 DEVICE — 3.5MM CANNULATED SCREW FULLY THREADED/44MM: Type: IMPLANTABLE DEVICE | Status: FUNCTIONAL

## 2025-02-17 DEVICE — 2.7MM VA LCKNG SCREW SLF-TPNG WITH T8 STARDRIVE RECESS 42MM: Type: IMPLANTABLE DEVICE | Status: FUNCTIONAL

## 2025-02-17 DEVICE — 1.25MM NON-THREADED GUIDE WIRE 150MM: Type: IMPLANTABLE DEVICE | Status: FUNCTIONAL

## 2025-02-17 DEVICE — 2.7MM VA LCKNG SCREW SLF-TPNG WITH T8 STARDRIVE RECESS 40MM: Type: IMPLANTABLE DEVICE | Site: ANKLE | Status: FUNCTIONAL

## 2025-02-17 RX ORDER — CEFAZOLIN SODIUM 2 G/50ML
2000 SOLUTION INTRAVENOUS
Status: COMPLETED | OUTPATIENT
Start: 2025-02-17 | End: 2025-02-17

## 2025-02-17 RX ORDER — FENTANYL CITRATE/PF 50 MCG/ML
25 SYRINGE (ML) INJECTION
Refills: 0 | Status: DISCONTINUED | OUTPATIENT
Start: 2025-02-17 | End: 2025-02-17 | Stop reason: HOSPADM

## 2025-02-17 RX ORDER — SODIUM CHLORIDE, SODIUM LACTATE, POTASSIUM CHLORIDE, CALCIUM CHLORIDE 600; 310; 30; 20 MG/100ML; MG/100ML; MG/100ML; MG/100ML
INJECTION, SOLUTION INTRAVENOUS CONTINUOUS PRN
Status: DISCONTINUED | OUTPATIENT
Start: 2025-02-17 | End: 2025-02-17

## 2025-02-17 RX ORDER — BUPIVACAINE HYDROCHLORIDE 5 MG/ML
INJECTION, SOLUTION EPIDURAL; INTRACAUDAL
Status: DISCONTINUED | OUTPATIENT
Start: 2025-02-17 | End: 2025-02-17

## 2025-02-17 RX ORDER — LIDOCAINE HYDROCHLORIDE 10 MG/ML
INJECTION, SOLUTION EPIDURAL; INFILTRATION; INTRACAUDAL; PERINEURAL AS NEEDED
Status: DISCONTINUED | OUTPATIENT
Start: 2025-02-17 | End: 2025-02-17

## 2025-02-17 RX ORDER — ONDANSETRON 2 MG/ML
4 INJECTION INTRAMUSCULAR; INTRAVENOUS ONCE AS NEEDED
Status: DISCONTINUED | OUTPATIENT
Start: 2025-02-17 | End: 2025-02-17 | Stop reason: HOSPADM

## 2025-02-17 RX ORDER — PROPOFOL 10 MG/ML
INJECTION, EMULSION INTRAVENOUS AS NEEDED
Status: DISCONTINUED | OUTPATIENT
Start: 2025-02-17 | End: 2025-02-17

## 2025-02-17 RX ORDER — ROCURONIUM BROMIDE 10 MG/ML
INJECTION, SOLUTION INTRAVENOUS AS NEEDED
Status: DISCONTINUED | OUTPATIENT
Start: 2025-02-17 | End: 2025-02-17

## 2025-02-17 RX ORDER — TRANEXAMIC ACID 10 MG/ML
1000 INJECTION, SOLUTION INTRAVENOUS
Status: COMPLETED | OUTPATIENT
Start: 2025-02-17 | End: 2025-02-17

## 2025-02-17 RX ORDER — ONDANSETRON 2 MG/ML
INJECTION INTRAMUSCULAR; INTRAVENOUS AS NEEDED
Status: DISCONTINUED | OUTPATIENT
Start: 2025-02-17 | End: 2025-02-17

## 2025-02-17 RX ORDER — CEFAZOLIN SODIUM 1 G/50ML
1000 SOLUTION INTRAVENOUS EVERY 8 HOURS
Status: COMPLETED | OUTPATIENT
Start: 2025-02-17 | End: 2025-02-18

## 2025-02-17 RX ORDER — FENTANYL CITRATE 50 UG/ML
INJECTION, SOLUTION INTRAMUSCULAR; INTRAVENOUS AS NEEDED
Status: DISCONTINUED | OUTPATIENT
Start: 2025-02-17 | End: 2025-02-17

## 2025-02-17 RX ORDER — VANCOMYCIN HYDROCHLORIDE 1 G/20ML
INJECTION, POWDER, LYOPHILIZED, FOR SOLUTION INTRAVENOUS AS NEEDED
Status: DISCONTINUED | OUTPATIENT
Start: 2025-02-17 | End: 2025-02-17 | Stop reason: HOSPADM

## 2025-02-17 RX ORDER — ALBUTEROL SULFATE 0.83 MG/ML
2.5 SOLUTION RESPIRATORY (INHALATION) ONCE AS NEEDED
Status: DISCONTINUED | OUTPATIENT
Start: 2025-02-17 | End: 2025-02-17 | Stop reason: HOSPADM

## 2025-02-17 RX ORDER — MAGNESIUM HYDROXIDE 1200 MG/15ML
LIQUID ORAL AS NEEDED
Status: DISCONTINUED | OUTPATIENT
Start: 2025-02-17 | End: 2025-02-17 | Stop reason: HOSPADM

## 2025-02-17 RX ORDER — DEXAMETHASONE SODIUM PHOSPHATE 10 MG/ML
INJECTION, SOLUTION INTRAMUSCULAR; INTRAVENOUS AS NEEDED
Status: DISCONTINUED | OUTPATIENT
Start: 2025-02-17 | End: 2025-02-17

## 2025-02-17 RX ADMIN — CEFAZOLIN SODIUM 1000 MG: 1 SOLUTION INTRAVENOUS at 16:58

## 2025-02-17 RX ADMIN — BUPIVACAINE 5 ML: 13.3 INJECTION, SUSPENSION, LIPOSOMAL INFILTRATION at 07:25

## 2025-02-17 RX ADMIN — SUGAMMADEX 150 MG: 100 INJECTION, SOLUTION INTRAVENOUS at 08:53

## 2025-02-17 RX ADMIN — PROPOFOL 30 MG: 10 INJECTION, EMULSION INTRAVENOUS at 08:56

## 2025-02-17 RX ADMIN — SENNOSIDES AND DOCUSATE SODIUM 1 TABLET: 50; 8.6 TABLET ORAL at 21:16

## 2025-02-17 RX ADMIN — LIDOCAINE HYDROCHLORIDE 50 MG: 10 INJECTION, SOLUTION EPIDURAL; INFILTRATION; INTRACAUDAL at 07:35

## 2025-02-17 RX ADMIN — CEFAZOLIN SODIUM 2000 MG: 2 SOLUTION INTRAVENOUS at 07:37

## 2025-02-17 RX ADMIN — SODIUM CHLORIDE, SODIUM LACTATE, POTASSIUM CHLORIDE, AND CALCIUM CHLORIDE: .6; .31; .03; .02 INJECTION, SOLUTION INTRAVENOUS at 07:21

## 2025-02-17 RX ADMIN — FENTANYL CITRATE 50 MCG: 50 INJECTION INTRAMUSCULAR; INTRAVENOUS at 07:23

## 2025-02-17 RX ADMIN — ROCURONIUM 10 MG: 50 INJECTION, SOLUTION INTRAVENOUS at 08:21

## 2025-02-17 RX ADMIN — ACETAMINOPHEN 975 MG: 325 TABLET, FILM COATED ORAL at 13:49

## 2025-02-17 RX ADMIN — ONDANSETRON 4 MG: 2 INJECTION INTRAMUSCULAR; INTRAVENOUS at 08:43

## 2025-02-17 RX ADMIN — ROCURONIUM 50 MG: 50 INJECTION, SOLUTION INTRAVENOUS at 07:36

## 2025-02-17 RX ADMIN — BUPIVACAINE HYDROCHLORIDE 10 ML: 5 INJECTION, SOLUTION EPIDURAL; INTRACAUDAL at 07:23

## 2025-02-17 RX ADMIN — BUPIVACAINE HYDROCHLORIDE 10 ML: 5 INJECTION, SOLUTION EPIDURAL; INTRACAUDAL; PERINEURAL at 07:25

## 2025-02-17 RX ADMIN — TRANEXAMIC ACID 1000 MG: 10 INJECTION, SOLUTION INTRAVENOUS at 07:50

## 2025-02-17 RX ADMIN — ACETAMINOPHEN 975 MG: 325 TABLET, FILM COATED ORAL at 21:15

## 2025-02-17 RX ADMIN — FENTANYL CITRATE 50 MCG: 50 INJECTION INTRAMUSCULAR; INTRAVENOUS at 07:54

## 2025-02-17 RX ADMIN — PROPOFOL 150 MG: 10 INJECTION, EMULSION INTRAVENOUS at 07:35

## 2025-02-17 RX ADMIN — PHENYLEPHRINE HYDROCHLORIDE 20 MCG/MIN: 50 INJECTION INTRAVENOUS at 08:01

## 2025-02-17 RX ADMIN — DEXAMETHASONE SODIUM PHOSPHATE 8 MG: 10 INJECTION INTRAMUSCULAR; INTRAVENOUS at 08:05

## 2025-02-17 RX ADMIN — ENOXAPARIN SODIUM 30 MG: 30 INJECTION SUBCUTANEOUS at 01:39

## 2025-02-17 RX ADMIN — ENOXAPARIN SODIUM 30 MG: 30 INJECTION SUBCUTANEOUS at 13:49

## 2025-02-17 NOTE — ANESTHESIA POSTPROCEDURE EVALUATION
Post-Op Assessment Note    CV Status:  Stable    Pain management: adequate       Mental Status:  Alert   Hydration Status:  Stable   PONV Controlled:  None   Airway Patency:  Patent     Post Op Vitals Reviewed: Yes    No anethesia notable event occurred.    Staff: Anesthesiologist           Last Filed PACU Vitals:  Vitals Value Taken Time   Temp 98.1 °F (36.7 °C) 02/17/25 1001   Pulse 70 02/17/25 1001   /59 02/17/25 1001   Resp 18 02/17/25 1001   SpO2 96 % 02/17/25 1001

## 2025-02-17 NOTE — PROGRESS NOTES
Progress Note - Orthopedics   Name: Delmy Candelario Check 74 y.o. female I MRN: [id number]  Unit/Bed#: OR POOL I Date of Admission: 2/15/2025   Date of Service: 2/17/2025 I Hospital Day: 1    Assessment & Plan  Nondisplaced spiral fracture of shaft of left tibia, initial encounter for closed fracture  Status post comminuted minimally displaced distal tibia fracture, minimally proximal and distal fibula fracture  Set to undergo ORIF left ankle today 2/17/2025  CT left lower extremity shows oblique fracture of the medial tibial metadiaphysis extending towards distal tibia fibula syndesmosis into lateral tibial plafond with intra-articular extension. Minimally displaced fracture at the distal posterior fibula, oblique fracture through proximal fibular diaphysis with slight anterior displacement  NWB LLE in splint  Pain control per primary team  DVT ppx per primary, recommend aspirin 81 Mg twice daily for 6 weeks upon discharge  Patient to follow up with Orthopedic surgery in 2 weeks with Dr. Hinds   Ortho will follow           Subjective   74 y.o.female seen and examined in the preoperative holding area.  Patient reports pain is overall well-controlled on current analgesic regimen at this time.  She denies pain elsewhere besides the left ankle at time of examination.  She offers no additional complaints at this time.  Denies any fevers or chills.  Denies any new numbness or paresthesias in the left lower extremity.    Objective :  Temp:  [97.7 °F (36.5 °C)-98.4 °F (36.9 °C)] 97.7 °F (36.5 °C)  HR:  [76-92] 76  BP: (119-163)/() 139/71  Resp:  [16-18] 16  SpO2:  [96 %-99 %] 99 %  O2 Device: None (Room air)    Physical Exam  Musculoskeletal: Left ankle  Visible skin intact. No erythema or ecchymosis.  No open wounds or lacerations appreciated  Tenderness to palpation diffusely over ankle  Range of motion not assessed due to known fracture  Sensation intact to superficial peroneal, deep peroneal, sural, sinus,  "plantar nerve distributions  Motor intact extensor hallux longus, gastrocnemius muscles, tibialis anterior intact.  Limb is well-perfused  Thigh and calf soft and easily compressible  Brisk capillary refill in all 5 digits      Lab Results: I have reviewed the following results:  Recent Labs     02/15/25  2202 02/16/25  0728 02/17/25  0555   WBC 14.40* 9.82 7.98   HGB 14.0 12.7 12.3   HCT 43.5 39.0 39.5    225 158   BUN 16 17 12   CREATININE 0.74 0.90 0.68   PTT 27  --   --    INR 0.88  --   --      Blood Culture:  No results found for: \"BLOODCX\"  Wound Culture: No results found for: \"WOUNDCULT\"    Imaging Results Review: I personally reviewed the following image studies/reports in PACS and discussed pertinent findings with Radiology: CT left lower. My interpretation of the radiology images/reports is: oblique fracture of the medial tibial metadiaphysis extending towards distal tibia fibula syndesmosis into lateral tibial plafond with intra-articular extension..  Other Study Results Review: No additional pertinent studies reviewed.  "

## 2025-02-17 NOTE — DISCHARGE INSTR - AVS FIRST PAGE
Discharge Instructions - Orthopedics  Delmyobed Candelario Check 74 y.o. female MRN: [id number]  Unit/Bed#: AN CT SCAN    Weight Bearing Status:                                           Nonweightbearing to left lower extremity in high tide Cam boot    DVT prophylaxis:  Discharged on aspirin 81 Mg twice daily for 6 weeks    Pain:  Continue analgesics as directed    Dressing Instructions:   Please keep clean, dry and intact until follow up     Appt Instructions:   If you do not have your appointment, please call the clinic at [phone] to schedule with Dr. Hinds  Otherwise follow up as scheduled.    Contact the office sooner if you experience any increased numbness/tingling in the extremities.      Miscellaneous:  Follow-up in 2 weeks for suture removal and repeat x-ray

## 2025-02-17 NOTE — ANESTHESIA PREPROCEDURE EVALUATION
Procedure:  OPEN REDUCTION W/ INTERNAL FIXATION (ORIF) ANKLE and all associated procedures (Left: Ankle)    Relevant Problems   CARDIO   (+) Hyperlipidemia      GI/HEPATIC   (+) Pharyngoesophageal dysphagia        Physical Exam    Airway    Mallampati score: II         Dental   No notable dental hx     Cardiovascular      Pulmonary      Other Findings  post-pubertal.      Anesthesia Plan  ASA Score- 2     Anesthesia Type- general with ASA Monitors.         Additional Monitors:     Airway Plan: LMA.    Comment: I, Dr. Mckeon, the attending physician, have personally seen and evaluated the patient prior to anesthetic care.  I have reviewed the pre-anesthetic record, and other medical records if appropriate to the anesthetic care.  If a CRNA is involved in the case, I have reviewed the CRNA assessment, if present, and agree.  The patient is in a suitable condition to proceed with my formulated anesthetic plan.  .       Plan Factors-    Chart reviewed.                      Induction- intravenous.    Postoperative Plan-     Perioperative Resuscitation Plan - Level 1 - Full Code.       Informed Consent- Anesthetic plan and risks discussed with patient.  I personally reviewed this patient with the CRNA. Discussed and agreed on the Anesthesia Plan with the CRNA..      NPO Status:  Vitals Value Taken Time   Date of last liquid 02/16/25 02/17/25 0641   Time of last liquid 2330 02/17/25 0641   Date of last solid 02/16/25 02/17/25 0641   Time of last solid 2330 02/17/25 0641

## 2025-02-17 NOTE — PLAN OF CARE
Problem: PAIN - ADULT  Goal: Verbalizes/displays adequate comfort level or baseline comfort level  Description: Interventions:  - Encourage patient to monitor pain and request assistance  - Assess pain using appropriate pain scale  - Administer analgesics based on type and severity of pain and evaluate response  - Implement non-pharmacological measures as appropriate and evaluate response  - Consider cultural and social influences on pain and pain management  - Notify physician/advanced practitioner if interventions unsuccessful or patient reports new pain  Outcome: Progressing     Problem: INFECTION - ADULT  Goal: Absence or prevention of progression during hospitalization  Description: INTERVENTIONS:  - Assess and monitor for signs and symptoms of infection  - Monitor lab/diagnostic results  - Monitor all insertion sites, i.e. indwelling lines, tubes, and drains  - Monitor endotracheal if appropriate and nasal secretions for changes in amount and color  - [place] appropriate cooling/warming therapies per order  - Administer medications as ordered  - Instruct and encourage patient and family to use good hand hygiene technique  - Identify and instruct in appropriate isolation precautions for identified infection/condition  Outcome: Progressing  Goal: Absence of fever/infection during neutropenic period  Description: INTERVENTIONS:  - Monitor WBC    Outcome: Progressing     Problem: INFECTION - ADULT  Goal: Absence of fever/infection during neutropenic period  Description: INTERVENTIONS:  - Monitor WBC    Outcome: Progressing     Problem: SAFETY ADULT  Goal: Patient will remain free of falls  Description: INTERVENTIONS:  - Educate patient/family on patient safety including physical limitations  - Instruct patient to call for assistance with activity   - Consult OT/PT to assist with strengthening/mobility   - Keep Call bell within reach  - Keep bed low and locked with side rails adjusted as appropriate  - Keep  care items and personal belongings within reach  - Initiate and maintain comfort rounds  - Make Fall Risk Sign visible to staff  - Offer Toileting every  Hours, in advance of need  - Initiate/Maintain alarm  - Obtain necessary fall risk management equipme  - Apply yellow socks and bracelet for high fall risk patients  - Consider moving patient to room near nurses station  Outcome: Progressing  Goal: Maintain or return to baseline ADL function  Description: INTERVENTIONS:  -  Assess patient's ability to carry out ADLs; assess patient's baseline for ADL function and identify physical deficits which impact ability to perform ADLs (bathing, care of mouth/teeth, toileting, grooming, dressing, etc.)  - Assess/evaluate cause of self-care deficits   - Assess range of motion  - Assess patient's mobility; develop plan if impaired  - Assess patient's need for assistive devices and provide as appropriate  - Encourage maximum independence but intervene and supervise when necessary  - Involve family in performance of ADLs  - Assess for home care needs following discharge   - Consider OT consult to assist with ADL evaluation and planning for discharge  - Provide patient education as appropriate  Outcome: Progressing  Goal: Maintains/Returns to pre admission functional level  Description: INTERVENTIONS:  - Perform AM-PAC 6 Click Basic Mobility/ Daily Activity assessment daily.  - Set and communicate daily mobility goal to care team and patient/family/caregiver.   - Collaborate with rehabilitation services on mobility goals if consulted  - Perform Range of Motion  times a day.  - Reposition patient every  hours.  - Dangle patient  times a day  - Stand patient  times a day  - Ambulate patient  times a day  - Out of bed to chair  times a day   - Out of bed for meal times a day  - Out of bed for toileting  - Record patient progress and toleration of activity level   Outcome: Progressing

## 2025-02-17 NOTE — ANESTHESIA PROCEDURE NOTES
Peripheral Block    Patient location during procedure: holding area  Start time: 2/17/2025 7:25 AM  Reason for block: at surgeon's request and post-op pain management  Staffing  Performed by: Omid Mckeon MD  Authorized by: Omid Mckeon MD    Preanesthetic Checklist  Completed: patient identified, IV checked, site marked, risks and benefits discussed, surgical consent, monitors and equipment checked, pre-op evaluation and timeout performed  Peripheral Block  Patient position: supine  Prep: ChloraPrep  Patient monitoring: continuous pulse oximetry, frequent blood pressure checks and heart rate  Block type: Adductor Canal  Laterality: left  Injection technique: single-shot  Procedures: ultrasound guided, Ultrasound guidance required for the procedure to increase accuracy and safety of medication placement and decrease risk of complications.  Ultrasound permanent image saved  bupivacaine (PF) (MARCAINE) 0.5 % injection 20 mL - Perineural   10 mL - 2/17/2025 7:25:00 AM  bupivacaine liposomal (EXPAREL) 1.3 % injection 20 mL - Perineural   5 mL - 2/17/2025 7:25:00 AM  Needle  Needle type: Stimuplex   Needle gauge: 22 G  Needle length: 4 in  Needle localization: ultrasound guidance  Needle insertion depth: 4 cm  Assessment  Injection assessment: frequent aspiration, injected with ease, negative aspiration, no symptoms of intraneural/intravenous injection, no paresthesia on injection, negative for heart rate change, needle tip visualized at all times and incremental injection  Paresthesia pain: none  Post-procedure:  site cleaned  patient tolerated the procedure well with no immediate complications

## 2025-02-17 NOTE — PROGRESS NOTES
Progress Note/Post Operative Check- Trauma   Name: Delmy Candelario Check 74 y.o. female I MRN: [id number]  Unit/Bed#: OR POOL I Date of Admission: 2/15/2025   Date of Service: 2/17/2025 I Hospital Day: 1    Assessment & Plan  Nondisplaced spiral fracture of shaft of left tibia, initial encounter for closed fracture  - Left intraarticular distal tibia fracture, present on admission.  - Status post ORIF left distal tibia on 12/17.  - Appreciate Orthopedic surgery evaluation, recommendations and interventions as noted.  - Maintain non weightbearing status on the left lower extremity in CAM boot.  - Monitor Left lower extremity neurovascular exam.  - Continue multimodal analgesic regimen.  - Continue DVT prophylaxis.  - PT and OT evaluation and treatment as indicated.  - Outpatient follow up with Orthopedic surgery for re-evaluation.    Fall  - Status post mechanical slip and fall with the below noted injuries.  - Fall precautions.  - PT and OT evaluation and treatment as indicated.  - Case Management consultation for disposition planning.      Bowel Regimen: Senokot, Miralax  VTE Prophylaxis:VTE covered by:  enoxaparin, Subcutaneous, 30 mg at 02/17/25 0139        Disposition: Continue med/surg status with ongoing medical management and pain control  Please contact the SecureChat role for the Trauma service with any questions/concerns.    24 Hour Events : S/p ORIF left distal tibia   Subjective : Patient reports she feels great, she has no pain.     Objective :  Temp:  [97 °F (36.1 °C)-98.4 °F (36.9 °C)] 97.6 °F (36.4 °C)  HR:  [70-92] 74  BP: (101-163)/() 101/51  Resp:  [16-21] 18  SpO2:  [96 %-100 %] 100 %  O2 Device: None (Room air)    I/O         02/15 0701  02/16 0700 02/16 0701  02/17 0700 02/17 0701 02/18 0700    P.O.   120    I.V. (mL/kg)   750 (10)    IV Piggyback   150    Total Intake(mL/kg)   1020 (13.5)    Urine (mL/kg/hr)   350 (1.1)    Blood   10    Total Output   360    Net   +660            Unmeasured Urine Occurrence  2 x     Unmeasured Stool Occurrence  2 x             Physical Exam  Vitals and nursing note reviewed.   Constitutional:       General: She is not in acute distress.     Appearance: Normal appearance. She is normal weight. She is not ill-appearing or toxic-appearing.   HENT:      Head: Normocephalic and atraumatic.      Nose: Nose normal. No congestion or rhinorrhea.      Mouth/Throat:      Mouth: Mucous membranes are moist.      Pharynx: Oropharynx is clear. No oropharyngeal exudate or posterior oropharyngeal erythema.   Eyes:      Extraocular Movements: Extraocular movements intact.      Conjunctiva/sclera: Conjunctivae normal.      Pupils: Pupils are equal, round, and reactive to light.   Cardiovascular:      Rate and Rhythm: Normal rate and regular rhythm.      Heart sounds: No murmur heard.     No friction rub. No gallop.   Pulmonary:      Effort: Pulmonary effort is normal.      Breath sounds: Normal breath sounds. No wheezing or rhonchi.   Abdominal:      General: Abdomen is flat. Bowel sounds are normal. There is no distension.      Palpations: Abdomen is soft.      Tenderness: There is no abdominal tenderness. There is no guarding or rebound.   Musculoskeletal:      Right shoulder: Normal.      Left shoulder: Normal.      Right upper arm: Normal.      Left upper arm: Normal.      Right elbow: Normal.      Left elbow: Normal.      Right forearm: Normal.      Left forearm: Normal.      Right wrist: Normal.      Left wrist: Normal.      Right hand: Normal.      Left hand: Normal.      Cervical back: Normal range of motion. No rigidity or tenderness.      Thoracic back: No deformity or tenderness.      Lumbar back: No deformity or tenderness.      Right hip: Normal.      Left hip: Normal.      Right upper leg: Normal.      Left upper leg: Normal.      Right knee: Normal.      Left knee: Normal.      Right lower leg: Normal.      Left lower leg: Normal.      Right ankle: Normal.       Left ankle: Decreased range of motion (In CAM boot).      Right foot: Normal.      Left foot: Normal.   Skin:     General: Skin is warm and dry.      Capillary Refill: Capillary refill takes less than 2 seconds.   Neurological:      Mental Status: She is alert.             Lab Results: I have reviewed the following results:  Recent Labs     02/15/25  2202 02/16/25  0728 02/17/25  0555   WBC 14.40* 9.82 7.98   HGB 14.0 12.7 12.3   HCT 43.5 39.0 39.5    225 158   SODIUM 135 136 137   K 3.8 3.8 3.9    107 108   CO2 23 23 22   BUN 16 17 12   CREATININE 0.74 0.90 0.68   GLUC 104 107 98   AST 16 14  --    ALT 14 12  --    ALB 4.3 3.7  --    TBILI 0.61 0.67  --    ALKPHOS 93 82  --    PTT 27  --   --    INR 0.88  --   --

## 2025-02-17 NOTE — ASSESSMENT & PLAN NOTE
Status post comminuted minimally displaced distal tibia fracture, minimally proximal and distal fibula fracture  Set to undergo ORIF left ankle today 2/17/2025  CT left lower extremity shows oblique fracture of the medial tibial metadiaphysis extending towards distal tibia fibula syndesmosis into lateral tibial plafond with intra-articular extension. Minimally displaced fracture at the distal posterior fibula, oblique fracture through proximal fibular diaphysis with slight anterior displacement  NWB LLE in splint  Pain control per primary team  DVT ppx per primary, recommend aspirin 81 Mg twice daily for 6 weeks upon discharge  Patient to follow up with Orthopedic surgery in 2 weeks with Dr. Hinds   Ortho will follow

## 2025-02-17 NOTE — ANESTHESIA PROCEDURE NOTES
Peripheral Block    Patient location during procedure: holding area  Start time: 2/17/2025 7:23 AM  Reason for block: at surgeon's request and post-op pain management  Staffing  Performed by: Omid Mckeon MD  Authorized by: Omid Mckeon MD    Preanesthetic Checklist  Completed: patient identified, IV checked, site marked, risks and benefits discussed, surgical consent, monitors and equipment checked, pre-op evaluation and timeout performed  Peripheral Block  Patient position: right lateral  Prep: ChloraPrep  Patient monitoring: continuous pulse oximetry  Block type: Popliteal  Laterality: left  Injection technique: single-shot  Procedures: ultrasound guided, Ultrasound guidance required for the procedure to increase accuracy and safety of medication placement and decrease risk of complications.  Ultrasound permanent image saved  bupivacaine (PF) (MARCAINE) 0.5 % injection 20 mL - Perineural   10 mL - 2/17/2025 7:23:00 AM  bupivacaine liposomal (EXPAREL) 1.3 % injection 20 mL - Perineural   15 mL - 2/17/2025 7:23:00 AM  Needle  Needle type: Stimuplex   Needle gauge: 22 G  Needle length: 4 in  Needle localization: ultrasound guidance  Needle insertion depth: 4 cm  Assessment  Injection assessment: frequent aspiration, injected with ease, negative aspiration, no paresthesia on injection, no symptoms of intraneural/intravenous injection, negative for heart rate change, needle tip visualized at all times and incremental injection  Paresthesia pain: none  Post-procedure:  site cleaned  patient tolerated the procedure well with no immediate complications

## 2025-02-17 NOTE — ANESTHESIA POSTPROCEDURE EVALUATION
Post-Op Assessment Note    CV Status:  Stable  Pain Score: 0    Pain management: adequate       Mental Status:  Alert and awake   Hydration Status:  Euvolemic and stable   PONV Controlled:  Controlled   Airway Patency:  Patent and adequate     Post Op Vitals Reviewed: Yes    No anethesia notable event occurred.    Staff: CRNA       Last Filed PACU Vitals:  Vitals Value Taken Time   Temp 97.1 °F (36.2 °C) 02/17/25 0915   Pulse 70 02/17/25 0921   /55 02/17/25 0915   Resp 12 02/17/25 0921   SpO2 100 % 02/17/25 0921   Vitals shown include unfiled device data.

## 2025-02-17 NOTE — OP NOTE
OPERATIVE REPORT  PATIENT NAME: Delmy Curiel    :  1950  MRN: [id number]  Pt Location: AN OR ROOM 01    SURGERY DATE: 2025    Surgeon(s) and Role:     * Eloy Hinds,  - Primary     * Oli Brumfield PA-C - Assisting   I was present for the entire procedure., I was present for all critical portions of the procedure., A qualified resident physician was not available., and A physician assistant was required during the procedure for retraction, tissue handling, dissection and suturing.    Preop Diagnosis:  #1 left intra-articular distal tibia fracture    Post-Op Diagnosis:  #1 left intra-articular distal tibia fracture    Procedures:  #1 open reduction internal fixation of left intra-articular distal tibia fracture with fixation of the tibia only      Specimen(s):  None    Estimated Blood Loss:   10 cc    Drains:  None    Anesthesia Type:   General Endotracheal    Operative Indications:  Patient is a 74-year-old female that was on the ice when she had a slip and fall mechanism resulting in a distal one third tibia fracture.  The CT was performed which demonstrated mild displacement of the distal one third tibia shaft fracture with extension into the articular surface of the distal tibia involving a posterior malleolus fragment and anterior lateral fragment which was comminuted and distal.  Discussion was had with the patient about operative versus nonoperative management options.  And tried to ensure decreased risk of nonunions malunions, posttraumatic arthritis or late displacement of the fracture, as well as to begin range of motion exercises quicker the patient was consented for open reduction internal fixation of the left ankle after discussion of risk benefits and alternatives bleeding nonoperative management      Implants:   Synthes medial distal tibial locking plate    Tourniquet time:   Tourniquet was plated 250 mmHg for 61 minutes to the left lower extremity      Complications:   No  acute complications were encountered.  Patient was transferred to PACU in stable condition    Operative findings:  Patient had displacement of her distal one third tibia fracture.  The articular surface was nondisplaced.  After prepping and draping fluoroscopic images were taken which demonstrated increased displacement from preoperative images.  An incision was made medially to allow for subcu cutaneous placement of the medial distal plate.  The posterior malleolus was secured with a K wire and a percutaneous incision was made to place a large pointed reduction clamp on the fracture site to gain compression across the fracture site.  A medial distal tibial locking plate was then slid in a minimally invasive technique and then temporarily held with K wires and then a 3.5 mm lag screw was inserted through the plate across the fracture site.  The plate was then secured distally with multiple 2.7 mm locking screws and proximally with multiple 3.5 mm cortical screws.  A 3.5 mm cannulated screw was inserted from anterior medial to posterior laterally and crossed the posterior malleolus fragment to stabilize the articular surface.  After fixation the ankle joint was stable fracture was stable.    Procedure and Technique:  Patient is a 74-year-old female that was seen examined preoperative holding area.  The operative extremities marked.  All patient's questions were answered.  The patient was then taken back to the operating room and general endotracheal anesthesia was administered by department anesthesia.  The patient was then transferred of the operating table using CT LS spine precautions.  All bony promises well-padded.  A well-padded nonsterile tourniquet was applied to the left upper thigh.  The patient's left lower extremity was then prepped and draped in a standard sterile orthopedic fashion.  Timeouts performed confirm correct site, correct patient, correct procedure.  All in agreement procedure started.   Anatomical landmarks were marked out using fluoroscopic imaging.  The patient's fracture main fragments of the distal one third of the tibial diaphysis and metaphysis had displaced after prepping and draping.  A Esmarch was used to exsanguinate the left lower extremity tourniquet was plated 250 mmHg.  A medial incision was made distally over the planned placement of the distal aspect of the plate.  The saphenous vein was identified and protected anteriorly.  Once dissection was carried down extraperiosteal he a elevator was inserted along the medial border the tibia to allow for placement of the plate.  A Synthes distal tibial plate was then selected and then slid through the distal incision onto the bone proximally.  Its position was confirmed under multiplanar fluoroscopic imaging.  A incision was made proximally to allow for appropriate alignment of the plate and eventual screw fixation.  Surrounding this plate and a guidewire was inserted anterior medially to posterior laterally to secure the posterior malleoli are segment temporarily.  A percutaneously placed pointed reduction clamp was then used to reduce the fracture.  This confirmed under multiplanar fluoroscopic imaging.  A 3.5 mm lag screw was then inserted through the plate and across the fracture site.  Once this was performed the plate was then further secured distally with multiple 2.7 mm locking screws and proximally with multiple 3.5 mm cortical screws.  The clamp was then removed.  An additional 3.5 millimeter screw was inserted across the fracture site.  The wire was into the posterior malleoli are segment was then exchanged for a 3.5 mm cannulated screw with the appropriate length.  After this was performed temporary wire fixation was removed and final reduction implant placement was confirmed on multiplanar fluoroscopic imaging.  The ankle was stressed with dorsiflexion external rotation and found to be stable.  No motion was noted at the  fracture site.  The wound was then copiously irrigated with sterile normal saline.  1 g vancomycin was placed deep in the wounds.  The subcutaneous tissues then repaired using a 2-0 Monocryl suture.  The skin was then closed with 2-0 nylon sutures.  The wounds were then dressed in Adaptic 4 x 4's and cast padding and the patient was placed into a well-padded Ace wrap.  Patient was then placed into a cam boot.  The tourniquet was then let down.  The patient was then transferred off the operating table using CT LS spine precautions extubated and transferred PACU in stable condition        Postoperative plan:  Patient will be nonweightbearing to the left lower extremity.  The patient received 24 hours postoperative antibiotics.  The patient will continue on Lovenox while in the hospital and be discharged on aspirin 81 mg twice daily for 6 weeks for DVT prophylaxis.  The patient should remain in the boot until her 2-week postoperative visit after wound check and suture removal at which point range of motion exercises will be started.    SIGNATURE: Eloy Hinds DO  DATE: February 17, 2025  TIME: 10:21 AM

## 2025-02-17 NOTE — ASSESSMENT & PLAN NOTE
- Status post mechanical slip and fall with the below noted injuries.  - Fall precautions.  - PT and OT evaluation and treatment as indicated.  - Case Management consultation for disposition planning.

## 2025-02-17 NOTE — OCCUPATIONAL THERAPY NOTE
Occupational Therapy Cancellation     02/17/25 1212   OT Last Visit   OT Visit Date 02/17/25   Note Type   Note type Cancelled Session   Additional Comments OT consult received. Chart reviewed. Pt noted to be off unit for ORIF of (L) ankle. Will need updated activity orders post op and then will f/u for OT evaluation as appropriate. Thank you.     Paula Gao, OT

## 2025-02-17 NOTE — ASSESSMENT & PLAN NOTE
- Left intraarticular distal tibia fracture, present on admission.  - Status post ORIF left distal tibia on 12/17.  - Appreciate Orthopedic surgery evaluation, recommendations and interventions as noted.  - Maintain non weightbearing status on the left lower extremity in CAM boot.  - Monitor Left lower extremity neurovascular exam.  - Continue multimodal analgesic regimen.  - Continue DVT prophylaxis.  - PT and OT evaluation and treatment as indicated.  - Outpatient follow up with Orthopedic surgery for re-evaluation.

## 2025-02-18 LAB
ANION GAP SERPL CALCULATED.3IONS-SCNC: 8 MMOL/L (ref 4–13)
BUN SERPL-MCNC: 13 MG/DL (ref 5–25)
CALCIUM SERPL-MCNC: 8.7 MG/DL (ref 8.4–10.2)
CHLORIDE SERPL-SCNC: 106 MMOL/L (ref 96–108)
CO2 SERPL-SCNC: 23 MMOL/L (ref 21–32)
CREAT SERPL-MCNC: 0.73 MG/DL (ref 0.6–1.3)
ERYTHROCYTE [DISTWIDTH] IN BLOOD BY AUTOMATED COUNT: 14.3 % (ref 11.6–15.1)
GFR SERPL CREATININE-BSD FRML MDRD: 81 ML/MIN/1.73SQ M
GLUCOSE SERPL-MCNC: 117 MG/DL (ref 65–140)
HCT VFR BLD AUTO: 37.8 % (ref 34.8–46.1)
HGB BLD-MCNC: 12.1 G/DL (ref 11.5–15.4)
MCH RBC QN AUTO: 27.6 PG (ref 26.8–34.3)
MCHC RBC AUTO-ENTMCNC: 32 G/DL (ref 31.4–37.4)
MCV RBC AUTO: 86 FL (ref 82–98)
PLATELET # BLD AUTO: 210 THOUSANDS/UL (ref 149–390)
PMV BLD AUTO: 10.4 FL (ref 8.9–12.7)
POTASSIUM SERPL-SCNC: 3.6 MMOL/L (ref 3.5–5.3)
RBC # BLD AUTO: 4.39 MILLION/UL (ref 3.81–5.12)
SODIUM SERPL-SCNC: 137 MMOL/L (ref 135–147)
WBC # BLD AUTO: 10.8 THOUSAND/UL (ref 4.31–10.16)

## 2025-02-18 PROCEDURE — NC001 PR NO CHARGE: Performed by: PHYSICIAN ASSISTANT

## 2025-02-18 PROCEDURE — 97167 OT EVAL HIGH COMPLEX 60 MIN: CPT

## 2025-02-18 PROCEDURE — 99232 SBSQ HOSP IP/OBS MODERATE 35: CPT | Performed by: PHYSICIAN ASSISTANT

## 2025-02-18 PROCEDURE — 99024 POSTOP FOLLOW-UP VISIT: CPT | Performed by: PHYSICIAN ASSISTANT

## 2025-02-18 PROCEDURE — 99232 SBSQ HOSP IP/OBS MODERATE 35: CPT | Performed by: SURGERY

## 2025-02-18 PROCEDURE — 80048 BASIC METABOLIC PNL TOTAL CA: CPT | Performed by: PHYSICIAN ASSISTANT

## 2025-02-18 PROCEDURE — 97116 GAIT TRAINING THERAPY: CPT

## 2025-02-18 PROCEDURE — 85027 COMPLETE CBC AUTOMATED: CPT | Performed by: PHYSICIAN ASSISTANT

## 2025-02-18 RX ADMIN — ENOXAPARIN SODIUM 30 MG: 30 INJECTION SUBCUTANEOUS at 16:20

## 2025-02-18 RX ADMIN — ACETAMINOPHEN 975 MG: 325 TABLET, FILM COATED ORAL at 16:20

## 2025-02-18 RX ADMIN — CEFAZOLIN SODIUM 1000 MG: 1 SOLUTION INTRAVENOUS at 04:34

## 2025-02-18 RX ADMIN — ACETAMINOPHEN 975 MG: 325 TABLET, FILM COATED ORAL at 05:47

## 2025-02-18 RX ADMIN — Medication 2000 UNITS: at 10:34

## 2025-02-18 RX ADMIN — ACETAMINOPHEN 975 MG: 325 TABLET, FILM COATED ORAL at 22:48

## 2025-02-18 RX ADMIN — POLYETHYLENE GLYCOL 3350 17 G: 17 POWDER, FOR SOLUTION ORAL at 10:36

## 2025-02-18 RX ADMIN — SENNOSIDES AND DOCUSATE SODIUM 1 TABLET: 50; 8.6 TABLET ORAL at 22:48

## 2025-02-18 RX ADMIN — ENOXAPARIN SODIUM 30 MG: 30 INJECTION SUBCUTANEOUS at 04:34

## 2025-02-18 NOTE — ASSESSMENT & PLAN NOTE
S/P ORIF Left intra-articular distal tibia fracture 2/17/2025 (POD1)  NWB LLE in boot  Pain control per primary team  DVT ppx per primary, recommend Lovenox while inpatient and then recommend aspirin 81 Mg twice daily for 6 weeks upon discharge  Patient to follow up with Orthopedic surgery in 2 weeks with Dr. Hinds   Ortho will follow

## 2025-02-18 NOTE — PHYSICAL THERAPY NOTE
PHYSICAL THERAPY TREATMENT NOTE          Patient Name: Delmy Candelario Check  Today's Date: 2/18/2025          AGE:   74 y.o.  Mrn:   [id number]  ADMIT DX:  Leg pain [M79.606]  Other closed fracture of distal end of left fibula, initial encounter [S82.832A]  Other closed fracture of distal end of left tibia, initial encounter [S82.392A]  Other closed fracture of proximal end of left fibula, initial encounter [S82.832A]  Nondisplaced spiral fracture of shaft of left tibia, initial encounter for closed fracture [S82.245A]    Past Medical History:  Past Medical History:   Diagnosis Date    Diverticulosis 2021    Long term use of drug     RESOLVED: [id number] 02/18/25 1054   PT Last Visit   PT Visit Date 02/18/25   Note Type   Note type Evaluation   Pain Assessment   Pain Assessment Tool 0-10   Pain Score No Pain  (pt denied pain)   Restrictions/Precautions   Weight Bearing Precautions Per Order Yes   LLE Weight Bearing Per Order (S)  NWB  (in high tide CAM boot)   Braces or Orthoses CAM Boot  (LLE)   Other Precautions Chair Alarm;Bed Alarm;WBS;Fall Risk   Home Living   Type of Home House   Home Layout Two level;1/2 bath on main level  (16 total CRYSTAL (6+6+4), pt and daughter report the hill is not accessible (ground too soft/snow covered, not WC or car accessible); once inside daughter's house she can maintain a 1st floor set-up w/ 1 step to access bathroom)   Bathroom Shower/Tub Tub/shower unit   Bathroom Toilet Raised   Bathroom Equipment   (none)   Bathroom Accessibility   (not WC accessible)   Home Equipment   (none)   Additional Comments At baseline pt lives alone in a 3 level house w/ CRYSTAL (more than daughter's house) vs hill, plans to DC to her daughter's house (home set-up above)   Prior Function   Level of [place] Independent with functional mobility;Independent with ADLs;Independent with IADLS   Lives With Alone  (at  baseline)   Receives Help From Family  (daughter lives closeby (pt plan to DC to her daughter's house, daughter will be home but cannot provide physical assistance))   IADLs Independent with driving;Independent with meal prep;Independent with medication management   Falls in the last 6 months 1 to 4  (2 falls on ice day of admission)   Comments At baseline pt amb ind w/o AD   General   Additional Pertinent History Pt admitted 2/15/25 due to fall w/ nondisplaced spiral fx of shaft of L tibia; s/p ORIF L intra-articular distal tibia fx 25 - NWB LLE in boot per Ortho   Family/Caregiver Present Yes  (pt's daughter Marisol)   Cognition   Overall Cognitive Status WFL   Arousal/Participation Cooperative   Orientation Level Oriented X4   Memory Within functional limits   Following Commands Follows all commands and directions without difficulty   Comments Pt ID via name and ; pt agreeable to PT tx and mobility, motivated throughout   Bed Mobility   Supine to Sit 5  Supervision   Additional items Assist x 1;HOB elevated;Bedrails;Verbal cues   Additional Comments VC not to push through LLE while repositioning in bed. pt able to maintain sitting balance at EOB w/ supervision   Transfers   Sit to Stand 5  Supervision   Additional items Assist x 1;Impulsive;Verbal cues  (VC for self pacing, hand placement)   Stand to Sit 5  Supervision   Additional items Assist x 1;Armrests;Increased time required;Verbal cues   Toilet transfer 4  Minimal assistance   Additional items Assist x 1;Increased time required;Raised toilet seat  (commode over toilet, 1 LOB while pulling up underwear while standing in front of toilet requiring Ax1 to correct)   Ambulation/Elevation   Gait pattern Decreased foot clearance;Short stride  (hopping method on RLE to maintain LLE NWB, pt reporting L shoulder pain due to pushing through arms and supporting herself w/ the RW)   Gait Assistance 4  Minimal assist   Additional items Assist x 1;Verbal cues    Assistive Device Rolling walker   Distance 12'+20'   Balance   Static Sitting Fair +   Dynamic Sitting Fair   Static Standing Fair -  (w/ RW)   Dynamic Standing Poor +  (w/ RW)   Ambulatory Poor +  (w/ WR)   Endurance Deficit   Endurance Deficit Yes   Endurance Deficit Description decreased ambulatory endurance/tolerance   Activity Tolerance   Activity Tolerance Patient limited by fatigue   Medical Staff Made Aware Pt benefited from PT/OT care coordination w/ OT Mary due to allow for challenge of pt's activity tolerance, PT and OT goals were addressed individually during session; CM Kian; Trauma AP Hoa   Nurse Made Aware MARY ANN Blackwood   Assessment   Prognosis Good   Problem List Decreased strength;Decreased endurance;Impaired balance;Decreased mobility;Decreased skin integrity;Orthopedic restrictions  (gait deviations)   Assessment PT treatment provided today to address deficits of: impaired balance, gait deviations, and mobility deficits. Pt s/p ORIF L intra-articular distal tibia fx 2/17/25 - NWB LLE in boot per Ortho. Interventions provided include: bed mobility, transfers, gait, and endurance training in order to challenge pt's activity tolerance and to maximize pt independence w/ functional mobility during current admission. Pt continues to require Ax1 for ambulation w/ the RW due to gait deviations and impaired ambulatory balance. Education provided to pt on transfer technique, maintaining LLE NWB. Pt continues to be functioning below baseline level, and remains limited in functional mobility due to orthopedic restrictions, impaired balance, decreased activity tolerance. Pt will continue benefit from PT to promote independence w/ functional mobility, address mobility deficits, and progress towards set goals. Recommend DC w/ level: I (Maximum Rehab Resource Intensity) when medically cleared.   Barriers to Discharge   (pt lives home alone at baseline in a 3 level house, plans to DC to her daughter's house  w/ 1st floor set-up (1 step to access bathroom) and 16 CRYSTAL)   Goals   STG Expiration Date 02/28/25   Short Term Goal #1 Pt will: perform bed mobility w/ mod I to decrease pt's burden of care and increase pt's independence w/ repositioning in bed; perform transfers w/ mod I to promote OOB mobility; ambulate 50' w/ LRAD and mod I to increase pt's ambulatory endurance/tolerance; negotiate 16 stair(s) +/- bumping, AD prn, and supervision to facilitate pt returning to previous living environment; increase all balance ratings by at least 1 grade to decrease pt's risk of falls   PT Treatment Day 2   Plan   Treatment/Interventions Functional transfer training;LE strengthening/ROM;Elevations;Therapeutic exercise;Patient/family training;Endurance training;Equipment eval/education;Bed mobility;Gait training;Compensatory technique education   PT Frequency 3-5x/wk   Discharge Recommendation   Rehab Resource Intensity Level, PT I (Maximum Resource Intensity)   Equipment Recommended Walker   Walker Package Recommended Wheeled walker   Change/add to Walker Package? No   AM-PAC Basic Mobility Inpatient   Turning in Flat Bed Without Bedrails 3   Lying on Back to Sitting on Edge of Flat Bed Without Bedrails 2   Moving Bed to Chair 3   Standing Up From Chair Using Arms 3   Walk in Room 3   Climb 3-5 Stairs With Railing 1   Basic Mobility Inpatient Raw Score 15   Basic Mobility Standardized Score 36.97   [facility] Highest Level Of Mobility   -[facility] Goal 4: Move to chair/commode   -[facility] Achieved 6: Walk 10 steps or more   End of Consult   Patient Position at End of Consult Bedside chair;Bed/Chair alarm activated;All needs within reach         The patient's AM-PAC Basic Mobility Inpatient Short Form Raw Score is 15. A Raw score of less than or equal to 16 suggests the patient may benefit from discharge to post-acute rehabilitation services. Please also refer to the recommendation of the Physical Therapist for safe discharge  planning.    Pt will continue to benefit from skilled inpatient PT during this admission in order to facilitate progress towards goals and to maximize pt's independence w/ functional mobility.    DC rec: level I (Maximum Rehab Resource Intensity)      Serene Meraz, PT, DPT  02/18/25

## 2025-02-18 NOTE — PROGRESS NOTES
"Progress Note - Orthopedics   Name: Delmy Candelario Check 74 y.o. female I MRN: [id number]  Unit/Bed#: W -01 I Date of Admission: 2/15/2025   Date of Service: 2/18/2025 I Hospital Day: 2    Assessment & Plan  Nondisplaced spiral fracture of shaft of left tibia, initial encounter for closed fracture  S/P ORIF Left intra-articular distal tibia fracture 2/17/2025 (POD1)  NWB LLE in boot  Pain control per primary team  DVT ppx per primary, recommend Lovenox while inpatient and then recommend aspirin 81 Mg twice daily for 6 weeks upon discharge  Patient to follow up with Orthopedic surgery in 2 weeks with Dr. Hinds   Ortho will follow        Subjective   74 y.o.female  No acute events, no new complaints. Pain well controlled. Denies fevers, chills, CP, SOB, N/V, numbness or tingling. Patient reports no issues with urination or bowel movements.    Objective :  Temp:  [97 °F (36.1 °C)-98.1 °F (36.7 °C)] 97.8 °F (36.6 °C)  HR:  [64-80] 64  BP: (101-128)/(51-67) 117/66  Resp:  [17-21] 17  SpO2:  [89 %-100 %] 97 %  O2 Device: None (Room air)    Physical Exam  Musculoskeletal: Left lower extremity  Surgical dressings c/d/I without strike through  Thigh and calf soft and compressible.   Motor intact to +FHL/EHL, +ankle dorsi/plantar flexion  Sensation intact to saphenous, sural, tibial, superficial peroneal nerve, and deep peroneal  2+ DP pulse  No calf swelling        Lab Results: I have reviewed the following results:  Recent Labs     02/15/25  2202 02/16/25  0728 02/17/25  0555 02/18/25  0531   WBC 14.40* 9.82 7.98 10.80*   HGB 14.0 12.7 12.3 12.1   HCT 43.5 39.0 39.5 37.8    225 158 210   BUN 16 17 12 13   CREATININE 0.74 0.90 0.68 0.73   PTT 27  --   --   --    INR 0.88  --   --   --      Blood Culture:  No results found for: \"BLOODCX\"  Wound Culture: No results found for: \"WOUNDCULT\"        "

## 2025-02-18 NOTE — CASE MANAGEMENT
Case Management Assessment & Discharge Planning Note    Patient name Delmy Candelario Check  Location W /W -01 MRN [id number]  : 1950 Date 2025       Current Admission Date: 2/15/2025  Current Admission Diagnosis:Nondisplaced spiral fracture of shaft of left tibia, initial encounter for closed fracture   Patient Active Problem List    Diagnosis Date Noted Date Diagnosed    Fall 2025     Nondisplaced spiral fracture of shaft of left tibia, initial encounter for closed fracture 02/15/2025     Prediabetes 2024     Vitamin D deficiency 2024     MINI positive 2021     Dry nose 2021     Mixed rhinitis 01/15/2021     Dysphonia 01/15/2021     Pharyngoesophageal dysphagia 01/15/2021     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     Vocal fold atrophy 01/15/2021     Xerostomia 01/15/2021     Gluten intolerance 01/15/2021     Risk of exposure to Lyme disease 01/15/2021     Hyperlipidemia 10/16/2013       LOS (days): 2  Geometric Mean LOS (GMLOS) (days): 2.8  Days to GMLOS:0.2     OBJECTIVE:    Risk of Unplanned Readmission Score: 8.57         Current admission status: Inpatient       Preferred Pharmacy:   [facility]/pharmacy #[id number] - [address] [address]  Phone: [phone] Fax: [phone]    Primary Care Provider: Santiago Fisher MD    Primary Insurance: MEDICARE  Secondary Insurance: [employer]    ASSESSMENT:  Active Health Care Proxies    There are no active Health Care Proxies on file.                 Readmission Root Cause  30 Day Readmission: No    Patient Information  Admitted from:: Home  Mental Status: Alert  During Assessment patient was accompanied by: Not accompanied during assessment  Assessment information provided by:: Patient  Primary Caregiver: Self  Support Systems: Daughter, Family members  County of Residence: [place]  What city do you  live in?: [place]  Home entry access options. Select all that apply.: Stairs  Do the steps have railings?: No  Type of Current Residence: 2 story home  Upon entering residence, is there a bedroom on the main floor (no further steps)?: No  A bedroom is located on the following floor levels of residence (select all that apply):: 2nd Floor  Upon entering residence, is there a bathroom on the main floor (no further steps)?: No  Indicate which floors of current residence have a bathroom (select all the apply):: 2nd Floor  Number of steps to 2nd floor from main floor: One Flight  Living Arrangements: Lives Alone    Activities of Daily Living Prior to Admission  Functional Status: Independent  Completes ADLs independently?: Yes  Ambulates independently?: Yes  Does patient use assisted devices?: No  Does patient currently own DME?: No  Does the patient have a history of Short-Term Rehab?: No  Does patient have a history of HHC?: No  Does patient currently have HHC?: No     Patient Information Continued  Income Source: Pension/prison  Does patient have prescription coverage?: Yes  Does patient receive dialysis treatments?: No  Does patient have a history of substance abuse?: No  Does patient have a history of Mental Health Diagnosis?: No     Means of Transportation  Means of Transport to Appts:: Drives Self    CM reviewed the availability of treatment team to discuss questions or concerns patient and/or family may have regarding  understanding medications and recognizing signs and symptoms at discharge.  CM also encouraged patient to follow up with all recommended appointments after discharge. CM reviewed the information that will be provided to pt/family on the discharge instructions.  Patient advised of importance for patient and family to participate in managing patient's medical well being.        DISCHARGE DETAILS:    Discharge planning discussed with:: pt  Freedom of Choice: Yes  Comments - Freedom of Choice: CM  met with pt to review the recommendations of the care team for acute rehab.  Pt is agreeable to this as her home is quite old and not easy to navigate.  Her daughter's home is the same way and she is not able to assist pt as much as is needed.     Requested Home Health Care         Is the patient interested in HHC at discharge?: No    DME Referral Provided  Referral made for DME?: No    Other Referral/Resources/Interventions Provided:  Interventions: Acute Rehab, Short Term Rehab  Referral Comments: CM introduced making referrals for acute and subacute rehab.  CM explained the difference and pt requested this be done.  Referrals have been made.  CM will follow up with pt tomorrow regarding where she would like to go for rehab.  She reports [facility] for her and that would be her first choice.  CM will continue to follow to assist with needs and planning.     Treatment Team Recommendation: Acute Rehab, Short Term Rehab  Discharge Destination Plan:: Acute Rehab, Short Term Rehab

## 2025-02-18 NOTE — PLAN OF CARE
Problem: OCCUPATIONAL THERAPY ADULT  Goal: Performs self-care activities at highest level of function for planned discharge setting.  See evaluation for individualized goals.  Description: Treatment Interventions: ADL retraining, Functional transfer training, Patient/family training, Equipment evaluation/education, Compensatory technique education, Continued evaluation, Endurance training  Equipment Recommended: Bedside commode, Shower transfer bench ($$)       See flowsheet documentation for full assessment, interventions and recommendations.   Note: Limitation: Decreased ADL status, Decreased endurance, Decreased self-care trans, Decreased high-level ADLs (trunk control, balance, LLE WBS,)  Prognosis: Good  Assessment: Patient is a 74 y.o. female seen for OT evaluation at [facility] following admission on 2/15/2025  s/p Nondisplaced spiral fracture of shaft of left tibia, initial encounter for closed fracture. Please see above for comprehensive list of comorbidities and significant PMHx impacting functional performance.  Upon initial evaluation, pt appears to be performing below baseline functional status.   Occupational performance is affected by the following deficits: decreased balance , decreased trunk control , and decreased activity tolerance . Personal/Environmental factors impacting D/C include: NWB LLE, inaccessible home environment, Min A needed for ADLs and mobility. Supporting factors include: supportive daughter, (I) PLOF, attitude towards recovery.  Patient would benefit from OT services within the acute care setting to maximize level of functional independence in the following areas self-care transfers, functional mobility, and ADLs.  From OT standpoint, recommendation at time of D/C would be Level 1: maximum resource intensity .     Rehab Resource Intensity Level, OT: I (Maximum Resource Intensity)        Mary Alex, OT

## 2025-02-18 NOTE — PLAN OF CARE
Problem: PHYSICAL THERAPY ADULT  Goal: Performs mobility at highest level of function for planned discharge setting.  See evaluation for individualized goals.  Description: Treatment/Interventions: Functional transfer training, Elevations, LE strengthening/ROM, Therapeutic exercise, Endurance training, Cognitive reorientation, Patient/family training, Equipment eval/education, Gait training, Bed mobility, Spoke to nursing  Equipment Recommended: Walker       See flowsheet documentation for full assessment, interventions and recommendations.  Note: Prognosis: Good  Problem List: Decreased strength, Decreased endurance, Impaired balance, Decreased mobility, Decreased skin integrity, Orthopedic restrictions (gait deviations)  Assessment: PT treatment provided today to address deficits of: impaired balance, gait deviations, and mobility deficits. Pt s/p ORIF L intra-articular distal tibia fx 2/17/25 - NWB LLE in boot per Ortho. Interventions provided include: bed mobility, transfers, gait, and endurance training in order to challenge pt's activity tolerance and to maximize pt independence w/ functional mobility during current admission. Pt continues to require Ax1 for ambulation w/ the RW due to gait deviations and impaired ambulatory balance. Education provided to pt on transfer technique, maintaining LLE NWB. Pt continues to be functioning below baseline level, and remains limited in functional mobility due to orthopedic restrictions, impaired balance, decreased activity tolerance. Pt will continue benefit from PT to promote independence w/ functional mobility, address mobility deficits, and progress towards set goals. Recommend DC w/ level: I (Maximum Rehab Resource Intensity) when medically cleared.  Barriers to Discharge:  (pt lives home alone at baseline in a 3 level house, plans to DC to her daughter's house w/ 1st floor set-up (1 step to access bathroom) and 16 CRYSTAL)     Rehab Resource Intensity Level, PT: I  (Maximum Resource Intensity)    See flowsheet documentation for full assessment.

## 2025-02-18 NOTE — ARC ADMISSION
After review, patient is denied for ARC.  She does not have the medical necessity needed for acute rehab.

## 2025-02-18 NOTE — OCCUPATIONAL THERAPY NOTE
Occupational Therapy Evaluation     Patient Name: Delmy Candelario Check  Today's Date: 2/18/2025  Problem List  Principal Problem:    Nondisplaced spiral fracture of shaft of left tibia, initial encounter for closed fracture  Active Problems:    Fall    Past Medical History  Past Medical History:   Diagnosis Date    Diverticulosis 2021    Long term use of drug     RESOLVED: 17OCT2014     Past Surgical History  Past Surgical History:   Procedure Laterality Date    ORIF TIBIA & FIBULA FRACTURES Left 2/17/2025    Procedure: OPEN REDUCTION W/ INTERNAL FIXATION (ORIF) ANKLE and all associated procedures;  Surgeon: Eloy Hinds DO;  Location: [facility];  Service: Orthopedics             02/18/25 1030   OT Last Visit   OT Visit Date 02/18/25   Note Type   Note type Evaluation   Pain Assessment   Pain Assessment Tool 0-10   Pain Score No Pain   Restrictions/Precautions   Weight Bearing Precautions Per Order Yes   LLE Weight Bearing Per Order NWB  (in high tide CAM boot)   Braces or Orthoses CAM Boot  (LLE)   Other Precautions Chair Alarm;Bed Alarm;Fall Risk;Pain;WBS   Home Living   Type of Home House   Home Layout Two level;Stairs to enter with rails;Able to live on main level with bedroom/bathroom;Performs ADLs on one level  (6 + 6 + 4 CRYSTAL without HR . can maintain FFSU  with 1 step to access bathroom.)   Bathroom Shower/Tub Tub/shower unit   Bathroom Toilet Raised   Bathroom Equipment   (none)   Bathroom Accessibility Not accessible  (W/C and RW would not fit in bathroom.)   Home Equipment   (no AD.)   Additional Comments pt reports that she lives in 3story house that is not accessible. Is able to stay at her daughters house upon DC - however daughters home has 16 steps to enter (vs a very steep hill that pt would not safely be able to push WC up). House is single floor living with 1 step down into bathroom however reports house is NOT W/C accessible.   Prior Function   Level of [place] Independent with  ADLs;Independent with functional mobility;Independent with IADLS   Lives With Alone  (could stay c daughter upon DC)   Receives Help From Family  (local daughter)   IADLs Family/Friend/Other provides transportation;Family/Friend/Other provides meals;Family/Friend/Other provides medication management   Falls in the last 6 months 1 to 4  (2 falls on ice day of admission. denies others)   Vocational Retired  (teacher)   Comments daughter is home throughout day but unable to physically assist at all d/t an injured back.   Lifestyle   Autonomy PTA, pt is (I) c ADLs, IADLs, no AD. Lives alone. Has supportive daughter that can assist upon DC. + driving. Hx of 1 fall   Reciprocal Relationships supportive daughter   Service to Others retired teacher   Intrinsic Gratification being independent   General   Additional Pertinent History Pt admitted s/p fall resulting in nondisplaced spiral fracture of shaft of L tibia. s/p ORIF of distal tib POD1 c Dr Hinds.   Family/Caregiver Present Yes  (daughter present throughout session)   Subjective   Subjective Pt eager for participation, engaged throughout session   ADL   Where Assessed   (toilet, EOB)   Eating Assistance 7  Independent   Grooming Assistance 5  Supervision/Setup   UB Bathing Assistance 5  Supervision/Setup   LB Bathing Assistance 4  Minimal Assistance   UB Dressing Assistance 5  Supervision/Setup   LB Dressing Assistance 4  Minimal Assistance   LB Dressing Deficit Impulsive;Requires assistive device for steadying;Setup;Steadying;Verbal cueing;Supervision/safety;Increased time to complete  (thread BLE into underwear, pulls over hips- lays down to pull over hips via bridging however WBs through LLE and requires cues for correction. in stance while pulling underwear over hips following toileting pt has LOB requiring Min A)   Toileting Assistance  4  Minimal Assistance   Toileting Deficit Bedside commode;Increased time to complete;Supervison/safety;Verbal  cueing;Steadying;Setup  (manages pericare/clothing management, 1 LOB requiring min A correction. BSC over toilet. use of GB)   Functional Assistance 4  Minimal Assistance   Additional Comments Limited by dynamic balance in stance, LLE WBing . cues for safety awareness   Bed Mobility   Supine to Sit 5  Supervision   Additional items Assist x 1;Increased time required;LE management;Comment;Verbal cues   Transfers   Sit to Stand 5  Supervision   Additional items Impulsive;Assist x 1  (VC for self pacing, hand placements)   Stand to Sit 5  Supervision   Additional items Assist x 1;Increased time required;Verbal cues   Toilet transfer 4  Minimal assistance   Additional items Assist x 1;Increased time required;Verbal cues;Commode  (BSC over toilet.)   Additional Comments cueing for self pacing during transfers c fair application. RW used   Functional Mobility   Functional Mobility 4  Minimal assistance   Additional Comments household distances within room x2 trials. cueing for self pacing during directional changes. good compliance c WBS   Additional items Rolling walker   Balance   Static Sitting Fair +   Dynamic Sitting Fair   Static Standing Fair -   Dynamic Standing Fair -   Activity Tolerance   Activity Tolerance Patient limited by fatigue   Medical Staff Made Aware Care coordination c PT Serene. CM Jose. Trauma AP Hoa   Nurse Made Aware MARY ANN Blackwood pre/post session   RUE Assessment   RUE Assessment WFL  (MMT grossly 4+/5 based on functional assessment)   LUE Assessment   LUE Assessment WFL  (MMT grossly 4+/5 based on functional assessment)   Hand Function   Gross Motor Coordination Functional   Fine Motor Coordination Functional   Sensation   Light Touch No apparent deficits   Cognition   Overall Cognitive Status WFL   Arousal/Participation Alert;Cooperative   Attention Within functional limits   Orientation Level Oriented X4   Memory Within functional limits  (good recall of WBS)   Following Commands Follows all  commands and directions without difficulty   Comments Pt agreeable to OT session. good application of learned techniques   Assessment   Limitation Decreased ADL status;Decreased endurance;Decreased self-care trans;Decreased high-level ADLs  (trunk control, balance, LLE WBS,)   Prognosis Good   Assessment Patient is a 74 y.o. female seen for OT evaluation at [facility] following admission on 2/15/2025  s/p Nondisplaced spiral fracture of shaft of left tibia, initial encounter for closed fracture. Please see above for comprehensive list of comorbidities and significant PMHx impacting functional performance.  Upon initial evaluation, pt appears to be performing below baseline functional status.   Occupational performance is affected by the following deficits: decreased balance , decreased trunk control , and decreased activity tolerance . Personal/Environmental factors impacting D/C include: NWB LLE, inaccessible home environment, Min A needed for ADLs and mobility. Supporting factors include: supportive daughter, (I) PLOF, attitude towards recovery.  Patient would benefit from OT services within the acute care setting to maximize level of functional independence in the following areas self-care transfers, functional mobility, and ADLs.  From OT standpoint, recommendation at time of D/C would be Level 1: maximum resource intensity .   Goals   Patient Goals to be independent   Plan   Treatment Interventions ADL retraining;Functional transfer training;Patient/family training;Equipment evaluation/education;Compensatory technique education;Continued evaluation;Endurance training   Goal Expiration Date 02/28/25   OT Treatment Day 0   OT Frequency 3-5x/wk   Discharge Recommendation   Rehab Resource Intensity Level, OT I (Maximum Resource Intensity)   Equipment Recommended Bedside commode;Shower transfer bench ($$)   Commode Type Standard   Additional Comments  The patient's raw score on the AM-PAC Daily  Activity Inpatient Short Form is 19. A raw score of greater than or equal to 19 suggests the patient may benefit from discharge to home. Please refer to the recommendation of the Occupational Therapist for safe discharge planning.   AM-PAC Daily Activity Inpatient   Lower Body Dressing 3   Bathing 3   Toileting 3   Upper Body Dressing 3   Grooming 3   Eating 4   Daily Activity Raw Score 19   Daily Activity Standardized Score (Calc for Raw Score >=11) 40.22   AM-PAC Applied Cognition Inpatient   Following a Speech/Presentation 4   Understanding Ordinary Conversation 4   Taking Medications 4   Remembering Where Things Are Placed or Put Away 4   Remembering List of 4-5 Errands 4   Taking Care of Complicated Tasks 4   Applied Cognition Raw Score 24   Applied Cognition Standardized Score 62.21   End of Consult   Education Provided Yes   Patient Position at End of Consult Bed/Chair alarm activated;All needs within reach;Bedside chair   Nurse Communication Nurse aware of consult   Goals established on initial evaluation in order to achieve pt's goal of being independent again      Pt will complete UB ADLs Mod independent   for increased ADL independence within 10 days.     Pt will complete LB ADLs Mod independent   for increased ADL independence within 10 days.     Pt will complete toileting Mod independent   with use of DME for increased ADL independence within 10 days.     Pt will demonstrate proper body mechanics to complete self-care transfers and functional mobility with Mod independent  and use of LRAD for increased safety and functional independence within 10 days.     Pt will demonstrate standing tolerance of 4 min for increased activity tolerance during ADL/IADL tasks within 10 days.     Pt will demonstrate proper body mechanics and fall prevention strategies during 100% of tx sessions for increased safety awareness during ADL/IADLs    Pt will verbalize and demonstrate understanding of WB status in 100% of tx  sessions for increased safety and functional mobility.     Pt benefited from co-session of skilled OT and PT therapists in order to most appropriately address functional deficits d/t decreased activity tolerance.  OT/PT objectives were addressed separately; please see PT note for specific goal areas targeted.  Mary Alex, OT

## 2025-02-18 NOTE — PROGRESS NOTES
Peripheral Nerve Block Follow-up Note - Acute Pain Service    Delmy Curiel 74 y.o. female MRN: [id number]  Unit/Bed#: W -01 Encounter: [id number]      Assessment:   Principal Problem:    Nondisplaced spiral fracture of shaft of left tibia, initial encounter for closed fracture  Active Problems:    Fall    Delmy Curiel is a 74 y.o. year old female status post ORIF left tibia fracture, POD 1.    Plan:   - Left adductor and popliteal block is functioning appropriately with expected sensory and motor deficits  - Recommend oxycodone 2.5 mg/5 mg PO q4hrs PRN for moderate/severe pain  Dilaudid 0.2 mg IV q2hr PRN for breakthrough pain    - Multimodal analgesia with:  - Tylenol 975 mg PO q8hrs standing    APS will sign off at this time. Thank you for the consult. All opioids and other analgesics to be written at discretion of primary team. Please contact Acute Pain Service - [facility] via [email or web address] / [email or web address] from [phone] with additional questions or concerns. See [email or web address] or [employer] for additional contacts and after hours information.    Pain History  Current pain location(s): No pain  Pain Scale:   No pain  Quality: No pain  24 hour history: Patient is 74-year-old female with left ankle fracture following fall now status post ORIF yesterday.  Had preoperative placement of left adductor canal and popliteal peripheral nerve blocks with Exparel.  Patient continues to have no pain whatsoever in her left lower extremity.  She continues to have dense numbness without tingling and inability to wiggle toes.  Does have minimal sensation to light touch to fourth and fifth toes of the left foot.    Opioid requirement previous 24 hours: None    Meds/Allergies   all current active meds have been reviewed, current meds:   Current Facility-Administered Medications:     acetaminophen (TYLENOL) tablet 975 mg, Q8H HOLLY    bupivacaine liposomal (EXPAREL) 1.3 % injection 20 mL, Once    Cholecalciferol (VITAMIN D3) tablet 2,000  Units, Daily    enoxaparin (LOVENOX) subcutaneous injection 30 mg, Q12H    HYDROmorphone HCl (DILAUDID) injection 0.2 mg, Q2H PRN    multivitamin stress formula tablet 1 tablet, Daily    naloxone (NARCAN) 0.04 mg/mL syringe 0.04 mg, Q1MIN PRN    ondansetron (ZOFRAN) injection 4 mg, Q6H PRN    oxyCODONE (ROXICODONE) split tablet 2.5 mg, Q4H PRN **OR** oxyCODONE (ROXICODONE) IR tablet 5 mg, Q4H PRN    polyethylene glycol (MIRALAX) packet 17 g, Daily    senna-docusate sodium (SENOKOT S) 8.6-50 mg per tablet 1 tablet, HS, and PTA meds:   Prior to Admission Medications   Prescriptions Last Dose Informant Patient Reported? Taking?   Cholecalciferol (Vitamin D) 50 MCG (2000 UT) tablet  Self Yes No   Sig: Take 2,000 Units by mouth daily   Cyanocobalamin (B-12 PO)  Self Yes No   Sig: Take by mouth   Lactobacillus-Inulin (PROBIOTIC DIGESTIVE SUPPORT PO) Not Taking Self Yes No   Sig: Take by mouth   Patient not taking: Reported on 2025   Multiple Vitamin (multivitamin) capsule Past Week Self Yes Yes   Sig: Take 1 capsule by mouth daily   acyclovir (ZOVIRAX) 400 MG tablet   No No   Sig: Take 1 tablet (400 mg total) by mouth 3 (three) times a day   azelastine (ASTELIN) 0.1 % nasal spray Not Taking Self No No   Si spray into each nostril 2 (two) times a day   Patient not taking: Reported on 2024   fluocinolone acetonide (DermOtic) 0.01 % otic oil 2025  No Yes   Sig: Administer 5 drops into both ears 2 (two) times a day   fluticasone (FLONASE) 50 mcg/act nasal spray 2025  No Yes   Si spray into each nostril daily   hydrocortisone 2.5 % ointment Not Taking Self Yes No   Patient not taking: Reported on 10/4/2024   ketoconazole (NIZORAL) 2 % cream Not Taking Self Yes No   Patient not taking: Reported on 10/4/2024   levocetirizine (XYZAL) 5 MG tablet Not Taking  No No   Sig: TAKE 1 TABLET BY MOUTH EVERY DAY IN THE EVENING   Patient not taking: Reported on 2025      Facility-Administered Medications:  None       Allergies   Allergen Reactions    Cat Dander Other (See Comments)     Itchy eyes     Dust Mite Extract Other (See Comments)     Itchy eyes        Objective     Temp:  [97.2 °F (36.2 °C)-97.9 °F (36.6 °C)] 97.8 °F (36.6 °C)  HR:  [64-80] 64  BP: (101-119)/(51-67) 117/66  Resp:  [17-20] 17  SpO2:  [89 %-100 %] 97 %  O2 Device: None (Room air)    Physical Exam  Vitals and nursing note reviewed.   Constitutional:       General: She is awake. She is not in acute distress.     Appearance: She is not ill-appearing, toxic-appearing or diaphoretic.   Skin:     General: Skin is warm and dry.   Neurological:      Mental Status: She is alert and oriented to person, place, and time.      GCS: GCS eye subscore is 4. GCS verbal subscore is 5. GCS motor subscore is 6.      Comments: Unable to wiggle left toes.  Minimal sensation to light touch left fourth and fifth toes.   Psychiatric:         Attention and Perception: Attention normal.         Speech: Speech normal.         Behavior: Behavior normal. Behavior is cooperative.           Lab Results:   Results from last 7 days   Lab Units 02/18/25  0531   WBC Thousand/uL 10.80*   HEMOGLOBIN g/dL 12.1   HEMATOCRIT % 37.8   PLATELETS Thousands/uL 210      Results from last 7 days   Lab Units 02/18/25  0531 02/17/25  0555 02/16/25  0728   POTASSIUM mmol/L 3.6   < > 3.8   CHLORIDE mmol/L 106   < > 107   CO2 mmol/L 23   < > 23   BUN mg/dL 13   < > 17   CREATININE mg/dL 0.73   < > 0.90   CALCIUM mg/dL 8.7   < > 8.7   ALK PHOS U/L  --   --  82   ALT U/L  --   --  12   AST U/L  --   --  14    < > = values in this interval not displayed.       Imaging Studies: Results Review Statement: No pertinent imaging studies reviewed.  EKG, Pathology, and Other Studies: Results Review Statement: No pertinent imaging studies reviewed.    Counseling / Coordination of Care  Total floor / unit time spent today 30 minutes. Greater than 50% of total time was spent with the patient and / or family  counseling and / or coordination of care. A description of the counseling / coordination of care: Patient interview, physical examination, review of medical records, review of imaging and laboratory data, development of pain management plan, discussion of pain management plan with patient and primary service.    Please note that the APS provides consultative services regarding pain management only.  With the exception of ketamine, peripheral nerve catheters, and epidural infusions (and except when indicated), final decisions regarding starting or changing doses of analgesic medications are at the discretion of the consulting service.  Off hours consultation and/or medication management is generally not available.    Jeremias De La Rosa PA-C  Acute Pain Service

## 2025-02-18 NOTE — PROGRESS NOTES
Progress Note - Trauma   Name: Delmy Candelario Check 74 y.o. female I MRN: [id number]  Unit/Bed#: W -01 I Date of Admission: 2/15/2025   Date of Service: 2/18/2025 I Hospital Day: 2    Assessment & Plan  Nondisplaced spiral fracture of shaft of left tibia, initial encounter for closed fracture  - Left intraarticular distal tibia fracture, present on admission.  - Status post ORIF left distal tibia on 12/17.  - Appreciate Orthopedic surgery evaluation, recommendations and interventions as noted.  - Maintain non weightbearing status on the left lower extremity in CAM boot.  - Monitor Left lower extremity neurovascular exam.  - Continue multimodal analgesic regimen.  - Continue DVT prophylaxis.  - PT and OT evaluation and treatment as indicated.  - Outpatient follow up with Orthopedic surgery for re-evaluation.    Fall  - Status post mechanical slip and fall with the below noted injuries.  - Fall precautions.  - PT and OT evaluation and treatment as indicated.  - Case Management consultation for disposition planning.      Bowel Regimen: Miralax, Senokot  VTE Prophylaxis:VTE covered by:  enoxaparin, Subcutaneous, 30 mg at 02/18/25 0434        Disposition: Medically stable for discharge pending PT/OT evaluation for planning Please contact the SecureChat role for the Trauma service with any questions/concerns.    24 Hour Events : No overnight events  Subjective : Patient reports she remains pain free, she still has no mobility or sensation in her foot. She is tolerating her diet and has no complaints.     Objective :  Temp:  [97 °F (36.1 °C)-98.1 °F (36.7 °C)] 97.8 °F (36.6 °C)  HR:  [64-80] 64  BP: (101-128)/(51-67) 117/66  Resp:  [17-21] 17  SpO2:  [89 %-100 %] 97 %  O2 Device: None (Room air)    I/O         02/16 0701  02/17 0700 02/17 0701  02/18 0700 02/18 0701 02/19 0700    P.O.  360     I.V. (mL/kg)  750 (9.3)     IV Piggyback  273     Total Intake(mL/kg)  1383 (17.2)     Urine (mL/kg/hr)  350 (0.2)      Blood  10     Total Output  360     Net  +1023            Unmeasured Urine Occurrence 2 x 3 x     Unmeasured Stool Occurrence 2 x 0 x             Physical Exam  Vitals and nursing note reviewed.   Constitutional:       General: She is not in acute distress.     Appearance: Normal appearance. She is normal weight. She is not ill-appearing or toxic-appearing.   HENT:      Head: Normocephalic and atraumatic.      Nose: Nose normal. No congestion or rhinorrhea.      Mouth/Throat:      Mouth: Mucous membranes are moist.      Pharynx: Oropharynx is clear. No oropharyngeal exudate or posterior oropharyngeal erythema.   Eyes:      Extraocular Movements: Extraocular movements intact.      Conjunctiva/sclera: Conjunctivae normal.      Pupils: Pupils are equal, round, and reactive to light.   Cardiovascular:      Rate and Rhythm: Normal rate and regular rhythm.      Heart sounds: No murmur heard.     No friction rub. No gallop.   Pulmonary:      Effort: Pulmonary effort is normal.      Breath sounds: Normal breath sounds. No wheezing or rhonchi.   Abdominal:      General: Abdomen is flat. Bowel sounds are normal. There is no distension.      Palpations: Abdomen is soft.      Tenderness: There is no abdominal tenderness. There is no guarding or rebound.   Musculoskeletal:      Right shoulder: Normal.      Left shoulder: Normal.      Right upper arm: Normal.      Left upper arm: Normal.      Right elbow: Normal.      Left elbow: Normal.      Right forearm: Normal.      Left forearm: Normal.      Right wrist: Normal.      Left wrist: Normal.      Right hand: Normal.      Left hand: Normal.      Cervical back: Normal range of motion. No rigidity or tenderness.      Thoracic back: No deformity or tenderness.      Lumbar back: No deformity or tenderness.      Right hip: Normal.      Left hip: Normal.      Right upper leg: Normal.      Left upper leg: Normal.      Right knee: Normal.      Left knee: Normal.      Right lower leg:  Normal.      Left lower leg: Normal.      Right ankle: Normal.      Left ankle: Decreased range of motion (In cam boot).      Right foot: Normal.      Left foot: Normal.   Skin:     General: Skin is warm.      Capillary Refill: Capillary refill takes less than 2 seconds.      Coloration: Skin is not pale.      Findings: No bruising.   Neurological:      General: No focal deficit present.      Mental Status: She is alert and oriented to person, place, and time.      Sensory: No sensory deficit.      Motor: No weakness.               Lab Results: I have reviewed the following results:  Recent Labs     02/15/25  2202 02/16/25  0728 02/17/25  0555 02/18/25  0531   WBC 14.40* 9.82   < > 10.80*   HGB 14.0 12.7   < > 12.1   HCT 43.5 39.0   < > 37.8    225   < > 210   SODIUM 135 136   < > 137   K 3.8 3.8   < > 3.6    107   < > 106   CO2 23 23   < > 23   BUN 16 17   < > 13   CREATININE 0.74 0.90   < > 0.73   GLUC 104 107   < > 117   AST 16 14  --   --    ALT 14 12  --   --    ALB 4.3 3.7  --   --    TBILI 0.61 0.67  --   --    ALKPHOS 93 82  --   --    PTT 27  --   --   --    INR 0.88  --   --   --     < > = values in this interval not displayed.

## 2025-02-19 PROCEDURE — 97116 GAIT TRAINING THERAPY: CPT

## 2025-02-19 PROCEDURE — 99232 SBSQ HOSP IP/OBS MODERATE 35: CPT | Performed by: SURGERY

## 2025-02-19 PROCEDURE — 97530 THERAPEUTIC ACTIVITIES: CPT

## 2025-02-19 PROCEDURE — 99024 POSTOP FOLLOW-UP VISIT: CPT | Performed by: STUDENT IN AN ORGANIZED HEALTH CARE EDUCATION/TRAINING PROGRAM

## 2025-02-19 RX ADMIN — SENNOSIDES AND DOCUSATE SODIUM 1 TABLET: 50; 8.6 TABLET ORAL at 21:08

## 2025-02-19 RX ADMIN — Medication 2000 UNITS: at 09:48

## 2025-02-19 RX ADMIN — ACETAMINOPHEN 975 MG: 325 TABLET, FILM COATED ORAL at 05:47

## 2025-02-19 RX ADMIN — ENOXAPARIN SODIUM 30 MG: 30 INJECTION SUBCUTANEOUS at 18:34

## 2025-02-19 RX ADMIN — POLYETHYLENE GLYCOL 3350 17 G: 17 POWDER, FOR SOLUTION ORAL at 09:47

## 2025-02-19 RX ADMIN — ACETAMINOPHEN 975 MG: 325 TABLET, FILM COATED ORAL at 14:33

## 2025-02-19 RX ADMIN — ENOXAPARIN SODIUM 30 MG: 30 INJECTION SUBCUTANEOUS at 05:47

## 2025-02-19 RX ADMIN — Medication 1 TABLET: at 09:48

## 2025-02-19 RX ADMIN — ACETAMINOPHEN 975 MG: 325 TABLET, FILM COATED ORAL at 21:08

## 2025-02-19 NOTE — PHYSICAL THERAPY NOTE
PHYSICAL THERAPY NOTE          Patient Name: Delmy Candelario Check  Today's Date: 25 0905   PT Last Visit   PT Visit Date 25   Note Type   Note Type Treatment   Pain Assessment   Pain Assessment Tool 0-10   Pain Score No Pain   Effect of Pain on Daily Activities limited ambulation distance and steps completed   Patient's Stated Pain Goal No pain   Hospital Pain Intervention(s) Repositioned;Ambulation/increased activity;Elevated;Rest   Multiple Pain Sites No   Restrictions/Precautions   Weight Bearing Precautions Per Order Yes   LLE Weight Bearing Per Order (S)  NWB  (in high tide cam boot)   Braces or Orthoses CAM Boot  (L LE)   Other Precautions Chair Alarm;Bed Alarm;WBS;Fall Risk   General   Chart Reviewed Yes   Response to Previous Treatment Patient with no complaints from previous session.   Family/Caregiver Present No   Cognition   Overall Cognitive Status WFL   Arousal/Participation Alert;Responsive;Cooperative   Attention Within functional limits   Orientation Level Oriented X4   Memory Within functional limits   Following Commands Follows all commands and directions without difficulty   Comments pt ID by name and    Subjective   Subjective pt was agreeable to participate in PT intervention. pt stated no pain pre/post tx session   Bed Mobility   Supine to Sit 5  Supervision   Additional items Assist x 1;HOB elevated;Bedrails;Increased time required;Verbal cues   Sit to Supine Unable to assess   Additional Comments pt seated OOB in elias recliner post tx w/ call bell, legs elevated, chair alarm activated and all pt needs met   Transfers   Sit to Stand 5  Supervision   Additional items Assist x 1;Increased time required;Verbal cues   Stand to Sit 5  Supervision   Additional items Assist x 1;Increased time required;Verbal cues   Stand pivot 5  Supervision   Additional items Assist x  1;Armrests;Increased time required;Verbal cues   Additional Comments pt alos transferred from recliner to WC with /s, no LOB while maintaining her NWB precautions of L LE   Ambulation/Elevation   Gait pattern Decreased foot clearance;Short stride  (pt utilized hopping method in order to mmaintain her NWB precautions of LLE with RW)   Gait Assistance 4  Minimal assist   Additional items Assist x 1;Verbal cues   Assistive Device Rolling walker   Distance 50'x1 RW   Stair Management Assistance 4  Minimal assist   Additional items Assist x 1;Verbal cues;Increased time required   Balance   Static Sitting Fair +   Dynamic Sitting Fair   Static Standing Fair -   Dynamic Standing Poor +   Ambulatory Fair -  (w/ RW)   Endurance Deficit   Endurance Deficit Yes   Endurance Deficit Description limited ambulation distance and steps completed in todays tx session   Activity Tolerance   Activity Tolerance Patient limited by fatigue   Nurse Made Aware Spoke to RN   Assessment   Prognosis Good   Problem List Decreased strength;Decreased endurance;Impaired balance;Decreased mobility;Decreased skin integrity;Orthopedic restrictions   Assessment pt began tx session lying supine in the bed as pt was motivated and agreeable to participate in PT intervention. Pt to focus on bed mobility, transfer training, safety w/ gait, stair trials. In compariosn to previous tx sessions pt continues to complete bed mobility and all functional transfers with RW with /s while maintaining her NWB precautions of L LE. pt continues to be limited with ambulation distance but increased ambulation distance to 50'x1 RW with close /s, no LOB compared to last PT intervention. pt did require 2 standing therapeutic rest breaks while ambulating that 50'x1 RW. pt completed multiple functional transfers in order to increase safety and balance w/ transfers while maintaining her NWB precautions w/o LOB. pt was educated with verbal/visual demonstration on completing  stair trial with bumping technique. pt was able to complete 6 steps in todays tx session while bumping up and down 6 steps. pt required /s for 5 steps and min ax1 for 1 step. Post tx session pt continues to be funcitoning below her baseline level and would benefit from continued skilled pT intervention in order to address deficits listed above and to return pt to her PLOF. Post tx pt in the recliner with call bell, chair alarm activated, legs elevated and all pt needs met   Barriers to Discharge Other (Comment)  (pt lives home alone and has 16 steps to complete to get to second floor.)   Goals   Patient Goals to being independent   STG Expiration Date 02/28/25   PT Treatment Day 3   Plan   Treatment/Interventions Functional transfer training;LE strengthening/ROM;Elevations;Therapeutic exercise;Endurance training;Patient/family training;Equipment eval/education;Bed mobility;Gait training;Spoke to nursing   Progress Progressing toward goals   PT Frequency 3-5x/wk   Discharge Recommendation   Rehab Resource Intensity Level, PT I (Maximum Resource Intensity)   Equipment Recommended Walker   Walker Package Recommended Wheeled walker   Change/add to Walker Package? No   AM-PAC Basic Mobility Inpatient   Turning in Flat Bed Without Bedrails 3   Lying on Back to Sitting on Edge of Flat Bed Without Bedrails 3   Moving Bed to Chair 3   Standing Up From Chair Using Arms 3   Walk in Room 3   Climb 3-5 Stairs With Railing 3   Basic Mobility Inpatient Raw Score 18   Basic Mobility Standardized Score 41.05   [facility] Highest Level Of Mobility   JH-HLM Goal 6: Walk 10 steps or more   JH-HLM Achieved 7: Walk 25 feet or more   Education   Education Provided Mobility training;Assistive device;Other  (stair trials and NWB education)   Patient Demonstrates acceptance/verbal understanding   End of Consult   Patient Position at End of Consult Bedside chair;Bed/Chair alarm activated;All needs within reach   The patient's AM-PAC Basic  Mobility Inpatient Short Form Raw Score is 18. A Raw score of greater than 16 suggests the patient may benefit from discharge to home. Please also refer to the recommendation of the Physical Therapist for safe discharge planning.    Pt continues to be functioning below her baseline level and would benefit from continued skilled PT intervention  Jose George

## 2025-02-19 NOTE — PLAN OF CARE
Problem: PAIN - ADULT  Goal: Verbalizes/displays adequate comfort level or baseline comfort level  Description: Interventions:  - Encourage patient to monitor pain and request assistance  - Assess pain using appropriate pain scale  - Administer analgesics based on type and severity of pain and evaluate response  - Implement non-pharmacological measures as appropriate and evaluate response  - Consider cultural and social influences on pain and pain management  - Notify physician/advanced practitioner if interventions unsuccessful or patient reports new pain  Outcome: Progressing     Problem: INFECTION - ADULT  Goal: Absence or prevention of progression during hospitalization  Description: INTERVENTIONS:  - Assess and monitor for signs and symptoms of infection  - Monitor lab/diagnostic results  - Monitor all insertion sites, i.e. indwelling lines, tubes, and drains  - Monitor endotracheal if appropriate and nasal secretions for changes in amount and color  - [place] appropriate cooling/warming therapies per order  - Administer medications as ordered  - Instruct and encourage patient and family to use good hand hygiene technique  - Identify and instruct in appropriate isolation precautions for identified infection/condition  Outcome: Progressing  Goal: Absence of fever/infection during neutropenic period  Description: INTERVENTIONS:  - Monitor WBC    Outcome: Progressing     Problem: SAFETY ADULT  Goal: Patient will remain free of falls  Description: INTERVENTIONS:  - Educate patient/family on patient safety including physical limitations  - Instruct patient to call for assistance with activity   - Consult OT/PT to assist with strengthening/mobility   - Keep Call bell within reach  - Keep bed low and locked with side rails adjusted as appropriate  - Keep care items and personal belongings within reach  - Initiate and maintain comfort rounds  - Make Fall Risk Sign visible to staff  - Offer Toileting every  Hours,  in advance of need  - Initiate/Maintain alarm  - Obtain necessary fall risk management equipme  - Apply yellow socks and bracelet for high fall risk patients  - Consider moving patient to room near nurses station  Outcome: Progressing  Goal: Maintain or return to baseline ADL function  Description: INTERVENTIONS:  -  Assess patient's ability to carry out ADLs; assess patient's baseline for ADL function and identify physical deficits which impact ability to perform ADLs (bathing, care of mouth/teeth, toileting, grooming, dressing, etc.)  - Assess/evaluate cause of self-care deficits   - Assess range of motion  - Assess patient's mobility; develop plan if impaired  - Assess patient's need for assistive devices and provide as appropriate  - Encourage maximum independence but intervene and supervise when necessary  - Involve family in performance of ADLs  - Assess for home care needs following discharge   - Consider OT consult to assist with ADL evaluation and planning for discharge  - Provide patient education as appropriate  Outcome: Progressing  Goal: Maintains/Returns to pre admission functional level  Description: INTERVENTIONS:  - Perform AM-PAC 6 Click Basic Mobility/ Daily Activity assessment daily.  - Set and communicate daily mobility goal to care team and patient/family/caregiver.   - Collaborate with rehabilitation services on mobility goals if consulted  - Perform Range of Motion  times a day.  - Reposition patient every  hours.  - Dangle patient  times a day  - Stand patient  times a day  - Ambulate patient  times a day  - Out of bed to chair  times a day   - Out of bed for meal times a day  - Out of bed for toileting  - Record patient progress and toleration of activity level   Outcome: Progressing     Problem: DISCHARGE PLANNING  Goal: Discharge to home or other facility with appropriate resources  Description: INTERVENTIONS:  - Identify barriers to discharge w/patient and caregiver  - Arrange for needed  discharge resources and transportation as appropriate  - Identify discharge learning needs (meds, wound care, etc.)  - Arrange for interpretive services to assist at discharge as needed  - Refer to Case Management Department for coordinating discharge planning if the patient needs post-hospital services based on physician/advanced practitioner order or complex needs related to functional status, cognitive ability, or social support system  Outcome: Progressing

## 2025-02-19 NOTE — PROGRESS NOTES
"Progress Note - Orthopedics   Name: Delmy Candelario Check 74 y.o. female I MRN: [id number]  Unit/Bed#: W -01 I Date of Admission: 2/15/2025   Date of Service: 2/19/2025 I Hospital Day: 3    Assessment & Plan  Nondisplaced spiral fracture of shaft of left tibia, initial encounter for closed fracture  S/p ORIF of left intra-articular distal tibia fracture with fixation of tibia only with Dr. Hinds 2/17/2025 (POD 2).   Non weight bearing to the left lower extremity in camboot.   Pain control per primary team.   PT/OT  Monitor for s/s ABLA, transfuse for hgb less than 7, per primary team.   Medical management per primary team.   DVT ppx: lovenox while admitted, recommend aspirin 81mg BID x 6 weeks on d/c.   Should follow up with Dr. Hinds upon discharge.     Ok for discharge from Orthopedics service perspective.  Orthopedics service will follow.  Please contact the SecureChat role for the Orthopedics service with any questions/concerns.    Subjective   74 y.o.female seen and examined at bedside. States that pain is well controlled. No current complaints. Block working well. Worked with PT this morning.     Objective :  Temp:  [97.5 °F (36.4 °C)-98.6 °F (37 °C)] 97.8 °F (36.6 °C)  HR:  [] 71  BP: (108-138)/(62-78) 108/62  Resp:  [18] 18  SpO2:  [93 %-99 %] 98 %  O2 Device: None (Room air)    Physical Exam  Musculoskeletal: Left lower extremity  Surgical dressings c/d/I without strike through. Camboot in place.   Proximal calf soft and compressible.   Motor intact to +FHL/EHL  Sensation intact to saphenous, sural, tibial, superficial peroneal nerve, and deep peroneal.   No calf swelling or tenderness to palpation      Lab Results: I have reviewed the following results:  Recent Labs     02/17/25  0555 02/18/25  0531   WBC 7.98 10.80*   HGB 12.3 12.1   HCT 39.5 37.8    210   BUN 12 13   CREATININE 0.68 0.73     Blood Culture:  No results found for: \"BLOODCX\"  Wound Culture: No results found for: " "\"WOUNDCULT\"    Imaging Results Review: No pertinent imaging studies reviewed.  Other Study Results Review: No additional pertinent studies reviewed.  "

## 2025-02-19 NOTE — CASE MANAGEMENT
Case Management Discharge Planning Note    Patient name Delmy Candelario Check  Location W /W -01 MRN [id number]  : 1950 Date 2025       Current Admission Date: 2/15/2025  Current Admission Diagnosis:Nondisplaced spiral fracture of shaft of left tibia, initial encounter for closed fracture   Patient Active Problem List    Diagnosis Date Noted Date Diagnosed    Fall 2025     Nondisplaced spiral fracture of shaft of left tibia, initial encounter for closed fracture 02/15/2025     Prediabetes 2024     Vitamin D deficiency 2024     MINI positive 2021     Dry nose 2021     Mixed rhinitis 01/15/2021     Dysphonia 01/15/2021     Pharyngoesophageal dysphagia 01/15/2021     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     Vocal fold atrophy 01/15/2021     Xerostomia 01/15/2021     Gluten intolerance 01/15/2021     Risk of exposure to Lyme disease 01/15/2021     Hyperlipidemia 10/16/2013       LOS (days): 3  Geometric Mean LOS (GMLOS) (days): 4.2  Days to GMLOS:0.6     OBJECTIVE:  Risk of Unplanned Readmission Score: 8.1         Current admission status: Inpatient   Preferred Pharmacy:   [facility]/pharmacy #[id number] [address] [address]  Phone: [phone] Fax: [phone]    Primary Care Provider: Santiago Fisher MD    Primary Insurance: MEDICARE  Secondary Insurance: [facility] HEALTH OPTIONS PROGRAM    DISCHARGE DETAILS:     [facility] has denied pt due to lack of medical necessity for acute setting.  CM met with pt to review the list of subacute facilities CM had provided to her earlier in the day.      Pt would like to go to [facility] for rehab.  They have been reserved in [place].  CM following up to determine if they are able to accept pt today.  No response from the facility.    Cm will follow up tomorrow AM.

## 2025-02-19 NOTE — ASSESSMENT & PLAN NOTE
- Status post mechanical slip and fall with the below noted injuries.  - Fall precautions.  - PT and OT evaluation and treatment as indicated.  - Case Management consultation for disposition planning. Rehab placement pending.

## 2025-02-19 NOTE — ASSESSMENT & PLAN NOTE
S/p ORIF of left intra-articular distal tibia fracture with fixation of tibia only with Dr. Hinds 2/17/2025 (POD 2).   Non weight bearing to the left lower extremity in camboot.   Pain control per primary team.   PT/OT  Monitor for s/s ABLA, transfuse for hgb less than 7, per primary team.   Medical management per primary team.   DVT ppx: lovenox while admitted, recommend aspirin 81mg BID x 6 weeks on d/c.   Should follow up with Dr. Hinds upon discharge.

## 2025-02-19 NOTE — PLAN OF CARE
Problem: PHYSICAL THERAPY ADULT  Goal: Performs mobility at highest level of function for planned discharge setting.  See evaluation for individualized goals.  Description: Treatment/Interventions: Functional transfer training, Elevations, LE strengthening/ROM, Therapeutic exercise, Endurance training, Cognitive reorientation, Patient/family training, Equipment eval/education, Gait training, Bed mobility, Spoke to nursing  Equipment Recommended: Walker       See flowsheet documentation for full assessment, interventions and recommendations.  Outcome: Progressing  Note: Prognosis: Good  Problem List: Decreased strength, Decreased endurance, Impaired balance, Decreased mobility, Decreased skin integrity, Orthopedic restrictions  Assessment: pt began tx session lying supine in the bed as pt was motivated and agreeable to participate in PT intervention. Pt to focus on bed mobility, transfer training, safety w/ gait, stair trials. In compariosn to previous tx sessions pt continues to complete bed mobility and all functional transfers with RW with /s while maintaining her NWB precautions of L LE. pt continues to be limited with ambulation distance but increased ambulation distance to 50'x1 RW with close /s, no LOB compared to last PT intervention. pt did require 2 standing therapeutic rest breaks while ambulating that 50'x1 RW. pt completed multiple functional transfers in order to increase safety and balance w/ transfers while maintaining her NWB precautions w/o LOB. pt was educated with verbal/visual demonstration on completing stair trial with bumping technique. pt was able to complete 6 steps in todays tx session while bumping up and down 6 steps. pt required /s for 5 steps and min ax1 for 1 step. Post tx session pt continues to be funcitoning below her baseline level and would benefit from continued skilled pT intervention in order to address deficits listed above and to return pt to her PLOF. Post tx pt in the  recliner with call bell, chair alarm activated, legs elevated and all pt needs met  Barriers to Discharge: Other (Comment) (pt lives home alone and has 16 steps to complete to get to second floor.)     Rehab Resource Intensity Level, PT: I (Maximum Resource Intensity)    See flowsheet documentation for full assessment.

## 2025-02-19 NOTE — CASE MANAGEMENT
Case Management Discharge Planning Note    Patient name Delmy Candelario Check  Location W /W -01 MRN [id number]  : 1950 Date 2025       Current Admission Date: 2/15/2025  Current Admission Diagnosis:Nondisplaced spiral fracture of shaft of left tibia, initial encounter for closed fracture   Patient Active Problem List    Diagnosis Date Noted Date Diagnosed    Fall 2025     Nondisplaced spiral fracture of shaft of left tibia, initial encounter for closed fracture 02/15/2025     Prediabetes 2024     Vitamin D deficiency 2024     MINI positive 2021     Dry nose 2021     Mixed rhinitis 01/15/2021     Dysphonia 01/15/2021     Pharyngoesophageal dysphagia 01/15/2021     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     Vocal fold atrophy 01/15/2021     Xerostomia 01/15/2021     Gluten intolerance 01/15/2021     Risk of exposure to Lyme disease 01/15/2021     Hyperlipidemia 10/16/2013       LOS (days): 3  Geometric Mean LOS (GMLOS) (days): 2.8  Days to GMLOS:-0.6     OBJECTIVE:  Risk of Unplanned Readmission Score: 8.46         Current admission status: Inpatient   Preferred Pharmacy:   [facility]/pharmacy #[id number] [address] [address]  Phone: [phone] Fax: [phone]    Primary Care Provider: Santiago Fisher MD    Primary Insurance: MEDICARE  Secondary Insurance: [facility] HEALTH OPTIONS PROGRAM    DISCHARGE DETAILS:        CM met with pt to review [facility] has not yet gotten back to me regarding whether they are able to accept her for acute rehab however [facility] is not able to accept her as she does not have the medical necessity for acute.  CM provided pt with a list of subacute facilities to review in the event acute is denied.  Pt will review this list and choose a facility in the vent [facility] is not able to accept her.    Pt is stable for DC.  CM will continue to follow to assist with  rehab needs and planning.

## 2025-02-19 NOTE — PROGRESS NOTES
Progress Note - Trauma   Name: Delmy Candelario Check 74 y.o. female I MRN: [id number]  Unit/Bed#: W -01 I Date of Admission: 2/15/2025   Date of Service: 2/19/2025 I Hospital Day: 3    Assessment & Plan  Fall  - Status post mechanical slip and fall with the below noted injuries.  - Fall precautions.  - PT and OT evaluation and treatment as indicated.  - Case Management consultation for disposition planning. Rehab placement pending.     Nondisplaced spiral fracture of shaft of left tibia, initial encounter for closed fracture  - Left intraarticular distal tibia fracture, present on admission.  - Status post ORIF left distal tibia on 12/17.  - Appreciate Orthopedic surgery evaluation, recommendations and interventions as noted.  - Maintain non weightbearing status on the left lower extremity in CAM boot.  - Monitor Left lower extremity neurovascular exam.  - Continue multimodal analgesic regimen.  - Continue DVT prophylaxis.  - PT and OT evaluation and treatment as indicated.  - Outpatient follow up with Orthopedic surgery for re-evaluation.      Bowel Regimen: senokot, miralax  VTE Prophylaxis:VTE covered by:  enoxaparin, Subcutaneous, 30 mg at 02/19/25 0547    and Sequential compression device (Venodyne)      Disposition: continue med-surg status, rehab placement pendign Please contact the SecureChat role for the Trauma service with any questions/concerns.    24 Hour Events : No acute events  Subjective : Patient's pain is controlled. She is motivated to go to rehab. No new complaints.     Objective :  Temp:  [97.5 °F (36.4 °C)-98.6 °F (37 °C)] 97.9 °F (36.6 °C)  HR:  [] 81  BP: (102-138)/(59-78) 102/59  Resp:  [18] 18  SpO2:  [93 %-99 %] 98 %  O2 Device: None (Room air)    I/O         02/17 0701  02/18 0700 02/18 0701 02/19 0700 02/19 0701 02/20 0700    P.O. 360 840 480    I.V. (mL/kg) 750 (9.3)      IV Piggyback 273      Total Intake(mL/kg) 1383 (17.2) 840 (10.4) 480 (6)    Urine (mL/kg/hr) 350  (0.2) 2 (0)     Stool  0     Blood 10      Total Output 360 2     Net +1023 +838 +480           Unmeasured Urine Occurrence 3 x 2 x     Unmeasured Stool Occurrence 0 x 0 x             Physical Exam  Constitutional:       Appearance: Normal appearance.   HENT:      Head: Normocephalic.      Nose: Nose normal.      Mouth/Throat:      Mouth: Mucous membranes are moist.   Eyes:      Pupils: Pupils are equal, round, and reactive to light.   Cardiovascular:      Rate and Rhythm: Normal rate and regular rhythm.      Pulses: Normal pulses.   Pulmonary:      Effort: Pulmonary effort is normal. No respiratory distress.      Breath sounds: Normal breath sounds.   Abdominal:      General: Abdomen is flat.      Palpations: Abdomen is soft.      Tenderness: There is no abdominal tenderness.   Musculoskeletal:         General: Normal range of motion.      Cervical back: Normal range of motion and neck supple. No tenderness.      Comments: + LLE in CAM boot   Skin:     General: Skin is warm and dry.   Neurological:      General: No focal deficit present.      Mental Status: She is alert and oriented to person, place, and time.      Sensory: No sensory deficit.      Motor: No weakness.   Psychiatric:         Behavior: Behavior normal.               Lab Results: I have reviewed the following results:  Recent Labs     02/18/25  0531   WBC 10.80*   HGB 12.1   HCT 37.8      SODIUM 137   K 3.6      CO2 23   BUN 13   CREATININE 0.73   GLUC 117       Imaging Results Review: No pertinent imaging studies reviewed.  Other Study Results Review: No additional pertinent studies reviewed.

## 2025-02-20 VITALS
OXYGEN SATURATION: 97 % | HEART RATE: 78 BPM | DIASTOLIC BLOOD PRESSURE: 68 MMHG | RESPIRATION RATE: 16 BRPM | TEMPERATURE: 97.4 F | HEIGHT: 66 IN | BODY MASS INDEX: 28.03 KG/M2 | SYSTOLIC BLOOD PRESSURE: 128 MMHG | WEIGHT: 174.38 LBS

## 2025-02-20 PROCEDURE — 99024 POSTOP FOLLOW-UP VISIT: CPT | Performed by: PHYSICIAN ASSISTANT

## 2025-02-20 PROCEDURE — 99238 HOSP IP/OBS DSCHRG MGMT 30/<: CPT | Performed by: PHYSICIAN ASSISTANT

## 2025-02-20 RX ORDER — AMOXICILLIN 250 MG
1 CAPSULE ORAL
Start: 2025-02-20

## 2025-02-20 RX ORDER — OXYCODONE HYDROCHLORIDE 5 MG/1
5 TABLET ORAL EVERY 4 HOURS PRN
Qty: 20 TABLET | Refills: 0 | Status: SHIPPED | OUTPATIENT
Start: 2025-02-20 | End: 2025-03-02

## 2025-02-20 RX ORDER — ASPIRIN 81 MG/1
81 TABLET ORAL 2 TIMES DAILY
Qty: 84 TABLET | Refills: 0 | Status: SHIPPED | OUTPATIENT
Start: 2025-02-20 | End: 2025-04-03

## 2025-02-20 RX ORDER — ACETAMINOPHEN 325 MG/1
975 TABLET ORAL EVERY 8 HOURS SCHEDULED
Start: 2025-02-20

## 2025-02-20 RX ORDER — POLYETHYLENE GLYCOL 3350 17 G/17G
17 POWDER, FOR SOLUTION ORAL DAILY
Start: 2025-02-21

## 2025-02-20 RX ADMIN — ACETAMINOPHEN 975 MG: 325 TABLET, FILM COATED ORAL at 06:44

## 2025-02-20 RX ADMIN — Medication 2000 UNITS: at 08:02

## 2025-02-20 RX ADMIN — POLYETHYLENE GLYCOL 3350 17 G: 17 POWDER, FOR SOLUTION ORAL at 08:02

## 2025-02-20 RX ADMIN — ENOXAPARIN SODIUM 30 MG: 30 INJECTION SUBCUTANEOUS at 17:04

## 2025-02-20 RX ADMIN — ENOXAPARIN SODIUM 30 MG: 30 INJECTION SUBCUTANEOUS at 06:44

## 2025-02-20 RX ADMIN — Medication 1 TABLET: at 08:02

## 2025-02-20 NOTE — ASSESSMENT & PLAN NOTE
S/p ORIF of left intra-articular distal tibia fracture with fixation of tibia only with Dr. Hinds 2/17/2025 (POD 3).   Non weight bearing to the left lower extremity in camboot.   Pain control per primary team.   PT/OT  Monitor for s/s ABLA, transfuse for hgb less than 7, per primary team.   Medical management per primary team.   DVT ppx: lovenox while admitted, recommend aspirin 81mg BID x 6 weeks on d/c.   Should follow up with Dr. Hinds upon discharge.

## 2025-02-20 NOTE — DISCHARGE SUMMARY
"Discharge Summary - Trauma   Name: Delmy Curiel 74 y.o. female I MRN: [id number]  Unit/Bed#: W -01 I Date of Admission: 2/15/2025   Date of Service: 2/20/2025 I Hospital Day: 4    Assessment & Plan  Fall  - Status post mechanical slip and fall with the below noted injuries.  - Fall precautions.  - PT and OT evaluation and treatment as indicated.  - Case Management consultation for disposition planning. D/c to [facility].     Nondisplaced spiral fracture of shaft of left tibia, initial encounter for closed fracture  - Left intraarticular distal tibia fracture, present on admission.  - Status post ORIF left distal tibia on 12/17.  - Appreciate Orthopedic surgery evaluation, recommendations and interventions as noted.  - Maintain non weightbearing status on the left lower extremity in CAM boot.  - Monitor Left lower extremity neurovascular exam.  - Continue multimodal analgesic regimen.  - Continue DVT prophylaxis.  - PT and OT evaluation and treatment as indicated.  - Outpatient follow up with Orthopedic surgery for re-evaluation.      Admission Date: 2/15/2025 2006  Discharge Date: 02/20/25  Admitting Diagnosis: Leg pain [M79.606]  Other closed fracture of distal end of left fibula, initial encounter [S82.832A]  Other closed fracture of distal end of left tibia, initial encounter [S82.392A]  Other closed fracture of proximal end of left fibula, initial encounter [S82.832A]  Nondisplaced spiral fracture of shaft of left tibia, initial encounter for closed fracture [S82.245A]  Discharge Diagnosis:   Medical Problems       Resolved Problems  Date Reviewed: 2/20/2025   None         HPI: As documented by Dr. Renteria who evaluated the patient on admission, \" 74 y.o. female who presents to the ED after a fall while shoveling her walkway. Patient denies headstrike or LOC. She does not take antiplatelet/anticoagulation therapy at this time. She is having pain in her left leg and was unable to ambulate " "after the incident. No other acute concerns at this time. Denies chest pain, SOB, cough, abdominal pain, n/v/d, fever, chills, dizziness, lightheadedness, HA, dysuria, hematuria, hematochezia, or melena.\"     Procedures Performed:   Orders Placed This Encounter   Procedures    Splint application       Summary of Hospital Course:  Patient was placed on the trauma service following fall when she sustained a left tibial and fibular fracture. She was seen by orthopedics and taken to the OR on 2/17 for ORIF L ankle. Post op she is NWB in a CAM boot. She is on lovenox for DVT ppx. Her pain is controlled. She was up with PT/OT who recommended inpatient rehab. She is medically stable and approved for SNF rehab. She will d/c to [facility] today. She will d/c on ASA 81 mg BID for 6 weeks for DVT ppx. She will f/u with orthopedics in 2 weeks.     Significant Findings, Care, Treatment and Services Provided:   XR ankle 3+ vw left  Result Date: 2/17/2025  Impression: Fluoroscopy provided for procedure guidance. Please refer to the separate procedure note for additional details. Workstation performed: [id number]     XR ankle 3+ views LEFT  Result Date: 2/16/2025  Impression: Oblique, comminuted fracture through the distal tibia with intra-articular extension and mild displacement. Oblique comminuted fracture of the proximal fibula with mild varus angulation. Minimally displaced distal fibular fracture. Computerized Assisted Algorithm (CAA) may have been used to analyze all applicable images. Resident: Tejinder Yancey I, the attending radiologist, have reviewed the images and agree with the final report above. Workstation performed: [id number]     XR tibia fibula 2 views LEFT  Result Date: 2/16/2025  Impression: Oblique, comminuted fracture through the distal tibia with intra-articular extension and mild displacement. Oblique comminuted fracture of the proximal fibula with mild varus angulation. Minimally displaced distal " "fibular fracture. Computerized Assisted Algorithm (CAA) may have been used to analyze all applicable images. Resident: Tejinder Yancey I, the attending radiologist, have reviewed the images and agree with the final report above. Workstation performed: [id number]     XR chest 1 view portable  Result Date: 2/16/2025  Impression: No acute cardiopulmonary disease. Resident: Tejinder Yancey I, the attending radiologist, have reviewed the images and agree with the final report above. Workstation performed: [id number]     CT lower extremity wo contrast left  Result Date: 2/16/2025  Impression: 1.  Intra-articular and comminuted distal tibial fracture 2.  Proximal and distal fibular fractures Workstation performed: [id number]         Complications: none    Discharge Day Visit / Exam:   /59   Pulse 74   Temp 97.8 °F (36.6 °C)   Resp 18   Ht 5' 6\" (1.676 m)   Wt 79.1 kg (174 lb 6.1 oz)   SpO2 96%   BMI 28.15 kg/m²   Physical Exam  Constitutional:       Appearance: Normal appearance.   HENT:      Head: Normocephalic.      Nose: Nose normal.      Mouth/Throat:      Mouth: Mucous membranes are moist.   Cardiovascular:      Rate and Rhythm: Normal rate and regular rhythm.      Pulses: Normal pulses.   Pulmonary:      Effort: Pulmonary effort is normal. No respiratory distress.      Breath sounds: Normal breath sounds.   Abdominal:      General: Abdomen is flat.      Palpations: Abdomen is soft.      Tenderness: There is no abdominal tenderness.   Musculoskeletal:      Cervical back: Normal range of motion and neck supple. No tenderness.      Comments: + LLE in CAM boot; NVI distally in LLE   Skin:     General: Skin is warm and dry.   Neurological:      General: No focal deficit present.      Mental Status: She is alert and oriented to person, place, and time.      Sensory: No sensory deficit.      Motor: No weakness.   Psychiatric:         Behavior: Behavior normal.            Condition at Discharge: good "       Discharge instructions/Information to patient and family:   See After Visit Summary (AVS) for information provided to patient and family.      Provisions for Follow-Up Care:  See after visit summary for information related to follow-up care and any pertinent home health orders.      PCP: Santiago Fisher MD    Disposition: SNF at [facility]    Planned Readmission: No     Discharge Medications:  See after visit summary for reconciled discharge medications provided to patient and family.      Discharge Statement:  I have spent a total time of 25 minutes in caring for this patient on the day of the visit/encounter.

## 2025-02-20 NOTE — PROGRESS NOTES
"Progress Note - Orthopedics   Name: Delmy Candelario Check 74 y.o. female I MRN: [id number]  Unit/Bed#: W -01 I Date of Admission: 2/15/2025   Date of Service: 2/20/2025 I Hospital Day: 4    Assessment & Plan  Nondisplaced spiral fracture of shaft of left tibia, initial encounter for closed fracture  S/p ORIF of left intra-articular distal tibia fracture with fixation of tibia only with Dr. Hinds 2/17/2025 (POD 3).   Non weight bearing to the left lower extremity in camboot.   Pain control per primary team.   PT/OT  Monitor for s/s ABLA, transfuse for hgb less than 7, per primary team.   Medical management per primary team.   DVT ppx: lovenox while admitted, recommend aspirin 81mg BID x 6 weeks on d/c.   Should follow up with Dr. Hinds upon discharge.     Ok for discharge from Orthopedics service perspective.  Orthopedics service will follow.  Please contact the SecureChat role for the Orthopedics service with any questions/concerns.    Subjective   74 y.o.female seen and examined at bedside. No significant complaints today. Pain controlled. Still with intermittent numbness/tingling in the left lower extremity. Eager for discharge when able.     Objective :  Temp:  [97.8 °F (36.6 °C)-98.1 °F (36.7 °C)] 97.8 °F (36.6 °C)  HR:  [71-81] 74  BP: (101-105)/(59-61) 102/59  Resp:  [18] 18  SpO2:  [96 %-99 %] 96 %    Physical Exam  Musculoskeletal: Left lower extremity  Surgical dressings c/d/I with camboot in place.   Proximal calf soft and compressible.   Digits warm and well perfused  +EHL/FHL  Sensation intact to saphenous, sural, tibial, superficial peroneal nerve, and deep peroneal      Lab Results: I have reviewed the following results:  Recent Labs     02/18/25  0531   WBC 10.80*   HGB 12.1   HCT 37.8      BUN 13   CREATININE 0.73     Blood Culture:  No results found for: \"BLOODCX\"  Wound Culture: No results found for: \"WOUNDCULT\"    Imaging Results Review: No pertinent imaging studies " reviewed.  Other Study Results Review: No additional pertinent studies reviewed.

## 2025-02-20 NOTE — PLAN OF CARE
Problem: PAIN - ADULT  Goal: Verbalizes/displays adequate comfort level or baseline comfort level  Description: Interventions:  - Encourage patient to monitor pain and request assistance  - Assess pain using appropriate pain scale  - Administer analgesics based on type and severity of pain and evaluate response  - Implement non-pharmacological measures as appropriate and evaluate response  - Consider cultural and social influences on pain and pain management  - Notify physician/advanced practitioner if interventions unsuccessful or patient reports new pain  Outcome: Progressing     Problem: INFECTION - ADULT  Goal: Absence or prevention of progression during hospitalization  Description: INTERVENTIONS:  - Assess and monitor for signs and symptoms of infection  - Monitor lab/diagnostic results  - Monitor all insertion sites, i.e. indwelling lines, tubes, and drains  - Monitor endotracheal if appropriate and nasal secretions for changes in amount and color  - [place] appropriate cooling/warming therapies per order  - Administer medications as ordered  - Instruct and encourage patient and family to use good hand hygiene technique  - Identify and instruct in appropriate isolation precautions for identified infection/condition  Outcome: Progressing  Goal: Absence of fever/infection during neutropenic period  Description: INTERVENTIONS:  - Monitor WBC    Outcome: Progressing     Problem: SAFETY ADULT  Goal: Patient will remain free of falls  Description: INTERVENTIONS:  - Educate patient/family on patient safety including physical limitations  - Instruct patient to call for assistance with activity   - Consult OT/PT to assist with strengthening/mobility   - Keep Call bell within reach  - Keep bed low and locked with side rails adjusted as appropriate  - Keep care items and personal belongings within reach  - Initiate and maintain comfort rounds  - Make Fall Risk Sign visible to staff  - Offer Toileting every  Hours,  in advance of need  - Initiate/Maintain alarm  - Obtain necessary fall risk management equipme  - Apply yellow socks and bracelet for high fall risk patients  - Consider moving patient to room near nurses station  Outcome: Progressing  Goal: Maintain or return to baseline ADL function  Description: INTERVENTIONS:  -  Assess patient's ability to carry out ADLs; assess patient's baseline for ADL function and identify physical deficits which impact ability to perform ADLs (bathing, care of mouth/teeth, toileting, grooming, dressing, etc.)  - Assess/evaluate cause of self-care deficits   - Assess range of motion  - Assess patient's mobility; develop plan if impaired  - Assess patient's need for assistive devices and provide as appropriate  - Encourage maximum independence but intervene and supervise when necessary  - Involve family in performance of ADLs  - Assess for home care needs following discharge   - Consider OT consult to assist with ADL evaluation and planning for discharge  - Provide patient education as appropriate  Outcome: Progressing  Goal: Maintains/Returns to pre admission functional level  Description: INTERVENTIONS:  - Perform AM-PAC 6 Click Basic Mobility/ Daily Activity assessment daily.  - Set and communicate daily mobility goal to care team and patient/family/caregiver.   - Collaborate with rehabilitation services on mobility goals if consulted  - Perform Range of Motion  times a day.  - Reposition patient every  hours.  - Dangle patient  times a day  - Stand patient  times a day  - Ambulate patient  times a day  - Out of bed to chair  times a day   - Out of bed for meal times a day  - Out of bed for toileting  - Record patient progress and toleration of activity level   Outcome: Progressing     Problem: DISCHARGE PLANNING  Goal: Discharge to home or other facility with appropriate resources  Description: INTERVENTIONS:  - Identify barriers to discharge w/patient and caregiver  - Arrange for needed  discharge resources and transportation as appropriate  - Identify discharge learning needs (meds, wound care, etc.)  - Arrange for interpretive services to assist at discharge as needed  - Refer to Case Management Department for coordinating discharge planning if the patient needs post-hospital services based on physician/advanced practitioner order or complex needs related to functional status, cognitive ability, or social support system  Outcome: Progressing     Problem: Knowledge Deficit  Goal: Patient/family/caregiver demonstrates understanding of disease process, treatment plan, medications, and discharge instructions  Description: Complete learning assessment and assess knowledge base.  Interventions:  - Provide teaching at level of understanding  - Provide teaching via preferred learning methods  Outcome: Progressing

## 2025-02-20 NOTE — PLAN OF CARE
Problem: PAIN - ADULT  Goal: Verbalizes/displays adequate comfort level or baseline comfort level  Description: Interventions:  - Encourage patient to monitor pain and request assistance  - Assess pain using appropriate pain scale  - Administer analgesics based on type and severity of pain and evaluate response  - Implement non-pharmacological measures as appropriate and evaluate response  - Consider cultural and social influences on pain and pain management  - Notify physician/advanced practitioner if interventions unsuccessful or patient reports new pain  Outcome: Progressing     Problem: INFECTION - ADULT  Goal: Absence or prevention of progression during hospitalization  Description: INTERVENTIONS:  - Assess and monitor for signs and symptoms of infection  - Monitor lab/diagnostic results  - Monitor all insertion sites, i.e. indwelling lines, tubes, and drains  - Monitor endotracheal if appropriate and nasal secretions for changes in amount and color  - [place] appropriate cooling/warming therapies per order  - Administer medications as ordered  - Instruct and encourage patient and family to use good hand hygiene technique  - Identify and instruct in appropriate isolation precautions for identified infection/condition  Outcome: Progressing  Goal: Absence of fever/infection during neutropenic period  Description: INTERVENTIONS:  - Monitor WBC    Outcome: Progressing     Problem: SAFETY ADULT  Goal: Patient will remain free of falls  Description: INTERVENTIONS:  - Educate patient/family on patient safety including physical limitations  - Instruct patient to call for assistance with activity   - Consult OT/PT to assist with strengthening/mobility   - Keep Call bell within reach  - Keep bed low and locked with side rails adjusted as appropriate  - Keep care items and personal belongings within reach  - Initiate and maintain comfort rounds  - Make Fall Risk Sign visible to staff  - Offer Toileting every  Hours,  in advance of need  - Initiate/Maintain alarm  - Obtain necessary fall risk management equipme  - Apply yellow socks and bracelet for high fall risk patients  - Consider moving patient to room near nurses station  Outcome: Progressing  Goal: Maintain or return to baseline ADL function  Description: INTERVENTIONS:  -  Assess patient's ability to carry out ADLs; assess patient's baseline for ADL function and identify physical deficits which impact ability to perform ADLs (bathing, care of mouth/teeth, toileting, grooming, dressing, etc.)  - Assess/evaluate cause of self-care deficits   - Assess range of motion  - Assess patient's mobility; develop plan if impaired  - Assess patient's need for assistive devices and provide as appropriate  - Encourage maximum independence but intervene and supervise when necessary  - Involve family in performance of ADLs  - Assess for home care needs following discharge   - Consider OT consult to assist with ADL evaluation and planning for discharge  - Provide patient education as appropriate  Outcome: Progressing  Goal: Maintains/Returns to pre admission functional level  Description: INTERVENTIONS:  - Perform AM-PAC 6 Click Basic Mobility/ Daily Activity assessment daily.  - Set and communicate daily mobility goal to care team and patient/family/caregiver.   - Collaborate with rehabilitation services on mobility goals if consulted  - Perform Range of Motion  times a day.  - Reposition patient every  hours.  - Dangle patient  times a day  - Stand patient  times a day  - Ambulate patient  times a day  - Out of bed to chair  times a day   - Out of bed for meal times a day  - Out of bed for toileting  - Record patient progress and toleration of activity level   Outcome: Progressing     Problem: DISCHARGE PLANNING  Goal: Discharge to home or other facility with appropriate resources  Description: INTERVENTIONS:  - Identify barriers to discharge w/patient and caregiver  - Arrange for needed  discharge resources and transportation as appropriate  - Identify discharge learning needs (meds, wound care, etc.)  - Arrange for interpretive services to assist at discharge as needed  - Refer to Case Management Department for coordinating discharge planning if the patient needs post-hospital services based on physician/advanced practitioner order or complex needs related to functional status, cognitive ability, or social support system  Outcome: Progressing     Problem: Knowledge Deficit  Goal: Patient/family/caregiver demonstrates understanding of disease process, treatment plan, medications, and discharge instructions  Description: Complete learning assessment and assess knowledge base.  Interventions:  - Provide teaching at level of understanding  - Provide teaching via preferred learning methods  Outcome: Progressing

## 2025-02-20 NOTE — CASE MANAGEMENT
Case Management Discharge Planning Note    Patient name Delmy Candelario Check  Location W /W -01 MRN [id number]  : 1950 Date 2025       Current Admission Date: 2/15/2025  Current Admission Diagnosis:Nondisplaced spiral fracture of shaft of left tibia, initial encounter for closed fracture   Patient Active Problem List    Diagnosis Date Noted Date Diagnosed    Fall 2025     Nondisplaced spiral fracture of shaft of left tibia, initial encounter for closed fracture 02/15/2025     Prediabetes 2024     Vitamin D deficiency 2024     MINI positive 2021     Dry nose 2021     Mixed rhinitis 01/15/2021     Dysphonia 01/15/2021     Pharyngoesophageal dysphagia 01/15/2021     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     Vocal fold atrophy 01/15/2021     Xerostomia 01/15/2021     Gluten intolerance 01/15/2021     Risk of exposure to Lyme disease 01/15/2021     Hyperlipidemia 10/16/2013       LOS (days): 4  Geometric Mean LOS (GMLOS) (days): 4.2  Days to GMLOS:-0.3     OBJECTIVE:  Risk of Unplanned Readmission Score: 8.24         Current admission status: Inpatient   Preferred Pharmacy:   [facility]/pharmacy #[id number] [address] [address]  Phone: [phone] Fax: [phone]    Primary Care Provider: Santiago Fisher MD    Primary Insurance: MEDICARE  Secondary Insurance: [facility] [employer] OPTIONS PROGRAM    DISCHARGE DETAILS:    Discharge planning discussed with:: pt  Freedom of Choice: Yes  Comments - Freedom of Choice: Pt is stable for DC and will be going to rehab.  [facility] does not have a bed today.  CM met with pt to review the other facilaities on her list.  Pt stated she would like to go to  for rehab.  They have a bed available today.  Pt will need transportation in order to DC.     Other Referral/Resources/Interventions Provided:  Interventions:  Transportation, Short Term Rehab  Referral Comments: Request has been made for 1530 for [facility] for pt to go to .  [facility] will tranport pt at 1730.  All parties involved have been notified of DC arrangements and planning.  No further CM interventions needed at this time.     Treatment Team Recommendation: Acute Rehab  Discharge Destination Plan:: Short Term Rehab  Transport at Discharge : Wheelchair van        Transported by (Company and Unit #): [facility]  ETA of Transport (Date): 02/20/25  ETA of Transport (Time): 1730      IMM Given (Date):: 02/20/25  IMM Given to:: Patient   IMM reviewed with patient, patient agrees with discharge determination.     Accepting Facility Name, City & State : [facility]  Receiving Facility/Agency Phone Number: [phone]  Facility/Agency Fax Number: [phone]

## 2025-03-03 ENCOUNTER — APPOINTMENT (OUTPATIENT)
Dept: RADIOLOGY | Facility: AMBULARY SURGERY CENTER | Age: 75
End: 2025-03-03
Attending: STUDENT IN AN ORGANIZED HEALTH CARE EDUCATION/TRAINING PROGRAM
Payer: MEDICARE

## 2025-03-03 ENCOUNTER — OFFICE VISIT (OUTPATIENT)
Dept: OBGYN CLINIC | Facility: CLINIC | Age: 75
End: 2025-03-03

## 2025-03-03 VITALS — HEIGHT: 66 IN | BODY MASS INDEX: 28.03 KG/M2 | WEIGHT: 174.38 LBS

## 2025-03-03 DIAGNOSIS — S82.245A NONDISPLACED SPIRAL FRACTURE OF SHAFT OF LEFT TIBIA, INITIAL ENCOUNTER FOR CLOSED FRACTURE: Primary | ICD-10-CM

## 2025-03-03 DIAGNOSIS — S82.245A NONDISPLACED SPIRAL FRACTURE OF SHAFT OF LEFT TIBIA, INITIAL ENCOUNTER FOR CLOSED FRACTURE: ICD-10-CM

## 2025-03-03 PROCEDURE — 73610 X-RAY EXAM OF ANKLE: CPT

## 2025-03-03 PROCEDURE — 99024 POSTOP FOLLOW-UP VISIT: CPT | Performed by: STUDENT IN AN ORGANIZED HEALTH CARE EDUCATION/TRAINING PROGRAM

## 2025-03-03 NOTE — PATIENT INSTRUCTIONS
Orthopaedic Surgery - Office Note  Delmy Curiel (74 y.o. female)   : 1950   MRN: [id number]  Encounter Date: 3/3/2025    Chief Complaint   Patient presents with    Left Ankle - Fracture, Post-op     Past Surgical History:   Procedure Laterality Date    ORIF TIBIA & FIBULA FRACTURES Left 2025    Procedure: OPEN REDUCTION W/ INTERNAL FIXATION (ORIF) ANKLE and all associated procedures;  Surgeon: Eloy Hinds DO;  [facility];  Service: Orthopedics     Assessment / Plan  Status post open reduction internal station of left distal intra-articular tibia fracture, date of surgery 2025        X-rays reviewed with the patient was demonstrated left open reduction internal fixation of the left distal intra-articular tibia fracture.  No signs of hardware loosening or failure.  No change in alignment of the fracture.  NWB LLE in CAM boot  Okay to begin showering and letting warm soapy water run down the length of the incision. Do not submerge in water.  Sutures removed.  Steri-Strips applied  Begin ROM exercises only with PT, remain NWB.  Okay to come out of the boot for range of motion exercises when stationary  Continue Vitamin D and Calcium supplements  Continue aspirin 81 mg twice daily for a total of 6 weeks from the date of surgery  Follow up in 4 weeks for repeat Xrays of left ankle and repeat evaluation    History of Present Illness  Delmy Curiel is a 74 y.o. female who presents 2 weeks s/p ORIF Left tibial plafond. She is doing well and has remained NWB in a CAM boot. Denies numbness and tingling. She is in minimal pain at this time.  Denies any new traumatic injuries.  Currently residing at [facility].    Review of Systems  Pertinent items are noted in HPI.  All other systems were reviewed and are negative.    Physical Exam  There were no vitals taken for this visit.  Cons: Appears well.  No apparent distress.  Psych: Alert. Oriented x3.  Mood and affect  normal.  Eyes: PERRLA, EOMI  Resp: Normal effort.  No audible wheezing or stridor.  CV: Palpable pulse.  No discernable arrhythmia.    Lymph:  No palpable cervical, axillary, or inguinal lymphadenopathy.  Skin: Warm.  No palpable masses.  No visible lesions.  Neuro: Normal muscle tone.  Normal and symmetric DTR's.     The left lower extremity was exposed and inspected. Visible skin intact without erythema, ecchymosis, effusion or obvious osseous deformity. TTP jagjit-incisionally. Pt able to range from 5 degrees dorsiflexion to 30 degrees of plantarflexion.  No pain with external rotation of the ankle.  Mild tenderness to palpation over the proximal fibula fracture.  Range of motion knee is from 0 to 120 degrees of flexion.  Sensation intact to superficial peroneal, deep peroneal, sural, saphenous, plantar nerve distributions. Motor intact to extensor hallux longus, tibialis anterior, gastrocnemius muscles, extensor mechanism intact. Limb is well perfused. Brisk capillary refill in all 5 digits. Compartments soft and compressible.      Studies Reviewed  X-rays of the left ankle demonstrate a distal third tibial shaft fracture with extension into the tibial plafond status post ORIF with no change in alignment or hardware loosening or failure.  Ankle mortise well-maintained.

## 2025-03-03 NOTE — PROGRESS NOTES
Orthopaedic Surgery - Office Note  Delmy Curiel (74 y.o. female)   : 1950   MRN: [id number]  Encounter Date: 3/3/2025    Chief Complaint   Patient presents with    Left Ankle - Fracture, Post-op     Past Surgical History:   Procedure Laterality Date    ORIF TIBIA & FIBULA FRACTURES Left 2025    Procedure: OPEN REDUCTION W/ INTERNAL FIXATION (ORIF) ANKLE and all associated procedures;  Surgeon: Eloy Hinds DO;  [facility];  Service: Orthopedics     Assessment / Plan  Status post open reduction internal station of left distal intra-articular tibia fracture, date of surgery 2025        X-rays reviewed with the patient was demonstrated left open reduction internal fixation of the left distal intra-articular tibia fracture.  No signs of hardware loosening or failure.  No change in alignment of the fracture.  NWB LLE in CAM boot  Okay to begin showering and letting warm soapy water run down the length of the incision. Do not submerge in water.  Sutures removed.  Steri-Strips applied  Begin ROM exercises only with PT, remain NWB.  Okay to come out of the boot for range of motion exercises when stationary  Continue Vitamin D and Calcium supplements  Continue aspirin 81 mg twice daily for a total of 6 weeks from the date of surgery  Follow up in 4 weeks for repeat Xrays of left ankle and repeat evaluation    History of Present Illness  Delmy Curiel is a 74 y.o. female who presents 2 weeks s/p ORIF Left tibial plafond. She is doing well and has remained NWB in a CAM boot. Denies numbness and tingling. She is in minimal pain at this time.  Denies any new traumatic injuries.  Currently residing at [facility].    Review of Systems  Pertinent items are noted in HPI.  All other systems were reviewed and are negative.    Physical Exam  There were no vitals taken for this visit.  Cons: Appears well.  No apparent distress.  Psych: Alert. Oriented x3.  Mood and affect  normal.  Eyes: PERRLA, EOMI  Resp: Normal effort.  No audible wheezing or stridor.  CV: Palpable pulse.  No discernable arrhythmia.    Lymph:  No palpable cervical, axillary, or inguinal lymphadenopathy.  Skin: Warm.  No palpable masses.  No visible lesions.  Neuro: Normal muscle tone.  Normal and symmetric DTR's.     The left lower extremity was exposed and inspected. Visible skin intact without erythema, ecchymosis, effusion or obvious osseous deformity. TTP jagjit-incisionally. Pt able to range from 5 degrees dorsiflexion to 30 degrees of plantarflexion.  No pain with external rotation of the ankle.  Mild tenderness to palpation over the proximal fibula fracture.  Range of motion knee is from 0 to 120 degrees of flexion.  Sensation intact to superficial peroneal, deep peroneal, sural, saphenous, plantar nerve distributions. Motor intact to extensor hallux longus, tibialis anterior, gastrocnemius muscles, extensor mechanism intact. Limb is well perfused. Brisk capillary refill in all 5 digits. Compartments soft and compressible.      Studies Reviewed  X-rays of the left ankle demonstrate a distal third tibial shaft fracture with extension into the tibial plafond status post ORIF with no change in alignment or hardware loosening or failure.  Ankle mortise well-maintained.      Procedures      Medical, Surgical, Family, and Social History  The patient's medical history, family history, and social history, were reviewed and updated as appropriate.    Past Medical History:   Diagnosis Date    Diverticulosis 2021    Long term use of drug     RESOLVED: [id number]           Family History   Problem Relation Age of Onset    Colon cancer Mother     Lung cancer Maternal Aunt     Brain cancer Maternal Uncle        Social History     Occupational History    Not on file   Tobacco Use    Smoking status: Never     Passive exposure: Current    Smokeless tobacco: Never   Vaping Use    Vaping status: Never Used   Substance and Sexual  Activity    Alcohol use: Yes     Comment: SOCIAL     Drug use: Not Currently     Comment: FORMER RECREATIONAL DRUG USER     Sexual activity: Not Currently       Allergies   Allergen Reactions    Cat Dander Other (See Comments)     Itchy eyes     Dust Mite Extract Other (See Comments)     Itchy eyes          Current Outpatient Medications:     aspirin (ECOTRIN LOW STRENGTH) 81 mg EC tablet, Take 1 tablet (81 mg total) by mouth 2 (two) times a day, Disp: 84 tablet, Rfl: 0    Cholecalciferol (Vitamin D) 50 MCG (2000 UT) tablet, Take 2,000 Units by mouth daily, Disp: , Rfl:     Cyanocobalamin (B-12 PO), Take by mouth, Disp: , Rfl:     Multiple Vitamin (multivitamin) capsule, Take 1 capsule by mouth daily, Disp: , Rfl:     senna-docusate sodium (SENOKOT S) 8.6-50 mg per tablet, Take 1 tablet by mouth daily at bedtime, Disp: , Rfl:     acetaminophen (TYLENOL) 325 mg tablet, Take 3 tablets (975 mg total) by mouth every 8 (eight) hours, Disp: , Rfl:     fluocinolone acetonide (DermOtic) 0.01 % otic oil, Administer 5 drops into both ears 2 (two) times a day, Disp: 20 mL, Rfl: 0    fluticasone (FLONASE) 50 mcg/act nasal spray, 1 spray into each nostril daily, Disp: 9.9 mL, Rfl: 2    polyethylene glycol (MIRALAX) 17 g packet, Take 17 g by mouth daily, Disp: , Rfl:       Nathanael Mejia MD    Scribe Attestation      I,:   am acting as a scribe while in the presence of the attending physician.:       I,:   personally performed the services described in this documentation    as scribed in my presence.:

## 2025-03-04 ENCOUNTER — TELEPHONE (OUTPATIENT)
Age: 75
End: 2025-03-04

## 2025-03-04 NOTE — TELEPHONE ENCOUNTER
Caller: Dionne RAMESH [facility]    Doctor: Dr. Hinds / Sanofrd    Reason for call: Dionne is calling to see if patient is able/allowed to use a knee scooter, she also states she would need this in writing; please advise/fax.     Call back#: [phone] (Dionne, direct)    Fax: [phone]

## 2025-03-11 ENCOUNTER — HOME HEALTH ADMISSION (OUTPATIENT)
Dept: HOME HEALTH SERVICES | Facility: HOME HEALTHCARE | Age: 75
End: 2025-03-11
Payer: MEDICARE

## 2025-03-12 ENCOUNTER — TELEPHONE (OUTPATIENT)
Age: 75
End: 2025-03-12

## 2025-03-14 ENCOUNTER — HOME CARE VISIT (OUTPATIENT)
Dept: HOME HEALTH SERVICES | Facility: HOME HEALTHCARE | Age: 75
End: 2025-03-14

## 2025-03-15 ENCOUNTER — HOME CARE VISIT (OUTPATIENT)
Dept: HOME HEALTH SERVICES | Facility: HOME HEALTHCARE | Age: 75
End: 2025-03-15
Payer: MEDICARE

## 2025-03-15 VITALS
HEART RATE: 80 BPM | DIASTOLIC BLOOD PRESSURE: 70 MMHG | SYSTOLIC BLOOD PRESSURE: 118 MMHG | OXYGEN SATURATION: 98 % | RESPIRATION RATE: 16 BRPM

## 2025-03-15 PROCEDURE — G0151 HHCP-SERV OF PT,EA 15 MIN: HCPCS

## 2025-03-15 PROCEDURE — 400013 VN SOC

## 2025-03-15 PROCEDURE — 10330081 VN NO-PAY CLAIM PROCEDURE

## 2025-03-17 ENCOUNTER — HOME CARE VISIT (OUTPATIENT)
Dept: HOME HEALTH SERVICES | Facility: HOME HEALTHCARE | Age: 75
End: 2025-03-17
Payer: MEDICARE

## 2025-03-17 PROCEDURE — G0321 AUDIO-ONLY HHS: HCPCS

## 2025-03-17 NOTE — CASE COMMUNICATION
Medication discrepancies or Major drug interactions none identified  patient reports not taking cyanocolabalamin  Abnormal clinical findings none  This report is informational only, no response is needed  [facility] has Admitted your patient to Home Health service with the following disciplines: PT and OT  Patient stated goals of care to be able to do the steps and take a shower  Potential barriers to goal achievement environmental,  NWB LLE  Primary focus of home health care:Musculoskeletal  Anticipated visit pattern and next visit date 1x1wk 2 x 3wk  Thank you for allowing us to participate in the care of your patient.      Deyanira Rivas PT

## 2025-03-18 ENCOUNTER — HOME CARE VISIT (OUTPATIENT)
Dept: HOME HEALTH SERVICES | Facility: HOME HEALTHCARE | Age: 75
End: 2025-03-18
Payer: MEDICARE

## 2025-03-18 VITALS
DIASTOLIC BLOOD PRESSURE: 72 MMHG | SYSTOLIC BLOOD PRESSURE: 114 MMHG | HEART RATE: 88 BPM | OXYGEN SATURATION: 98 % | RESPIRATION RATE: 20 BRPM

## 2025-03-18 PROCEDURE — G0151 HHCP-SERV OF PT,EA 15 MIN: HCPCS

## 2025-03-20 ENCOUNTER — HOME CARE VISIT (OUTPATIENT)
Dept: HOME HEALTH SERVICES | Facility: HOME HEALTHCARE | Age: 75
End: 2025-03-20
Payer: MEDICARE

## 2025-03-20 VITALS — OXYGEN SATURATION: 99 % | DIASTOLIC BLOOD PRESSURE: 72 MMHG | SYSTOLIC BLOOD PRESSURE: 116 MMHG | HEART RATE: 77 BPM

## 2025-03-20 VITALS
RESPIRATION RATE: 20 BRPM | SYSTOLIC BLOOD PRESSURE: 120 MMHG | DIASTOLIC BLOOD PRESSURE: 70 MMHG | OXYGEN SATURATION: 98 % | HEART RATE: 92 BPM

## 2025-03-20 PROCEDURE — G0152 HHCP-SERV OF OT,EA 15 MIN: HCPCS

## 2025-03-20 PROCEDURE — G0151 HHCP-SERV OF PT,EA 15 MIN: HCPCS

## 2025-03-25 ENCOUNTER — HOME CARE VISIT (OUTPATIENT)
Dept: HOME HEALTH SERVICES | Facility: HOME HEALTHCARE | Age: 75
End: 2025-03-25
Payer: MEDICARE

## 2025-03-25 VITALS
HEART RATE: 78 BPM | DIASTOLIC BLOOD PRESSURE: 76 MMHG | RESPIRATION RATE: 20 BRPM | SYSTOLIC BLOOD PRESSURE: 116 MMHG | OXYGEN SATURATION: 98 %

## 2025-03-25 PROCEDURE — G0151 HHCP-SERV OF PT,EA 15 MIN: HCPCS

## 2025-03-27 ENCOUNTER — HOME CARE VISIT (OUTPATIENT)
Dept: HOME HEALTH SERVICES | Facility: HOME HEALTHCARE | Age: 75
End: 2025-03-27
Payer: MEDICARE

## 2025-03-27 VITALS — OXYGEN SATURATION: 98 % | HEART RATE: 80 BPM | SYSTOLIC BLOOD PRESSURE: 120 MMHG | DIASTOLIC BLOOD PRESSURE: 70 MMHG

## 2025-03-27 PROCEDURE — G0151 HHCP-SERV OF PT,EA 15 MIN: HCPCS

## 2025-03-31 ENCOUNTER — OFFICE VISIT (OUTPATIENT)
Dept: OBGYN CLINIC | Facility: CLINIC | Age: 75
End: 2025-03-31

## 2025-03-31 ENCOUNTER — APPOINTMENT (OUTPATIENT)
Dept: RADIOLOGY | Facility: AMBULARY SURGERY CENTER | Age: 75
End: 2025-03-31
Attending: STUDENT IN AN ORGANIZED HEALTH CARE EDUCATION/TRAINING PROGRAM
Payer: MEDICARE

## 2025-03-31 VITALS — WEIGHT: 174.38 LBS | BODY MASS INDEX: 28.03 KG/M2 | HEIGHT: 66 IN

## 2025-03-31 DIAGNOSIS — S82.245A NONDISPLACED SPIRAL FRACTURE OF SHAFT OF LEFT TIBIA, INITIAL ENCOUNTER FOR CLOSED FRACTURE: ICD-10-CM

## 2025-03-31 PROCEDURE — 99024 POSTOP FOLLOW-UP VISIT: CPT | Performed by: STUDENT IN AN ORGANIZED HEALTH CARE EDUCATION/TRAINING PROGRAM

## 2025-03-31 PROCEDURE — 73610 X-RAY EXAM OF ANKLE: CPT

## 2025-03-31 NOTE — PROGRESS NOTES
Orthopaedic Surgery - Office Note  Delmy Curiel (74 y.o. female)   : 1950   MRN: [id number]  Encounter Date: 3/31/2025    Chief Complaint   Patient presents with    Left Ankle - Fracture, Post-op     Past Surgical History:   Procedure Laterality Date    ORIF TIBIA & FIBULA FRACTURES Left 2025    Procedure: OPEN REDUCTION W/ INTERNAL FIXATION (ORIF) ANKLE and all associated procedures;  Surgeon: Eloy Hinds DO;  [facility];  Service: Orthopedics     Assessment / Plan  Status post open reduction internal station of left distal intra-articular tibia fracture, date of surgery 2025        X-rays reviewed with the patient was demonstrated left open reduction internal fixation of the left distal intra-articular tibia fracture.  No signs of hardware loosening or failure.  No change in alignment of the fracture.  Pt to be WBAT to LLE in high tide cam boot for 2-3 weeks then may transition out of cam boot to a regular shoe   Begin ROM and strengthening  exercises with PT  Okay to come out of the boot for range of motion exercises when stationary  Continue Vitamin D and Calcium supplements  Completed aspirin 81 mg twice daily for a total of 6 weeks from the date of surgery  Follow up in 6 weeks for repeat Xrays of left ankle and repeat evaluation    History of Present Illness  Delmy Curiel is a 74 y.o. female who presents 2 weeks s/p ORIF Left tibial plafond. She is doing well and has remained NWB in a CAM boot. Denies numbness and tingling. She is in minimal pain at this time.  Denies any new traumatic injuries.  Currently residing at [facility].    Interval history 3/31/25  Pt presents 6 weeks s/p ORIF Left tibial plafond. She is doing well and has remained NWB in a CAM boot. She is doing well and denies any significant pain in the left lower extremity. She continues tylenol for pain control.  She has been doing home PT and has regained ROM.  She denies any new  numbness or paresthesias.     Review of Systems  Pertinent items are noted in HPI.  All other systems were reviewed and are negative.    Physical Exam  There were no vitals taken for this visit.  Cons: Appears well.  No apparent distress.  Psych: Alert. Oriented x3.  Mood and affect normal.  Eyes: PERRLA, EOMI  Resp: Normal effort.  No audible wheezing or stridor.  CV: Palpable pulse.  No discernable arrhythmia.    Lymph:  No palpable cervical, axillary, or inguinal lymphadenopathy.  Skin: Warm.  No palpable masses.  No visible lesions.  Neuro: Normal muscle tone.  Normal and symmetric DTR's.     The left lower extremity was exposed and inspected. Visible skin intact without erythema, ecchymosis, effusion or obvious osseous deformity. Mild TTP jagjit-incisionally. Pt able to range from 10 degrees dorsiflexion to 40 degrees of plantarflexion.  No pain with external rotation of the ankle.  Mild tenderness to palpation over the proximal fibula fracture.  Range of motion knee is from 0 to 120 degrees of flexion.  Sensation intact to superficial peroneal, deep peroneal, sural, saphenous, plantar nerve distributions. Motor intact to extensor hallux longus, tibialis anterior, gastrocnemius muscles, extensor mechanism intact. Limb is well perfused. Brisk capillary refill in all 5 digits. Compartments soft and compressible.      Studies Reviewed  X-rays of the left ankle demonstrate a distal third tibial shaft fracture with extension into the tibial plafond status post ORIF with no change in alignment or hardware loosening or failure.  Ankle mortise well-maintained.      Procedures      Medical, Surgical, Family, and Social History  The patient's medical history, family history, and social history, were reviewed and updated as appropriate.    Past Medical History:   Diagnosis Date    Diverticulosis 2021    Long term use of drug     RESOLVED: [id number]           Family History   Problem Relation Age of Onset    Colon cancer  Mother     Lung cancer Maternal Aunt     Brain cancer Maternal Uncle        Social History     Occupational History    Not on file   Tobacco Use    Smoking status: Never     Passive exposure: Current    Smokeless tobacco: Never   Vaping Use    Vaping status: Never Used   Substance and Sexual Activity    Alcohol use: Yes     Comment: SOCIAL     Drug use: Not Currently     Comment: FORMER RECREATIONAL DRUG USER     Sexual activity: Not Currently       Allergies   Allergen Reactions    Cat Dander Other (See Comments)     Itchy eyes     Dust Mite Extract Other (See Comments)     Itchy eyes          Current Outpatient Medications:     aspirin (ECOTRIN LOW STRENGTH) 81 mg EC tablet, Take 1 tablet (81 mg total) by mouth 2 (two) times a day, Disp: 84 tablet, Rfl: 0    Cholecalciferol (Vitamin D) 50 MCG (2000 UT) tablet, Take 2,000 Units by mouth daily, Disp: , Rfl:     Multiple Vitamin (multivitamin) capsule, Take 1 capsule by mouth daily, Disp: , Rfl:     acetaminophen (TYLENOL) 325 mg tablet, Take 3 tablets (975 mg total) by mouth every 8 (eight) hours (Patient taking differently: Take 2 tablets by mouth every 4 (four) hours as needed for mild pain. for mild pain or fever per latest dci KV do not exceed 3000mg per 24 hours  Indications: Around the clock pain.), Disp: , Rfl:     ascorbic acid (VITAMIN C) 500 mg tablet, Take 500 mg by mouth 2 (two) times a day., Disp: , Rfl:     bisacodyl (Dulcolax) 10 mg suppository, Insert 10 mg into the rectum daily as needed for constipation. give on day 5 of no BM if MOM not effecttive per latest dci KV, Disp: , Rfl:     fluocinolone acetonide (DermOtic) 0.01 % otic oil, Administer 5 drops into both ears 2 (two) times a day (Patient taking differently: Administer 5 drops into both ears 2 (two) times a day as needed (itching). per latest dci KV), Disp: 20 mL, Rfl: 0    fluticasone (FLONASE) 50 mcg/act nasal spray, 1 spray into each nostril daily (Patient taking differently: 1 spray into  each nostril daily as needed for rhinitis. per latest dci KV), Disp: 9.9 mL, Rfl: 2    magnesium hydroxide (Milk of Magnesia) 400 mg/5 mL oral suspension, Take 30 mL by mouth daily as needed for constipation. on day four of no bm per latest dci KV, Disp: , Rfl:     oxyCODONE (ROXICODONE) 5 immediate release tablet, Take 5 mg by mouth every 4 (four) hours as needed for severe pain. per latest dci KV, Disp: , Rfl:     polyethylene glycol (MIRALAX) 17 g packet, Take 17 g by mouth daily (Patient taking differently: Take 17 g by mouth daily. Dissolve in 8 oz of water  Indications: Constipation), Disp: , Rfl:     senna-docusate sodium (SENOKOT S) 8.6-50 mg per tablet, Take 1 tablet by mouth daily at bedtime, Disp: , Rfl:     sodium phosphate-biphosphate 7-19 g 118 mL enema, Insert 1 enema into the rectum daily as needed (constipation). give on day 6 of no bm if duklcoilax not effective per latest dci KV, Disp: , Rfl:     ZINC SULFATE PO, Take 50 mg by mouth daily. per latest dci 1 tab daily per [facility], Disp: , Rfl:       Oli Brumfield PA-C    Scribe Attestation      I,:   am acting as a scribe while in the presence of the attending physician.:       I,:   personally performed the services described in this documentation    as scribed in my presence.:

## 2025-04-01 ENCOUNTER — HOME CARE VISIT (OUTPATIENT)
Dept: HOME HEALTH SERVICES | Facility: HOME HEALTHCARE | Age: 75
End: 2025-04-01
Payer: MEDICARE

## 2025-04-01 VITALS
HEART RATE: 92 BPM | RESPIRATION RATE: 20 BRPM | OXYGEN SATURATION: 98 % | SYSTOLIC BLOOD PRESSURE: 110 MMHG | DIASTOLIC BLOOD PRESSURE: 80 MMHG

## 2025-04-01 PROCEDURE — G0151 HHCP-SERV OF PT,EA 15 MIN: HCPCS

## 2025-04-03 ENCOUNTER — HOME CARE VISIT (OUTPATIENT)
Dept: HOME HEALTH SERVICES | Facility: HOME HEALTHCARE | Age: 75
End: 2025-04-03
Payer: MEDICARE

## 2025-04-03 VITALS
SYSTOLIC BLOOD PRESSURE: 126 MMHG | HEART RATE: 76 BPM | RESPIRATION RATE: 20 BRPM | OXYGEN SATURATION: 98 % | DIASTOLIC BLOOD PRESSURE: 70 MMHG

## 2025-04-03 PROCEDURE — G0151 HHCP-SERV OF PT,EA 15 MIN: HCPCS

## 2025-04-08 ENCOUNTER — HOME CARE VISIT (OUTPATIENT)
Dept: HOME HEALTH SERVICES | Facility: HOME HEALTHCARE | Age: 75
End: 2025-04-08
Payer: MEDICARE

## 2025-04-08 VITALS
HEART RATE: 88 BPM | SYSTOLIC BLOOD PRESSURE: 106 MMHG | RESPIRATION RATE: 20 BRPM | OXYGEN SATURATION: 97 % | DIASTOLIC BLOOD PRESSURE: 70 MMHG

## 2025-04-08 PROCEDURE — G0151 HHCP-SERV OF PT,EA 15 MIN: HCPCS

## 2025-04-10 ENCOUNTER — HOME CARE VISIT (OUTPATIENT)
Dept: HOME HEALTH SERVICES | Facility: HOME HEALTHCARE | Age: 75
End: 2025-04-10
Payer: MEDICARE

## 2025-04-10 VITALS
RESPIRATION RATE: 20 BRPM | SYSTOLIC BLOOD PRESSURE: 112 MMHG | OXYGEN SATURATION: 98 % | DIASTOLIC BLOOD PRESSURE: 66 MMHG | HEART RATE: 90 BPM

## 2025-04-10 PROCEDURE — G0151 HHCP-SERV OF PT,EA 15 MIN: HCPCS

## 2025-04-15 ENCOUNTER — HOME CARE VISIT (OUTPATIENT)
Dept: HOME HEALTH SERVICES | Facility: HOME HEALTHCARE | Age: 75
End: 2025-04-15
Payer: MEDICARE

## 2025-04-15 VITALS
OXYGEN SATURATION: 96 % | SYSTOLIC BLOOD PRESSURE: 110 MMHG | HEART RATE: 102 BPM | DIASTOLIC BLOOD PRESSURE: 64 MMHG | RESPIRATION RATE: 20 BRPM

## 2025-04-15 PROCEDURE — G0151 HHCP-SERV OF PT,EA 15 MIN: HCPCS

## 2025-04-17 ENCOUNTER — HOME CARE VISIT (OUTPATIENT)
Dept: HOME HEALTH SERVICES | Facility: HOME HEALTHCARE | Age: 75
End: 2025-04-17
Payer: MEDICARE

## 2025-04-17 VITALS
SYSTOLIC BLOOD PRESSURE: 130 MMHG | RESPIRATION RATE: 20 BRPM | DIASTOLIC BLOOD PRESSURE: 80 MMHG | HEART RATE: 78 BPM | OXYGEN SATURATION: 99 %

## 2025-04-17 PROCEDURE — G0151 HHCP-SERV OF PT,EA 15 MIN: HCPCS

## 2025-04-23 ENCOUNTER — EVALUATION (OUTPATIENT)
Dept: PHYSICAL THERAPY | Facility: REHABILITATION | Age: 75
End: 2025-04-23
Payer: MEDICARE

## 2025-04-23 DIAGNOSIS — S82.245A NONDISPLACED SPIRAL FRACTURE OF SHAFT OF LEFT TIBIA, INITIAL ENCOUNTER FOR CLOSED FRACTURE: ICD-10-CM

## 2025-04-23 PROCEDURE — 97162 PT EVAL MOD COMPLEX 30 MIN: CPT | Performed by: PHYSICAL THERAPIST

## 2025-04-23 PROCEDURE — 97110 THERAPEUTIC EXERCISES: CPT | Performed by: PHYSICAL THERAPIST

## 2025-04-23 NOTE — PROGRESS NOTES
PT Evaluation     Today's date: 2025  Patient name: Delmy Curiel  : 1950  MRN: [id number]  Referring provider: Eloy Hinds DO  Dx:   Encounter Diagnosis     ICD-10-CM    1. Nondisplaced spiral fracture of shaft of left tibia, initial encounter for closed fracture  S82.245A Ambulatory Referral to Physical Therapy          Start Time: 1245  Stop Time: 1330  Total time in clinic (min): 45 minutes    Assessment  Impairments: abnormal gait, abnormal muscle firing, abnormal muscle tone, abnormal or restricted ROM, abnormal movement, activity intolerance, impaired physical strength and pain with function    Assessment details: Delmy Curiel is a 74 y.o. female presenting to outpatient physical therapy on 25 with referral from MD after L tibial fracture in 2025. Patient presents FWB SPC. Upon evaluation, Delmy demonstrates impaired ankle ROM, strength and pain with resulting functional deficits. The listed impairments and functional limitation are effecting Delmy ability to function at prior level. They can continue to benefit from physical therapy services at this times in order to address the above discussed impairments and functional limitation in order to allow for a return to premorbid status     HEP: HR, gastroc stretch, DF mob, SLS    Understanding of Dx/Px/POC: good     Prognosis: good    Plan  Patient would benefit from: skilled PT  Planned modality interventions: thermotherapy: hydrocollator packs    Planned therapy interventions: joint mobilization, manual therapy, ADL training, balance, balance/weight bearing training, neuromuscular re-education, home exercise program, therapeutic exercise, therapeutic activities, strengthening, patient education, functional ROM exercises and gait training    Frequency: 2x week  Duration in weeks: 8  Plan of Care beginning date: 2025  Plan of Care expiration date: 2025  Treatment plan discussed with:  "patient    Subjective Evaluation    History of Present Illness  Mechanism of injury: Delmy is a 74 y.o. female presenting to physical therapy on 25 with referral from MD for right tibial fracture in 2025. She reports that on 25 she had fallen when shoveling. Xray revealed tibial fracture.           Patient Goals  Patient goals for therapy: decreased pain, increased strength, increased motion and return to sport/leisure activities    Pain  Current pain ratin  At worst pain ratin      Diagnostic Tests  X-ray: abnormal  Short Term Goals:   1. Patient will be Independent with hep  2. Patient will improve pain with activity by 50%  3. Patient will report GROC 50% or greater      Long Term Goals:   1. Patient will improve FOTO to greater then goal  2. Patient will improve pain with activity to 2/10 or less  3. Patient will continue with HEP independence to allow for decreased future reoccurrence of pain and loss in function  4. Patient will report GROC 75% or greater  5. Patient will amb without AD, normalized gait pattern  6. Patient will have normal ankle ROM  7. Patient will have 4/5 gastroc strength       Objective      GAIT: SPC, decreased L stance time            MMT         AROM          PROM    Hip       L       R        L           R      L     R   ER.         G. Max         G. Med         Iliop.         .         Knee         Extension         Flexion                  Ankle         Dorsi Flexion     10 15   Plantar Flexion     40 50   Inversion     20 30   Eversion     15 20                   Segmental mobility:   TCJ:   hypomobile   STJ:  normal                          Precautions: standard       Manuals             Ankle PF stretch AB            Ankle inversion stretch AB                                      Neuro Re-Ed             SLS 10\"x5            Standing balance board AP/ML                                                                             Ther Ex      " "       bike             Knee ext mach             HR DL 10x            Gastroc stretch 10\"x3            Ankle DF  mob 10x                                                   Ther Activity                                       Gait Training                                       Modalities                                            "

## 2025-04-30 ENCOUNTER — OFFICE VISIT (OUTPATIENT)
Dept: PHYSICAL THERAPY | Facility: REHABILITATION | Age: 75
End: 2025-04-30
Attending: STUDENT IN AN ORGANIZED HEALTH CARE EDUCATION/TRAINING PROGRAM
Payer: MEDICARE

## 2025-04-30 DIAGNOSIS — S82.245A NONDISPLACED SPIRAL FRACTURE OF SHAFT OF LEFT TIBIA, INITIAL ENCOUNTER FOR CLOSED FRACTURE: Primary | ICD-10-CM

## 2025-04-30 PROCEDURE — 97112 NEUROMUSCULAR REEDUCATION: CPT | Performed by: PHYSICAL THERAPIST

## 2025-04-30 PROCEDURE — 97140 MANUAL THERAPY 1/> REGIONS: CPT | Performed by: PHYSICAL THERAPIST

## 2025-04-30 NOTE — PROGRESS NOTES
"Daily Note     Today's date: 2025  Patient name: Delmy Candelario Check  : 1950  MRN: [id number]  Referring provider: Eloy Hinds DO  Dx:   Encounter Diagnosis     ICD-10-CM    1. Nondisplaced spiral fracture of shaft of left tibia, initial encounter for closed fracture  S82.245A           Start Time: 1300  Stop Time: 1335  Total time in clinic (min): 35 minutes    Subjective: Patient reports soreness in both her knee and ankle. Did wear the boot some.      Objective: See treatment diary below      Assessment: Tolerated treatment well. Patient exhibited good technique with therapeutic exercises and is improving with ROM. Amb with SPC, not boot today.       Plan: Continue per plan of care.      Precautions: standard       Manuals            Ankle PF stretch AB            Ankle inversion stretch AB AB           STM lateral ankle  AB                        Neuro Re-Ed             SLS 10\"x5 10\"x5           Standing balance board AP/ML AP/ML  2x10 each           Standing wobble board  ML  2x10                                                               Ther Ex             bike             Knee ext mach             HR DL 10x 3x10DL             Gastroc stretch 10\"x3            Ankle DF  mob 10x                                                   Ther Activity                                       Gait Training                                       Modalities                                            "

## 2025-05-01 ENCOUNTER — DOCUMENTATION (OUTPATIENT)
Dept: ADMINISTRATIVE | Facility: OTHER | Age: 75
End: 2025-05-01

## 2025-05-01 NOTE — PROGRESS NOTES
05/01/25 8:01 AM    Osteoporosis Management Post Fracture outreach is not required; there is an active DEXA screening order on file.    Thank you.  NIKOLAS QUEEN MA  [facility]

## 2025-05-07 ENCOUNTER — OFFICE VISIT (OUTPATIENT)
Dept: PHYSICAL THERAPY | Facility: REHABILITATION | Age: 75
End: 2025-05-07
Attending: STUDENT IN AN ORGANIZED HEALTH CARE EDUCATION/TRAINING PROGRAM
Payer: MEDICARE

## 2025-05-07 DIAGNOSIS — S82.245A NONDISPLACED SPIRAL FRACTURE OF SHAFT OF LEFT TIBIA, INITIAL ENCOUNTER FOR CLOSED FRACTURE: Primary | ICD-10-CM

## 2025-05-07 PROCEDURE — 97112 NEUROMUSCULAR REEDUCATION: CPT | Performed by: PHYSICAL THERAPIST

## 2025-05-07 PROCEDURE — 97110 THERAPEUTIC EXERCISES: CPT | Performed by: PHYSICAL THERAPIST

## 2025-05-07 PROCEDURE — 97140 MANUAL THERAPY 1/> REGIONS: CPT | Performed by: PHYSICAL THERAPIST

## 2025-05-07 NOTE — PROGRESS NOTES
"Daily Note     Today's date: 2025  Patient name: Delmy Candelario Check  : 1950  MRN: [id number]  Referring provider: Eloy Hinds DO  Dx:   Encounter Diagnosis     ICD-10-CM    1. Nondisplaced spiral fracture of shaft of left tibia, initial encounter for closed fracture  S82.245A           Start Time: 1415  Stop Time: 1500  Total time in clinic (min): 45 minutes    Subjective: Patient reports that her knee has been more bothersome then anything. States that after PT her knee was more bothersome.       Objective: See treatment diary below    Ankle ROM:  DF = 15 deg  PF = 35 deg rearfoot, 50 deg forefoot    Assessment: Tolerated treatment well. Patient reported less ankle stiffness post MT. She is challenged when walking due to combine ankle stiffness and knee pain. She does lack knee ext as I feel this is what is contributing to her knee pain. Patient to continue with ankle exercises and work in LAQ into TKE.       Plan: Continue per plan of care.      Precautions: standard       Manuals  5          Ankle PF stretch AB            Ankle inversion stretch AB AB AB          STM lateral ankle  AB           Knee ext mob   AB          Cuboid mob-plantar   AB          Neuro Re-Ed             SLS 10\"x5 10\"x5           Standing balance board AP/ML AP/ML  2x10 each           Standing wobble board  ML  2x10 Seated  Cir - CW and CCW  20x ea          SLS   Foam - 10\"x5 HRx1          Gait drills   Stance phase  2'                                    Ther Ex             bike             Knee ext mach             HR DL 10x 3x10DL 3x10  DL            Gastroc stretch 10\"x3            Ankle DF  mob 10x            VG DF and PF   L5  2x10 ea                                    Ther Activity                                       Gait Training                                       Modalities                                              "

## 2025-05-12 ENCOUNTER — APPOINTMENT (OUTPATIENT)
Dept: RADIOLOGY | Facility: AMBULARY SURGERY CENTER | Age: 75
End: 2025-05-12
Attending: STUDENT IN AN ORGANIZED HEALTH CARE EDUCATION/TRAINING PROGRAM
Payer: MEDICARE

## 2025-05-12 ENCOUNTER — OFFICE VISIT (OUTPATIENT)
Dept: OBGYN CLINIC | Facility: CLINIC | Age: 75
End: 2025-05-12

## 2025-05-12 VITALS — BODY MASS INDEX: 28.03 KG/M2 | HEIGHT: 66 IN | WEIGHT: 174.38 LBS

## 2025-05-12 DIAGNOSIS — S82.245A NONDISPLACED SPIRAL FRACTURE OF SHAFT OF LEFT TIBIA, INITIAL ENCOUNTER FOR CLOSED FRACTURE: ICD-10-CM

## 2025-05-12 PROCEDURE — 73610 X-RAY EXAM OF ANKLE: CPT

## 2025-05-12 PROCEDURE — 99024 POSTOP FOLLOW-UP VISIT: CPT | Performed by: STUDENT IN AN ORGANIZED HEALTH CARE EDUCATION/TRAINING PROGRAM

## 2025-05-12 NOTE — PROGRESS NOTES
Orthopaedic Surgery - Office Note  Delmy Curiel (75 y.o. female)   : 1950   MRN: [id number]  Encounter Date: 2025    No chief complaint on file.    Past Surgical History:   Procedure Laterality Date    ORIF TIBIA & FIBULA FRACTURES Left 2025    Procedure: OPEN REDUCTION W/ INTERNAL FIXATION (ORIF) ANKLE and all associated procedures;  Surgeon: Eloy Hinds DO;  Location: [facility];  Service: Orthopedics     Assessment / Plan  Status post open reduction internal station of left distal intra-articular tibia fracture, date of surgery 2025        X-rays reviewed with the patient was demonstrated left open reduction internal fixation of the left distal intra-articular tibia fracture.  No signs of hardware loosening or failure.  Interval callus formation noted.  No change in alignment of the fracture.  Pt to be WBAT to LLE in a regular shoe  Continue ROM and strengthening  exercises with PT  Okay to come out of the boot for range of motion exercises when stationary  Continue Vitamin D and Calcium supplements  Completed aspirin 81 mg twice daily for a total of 6 weeks from the date of surgery  Follow up in 8 weeks for repeat Xrays of left ankle and repeat evaluation    History of Present Illness  Delmy Curiel is a 75 y.o. female who presents 2 weeks s/p ORIF Left tibial plafond. She is doing well and has remained NWB in a CAM boot. Denies numbness and tingling. She is in minimal pain at this time.  Denies any new traumatic injuries.  Currently residing at [facility].    Interval history 3/31/25  Pt presents 6 weeks s/p ORIF Left tibial plafond. She is doing well and has remained NWB in a CAM boot. She is doing well and denies any significant pain in the left lower extremity. She continues tylenol for pain control.  She has been doing home PT and has regained ROM.  She denies any new numbness or paresthesias.     Interval history 2025  Patient presents  proximately 3 months status post left tibial plafond ORIF.  Overall she is doing well and has been weightbearing as tolerated in regular shoe at this time.  She denies any significant pain in the extremity continues to take Tylenol mildly for pain control.  Continuing PT as well as well as doing her home exercise regimen.  Has regained most of her range of motion.  Denies any obvious traumas.  Denies any acute numbness paresthesias.  Review of Systems  Pertinent items are noted in HPI.  All other systems were reviewed and are negative.    Physical Exam  There were no vitals taken for this visit.  Cons: Appears well.  No apparent distress.  Psych: Alert. Oriented x3.  Mood and affect normal.  Eyes: PERRLA, EOMI  Resp: Normal effort.  No audible wheezing or stridor.  CV: Palpable pulse.  No discernable arrhythmia.    Lymph:  No palpable cervical, axillary, or inguinal lymphadenopathy.  Skin: Warm.  No palpable masses.  No visible lesions.  Neuro: Normal muscle tone.  Normal and symmetric DTR's.     The left lower extremity was exposed and inspected. Visible skin intact without erythema, ecchymosis, effusion or obvious osseous deformity. Mild TTP jagjit-incisionally. Pt able to range from 10 degrees dorsiflexion to 40 degrees of plantarflexion.  No pain with external rotation of the ankle.  Mild tenderness to palpation over the proximal fibula fracture.  Range of motion knee is from 0 to 120 degrees of flexion.  Sensation intact to superficial peroneal, deep peroneal, sural, saphenous, plantar nerve distributions. Motor intact to extensor hallux longus, tibialis anterior, gastrocnemius muscles, extensor mechanism intact. Limb is well perfused. Brisk capillary refill in all 5 digits. Compartments soft and compressible.      Studies Reviewed  X-rays of the left ankle demonstrate a distal third tibial shaft fracture with extension into the tibial plafond status post ORIF with no change in alignment or hardware loosening or  failure.  Ankle mortise well-maintained.      Procedures      Medical, Surgical, Family, and Social History  The patient's medical history, family history, and social history, were reviewed and updated as appropriate.    Past Medical History:   Diagnosis Date    Diverticulosis 2021    Long term use of drug     RESOLVED: 17OCT2014           Family History   Problem Relation Age of Onset    Colon cancer Mother     Lung cancer Maternal Aunt     Brain cancer Maternal Uncle        Social History     Occupational History    Not on file   Tobacco Use    Smoking status: Never     Passive exposure: Current    Smokeless tobacco: Never   Vaping Use    Vaping status: Never Used   Substance and Sexual Activity    Alcohol use: Yes     Comment: SOCIAL     Drug use: Not Currently     Comment: FORMER RECREATIONAL DRUG USER     Sexual activity: Not Currently       Allergies   Allergen Reactions    Cat Dander Other (See Comments)     Itchy eyes     Dust Mite Extract Other (See Comments)     Itchy eyes          Current Outpatient Medications:     acetaminophen (TYLENOL) 325 mg tablet, Take 3 tablets (975 mg total) by mouth every 8 (eight) hours (Patient taking differently: Take 2 tablets by mouth every 4 (four) hours as needed for mild pain. for mild pain or fever per latest dci KV do not exceed 3000mg per 24 hours  Indications: Around the clock pain.), Disp: , Rfl:     ascorbic acid (VITAMIN C) 500 mg tablet, Take 500 mg by mouth 2 (two) times a day., Disp: , Rfl:     aspirin (ECOTRIN LOW STRENGTH) 81 mg EC tablet, Take 1 tablet (81 mg total) by mouth 2 (two) times a day, Disp: 84 tablet, Rfl: 0    bisacodyl (Dulcolax) 10 mg suppository, Insert 10 mg into the rectum daily as needed for constipation. give on day 5 of no BM if MOM not effecttive per latest dci KV, Disp: , Rfl:     Cholecalciferol (Vitamin D) 50 MCG (2000 UT) tablet, Take 2,000 Units by mouth daily, Disp: , Rfl:     fluocinolone acetonide (DermOtic) 0.01 % otic oil,  Administer 5 drops into both ears 2 (two) times a day (Patient taking differently: Administer 5 drops into both ears 2 (two) times a day as needed (itching). per latest dci KV), Disp: 20 mL, Rfl: 0    fluticasone (FLONASE) 50 mcg/act nasal spray, 1 spray into each nostril daily (Patient taking differently: 1 spray into each nostril daily as needed for rhinitis. per latest dci KV), Disp: 9.9 mL, Rfl: 2    magnesium hydroxide (Milk of Magnesia) 400 mg/5 mL oral suspension, Take 30 mL by mouth daily as needed for constipation. on day four of no bm per latest dci KV, Disp: , Rfl:     Multiple Vitamin (multivitamin) capsule, Take 1 capsule by mouth daily, Disp: , Rfl:     oxyCODONE (ROXICODONE) 5 immediate release tablet, Take 5 mg by mouth every 4 (four) hours as needed for severe pain. per latest dci KV, Disp: , Rfl:     polyethylene glycol (MIRALAX) 17 g packet, Take 17 g by mouth daily (Patient taking differently: Take 17 g by mouth daily. Dissolve in 8 oz of water  Indications: Constipation), Disp: , Rfl:     senna-docusate sodium (SENOKOT S) 8.6-50 mg per tablet, Take 1 tablet by mouth daily at bedtime, Disp: , Rfl:     sodium phosphate-biphosphate 7-19 g 118 mL enema, Insert 1 enema into the rectum daily as needed (constipation). give on day 6 of no bm if duklcoilax not effective per latest dci KV, Disp: , Rfl:     ZINC SULFATE PO, Take 50 mg by mouth daily. per latest dci 1 tab daily per [facility], Disp: , Rfl:       Oli Brumfield PA-C    Scribe Attestation      I,:   am acting as a scribe while in the presence of the attending physician.:       I,:   personally performed the services described in this documentation    as scribed in my presence.:

## 2025-05-14 ENCOUNTER — OFFICE VISIT (OUTPATIENT)
Dept: PHYSICAL THERAPY | Facility: REHABILITATION | Age: 75
End: 2025-05-14
Attending: STUDENT IN AN ORGANIZED HEALTH CARE EDUCATION/TRAINING PROGRAM
Payer: MEDICARE

## 2025-05-14 DIAGNOSIS — S82.245A NONDISPLACED SPIRAL FRACTURE OF SHAFT OF LEFT TIBIA, INITIAL ENCOUNTER FOR CLOSED FRACTURE: Primary | ICD-10-CM

## 2025-05-14 PROCEDURE — 97140 MANUAL THERAPY 1/> REGIONS: CPT | Performed by: PHYSICAL THERAPIST

## 2025-05-14 PROCEDURE — 97112 NEUROMUSCULAR REEDUCATION: CPT | Performed by: PHYSICAL THERAPIST

## 2025-05-14 NOTE — PROGRESS NOTES
"Daily Note     Today's date: 2025  Patient name: Delmy Candelario Check  : 1950  MRN: [id number]  Referring provider: Eloy Hinds DO  Dx:   Encounter Diagnosis     ICD-10-CM    1. Nondisplaced spiral fracture of shaft of left tibia, initial encounter for closed fracture  S82.245A           Start Time: 1422  Stop Time: 1500  Total time in clinic (min): 38 minutes    Subjective: Patient reports having knee and ankle soreness today. She reports that she felt well after last PT session, walking better/normal.      Objective: See treatment diary below    Ankle PF = 50 deg, inversion = 20 deg    Assessment: Tolerated treatment well. Patient improving with ankle PF and inversion as they are now WNL which was her biggest limitations prior. Will have her continue with ankle exercises at home, work in SAQ and TKE at home for improving knee ext.      Plan: Continue per plan of care.      Precautions: standard       Manuals          Ankle PF stretch AB            Ankle inversion stretch AB AB AB AB         STM lateral ankle  AB           Knee ext mob   AB AB         Cuboid mob-plantar   AB          Neuro Re-Ed             SLS 10\"x5 10\"x5           TKE    BJB  2x10         SAQ    5#  2x10         Standing balance board AP/ML AP/ML  2x10 each           Standing wobble board  ML  2x10 Seated  Cir - CW and CCW  20x ea AP  DL  20x  2 sets         SLS   Foam - 10\"x5 HRx1          Gait drills   Stance phase  2'                                    Ther Ex             bike             Knee ext mach             HR DL 10x 3x10DL 3x10  DL   3x10 DL         Gastroc stretch 10\"x3            Ankle DF  mob 10x            VG DF and PF   L5  2x10 ea                                    Ther Activity                                       Gait Training                                       Modalities                                                "

## 2025-05-21 ENCOUNTER — OFFICE VISIT (OUTPATIENT)
Dept: PHYSICAL THERAPY | Facility: REHABILITATION | Age: 75
End: 2025-05-21
Attending: STUDENT IN AN ORGANIZED HEALTH CARE EDUCATION/TRAINING PROGRAM
Payer: MEDICARE

## 2025-05-21 DIAGNOSIS — S82.245A NONDISPLACED SPIRAL FRACTURE OF SHAFT OF LEFT TIBIA, INITIAL ENCOUNTER FOR CLOSED FRACTURE: Primary | ICD-10-CM

## 2025-05-21 PROCEDURE — 97110 THERAPEUTIC EXERCISES: CPT | Performed by: PHYSICAL THERAPIST

## 2025-05-21 PROCEDURE — 97140 MANUAL THERAPY 1/> REGIONS: CPT | Performed by: PHYSICAL THERAPIST

## 2025-05-21 NOTE — PROGRESS NOTES
"Daily Note     Today's date: 2025  Patient name: Delmy Candelario Check  : 1950  MRN: [id number]  Referring provider: Eloy Hinds DO  Dx:   Encounter Diagnosis     ICD-10-CM    1. Nondisplaced spiral fracture of shaft of left tibia, initial encounter for closed fracture  S82.245A           Start Time: 1420  Stop Time: 1500  Total time in clinic (min): 40 minutes    Subjective: Patient reports that her L knee continues to be painful as does her L ankle. Feeling pain with walking and use.      Objective: See treatment diary below    Ankle DF = 10 deg    Assessment: Tolerated treatment well. Patient reports less ankle pain post DF mob. Updated HEP to incorporate ankle DF mob and step ups to work in quad strength.       Plan: Continue per plan of care.      Precautions: standard       Manuals         Ankle PF stretch AB            Ankle inversion stretch AB AB AB AB         Ankle DF mob     AP glide G4        STM lateral ankle  AB           Knee ext mob   AB AB AB        Cuboid mob-plantar   AB          Neuro Re-Ed             SLS 10\"x5 10\"x5           TKE    BJB  2x10 [place] 10#  20x        Leg press     80#  3x10        Knee ext mach     20#  SL  3x10        SAQ    5#  2x10         Standing balance board AP/ML AP/ML  2x10 each           Standing wobble board  ML  2x10 Seated  Cir - CW and CCW  20x ea AP  DL  20x  2 sets         SLS   Foam - 10\"x5 HRx1          Gait drills   Stance phase  2'                                    Ther Ex             bike             Knee ext mach             HR DL 10x 3x10DL 3x10  DL   3x10 DL         Gastroc stretch 10\"x3            Ankle DF  mob 10x            VG DF and PF   L5  2x10 ea                                    Ther Activity                                       Gait Training                                       Modalities                                                  "

## 2025-05-28 ENCOUNTER — OFFICE VISIT (OUTPATIENT)
Dept: PHYSICAL THERAPY | Facility: REHABILITATION | Age: 75
End: 2025-05-28
Attending: STUDENT IN AN ORGANIZED HEALTH CARE EDUCATION/TRAINING PROGRAM
Payer: MEDICARE

## 2025-05-28 DIAGNOSIS — S82.245A NONDISPLACED SPIRAL FRACTURE OF SHAFT OF LEFT TIBIA, INITIAL ENCOUNTER FOR CLOSED FRACTURE: Primary | ICD-10-CM

## 2025-05-28 PROCEDURE — 97110 THERAPEUTIC EXERCISES: CPT | Performed by: PHYSICAL THERAPIST

## 2025-05-28 NOTE — PROGRESS NOTES
"Daily Note     Today's date: 2025  Patient name: Delmy Candelario Check  : 1950  MRN: [id number]  Referring provider: Eloy Hinds DO  Dx:   Encounter Diagnosis     ICD-10-CM    1. Nondisplaced spiral fracture of shaft of left tibia, initial encounter for closed fracture  S82.245A           Start Time: 1420  Stop Time: 1500  Total time in clinic (min): 40 minutes    Subjective: Patient reports that her knee does continue to be bothersome. Added ankle mobility exercise (DF mob) does improve her pain and gait. Knee biggest limitation.      Objective: See treatment diary below      Assessment: Tolerated treatment well. Patient was cued for focusing on TKE during mid to terminal stance phase of gait as this did result in improved pain and decreased limp. Provided education for home on gait mechanics along with resistance band for LAQs.       Plan: Continue per plan of care.      Precautions: standard       Manuals        Ankle PF stretch AB            Ankle inversion stretch AB AB AB AB         Ankle DF mob     AP glide G4        STM lateral ankle  AB           Knee ext mob   AB AB AB AB       Cuboid mob-plantar   AB          Neuro Re-Ed             SLS 10\"x5 10\"x5           TKE    BJB  2x10 [place] 10#  20x        Leg press     80#  3x10        Knee ext mach     20#  SL  3x10 LAQ  MTB+GTB  3x12       SAQ    5#  2x10         Standing balance board AP/ML AP/ML  2x10 each           Standing wobble board  ML  2x10 Seated  Cir - CW and CCW  20x ea AP  DL  20x  2 sets  AP/ML  2x20 ea       SLS   Foam - 10\"x5 HRx1          Gait drills   Stance phase  2'                                    Ther Ex             bike             Knee ext mach             HR DL 10x 3x10DL 3x10  DL   3x10 DL         Gastroc stretch 10\"x3            Ankle DF  mob 10x            VG DF and PF   L5  2x10 ea          VG      L7  3x20 DL                    Ther Activity                                     "   Gait Training                                       Modalities

## 2025-06-12 ENCOUNTER — TELEPHONE (OUTPATIENT)
Age: 75
End: 2025-06-12

## 2025-06-12 DIAGNOSIS — Z12.31 ENCOUNTER FOR SCREENING MAMMOGRAM FOR MALIGNANT NEOPLASM OF BREAST: Primary | ICD-10-CM

## 2025-06-12 NOTE — TELEPHONE ENCOUNTER
Please call and let the patient know that I did put an order for it.  But she should also know that there was 1 order for it from her GYN Dr. Foster as well.  Thank you

## 2025-06-12 NOTE — TELEPHONE ENCOUNTER
Patient called asking for her mammogram to be ordered. Please mail to patient once ordered. She has an appointment in July at [facility].

## 2025-06-12 NOTE — PROGRESS NOTES
"Daily Note     Today's date: 2025  Patient name: Delmy Candelario Check  : 1950  MRN: [id number]  Referring provider: Eloy Hinds DO  Dx:   Encounter Diagnosis     ICD-10-CM    1. Nondisplaced spiral fracture of shaft of left tibia, initial encounter for closed fracture  S82.245A           Start Time: 1005  Stop Time: 1050  Total time in clinic (min): 45 minutes    Subjective: Patient reports that she has felt less knee pain with watching how she walks. Ankle feeling okay, swelling at times is CC.      Objective: See treatment diary below      Assessment: Tolerated treatment well. Patient with improved gait mechanics since last visit. We performed steps today which she demonstrated compensation with but was able to improve with cuing. Educated patient on proper use of LLE throughout day with steps, STS for quad strain.       Plan: Continue per plan of care.      Precautions: standard       Manuals       Ankle PF stretch AB            Ankle inversion stretch AB AB AB AB         Ankle DF mob     AP glide G4        STM lateral ankle  AB           Knee ext mob   AB AB AB AB assessed      Cuboid mob-plantar   AB          Neuro Re-Ed             SLS 10\"x5 10\"x5           TKE    BJB  2x10 [place] 10#  20x        Leg press     80#  3x10        Knee ext mach     20#  SL  3x10 LAQ  MTB+GTB  3x12 20#  2x10DL  3x10 SL      Steps       FFx2  HRx1      STS       2x10      SAQ    5#  2x10         Standing balance board AP/ML AP/ML  2x10 each           Standing wobble board  ML  2x10 Seated  Cir - CW and CCW  20x ea AP  DL  20x  2 sets  AP/ML  2x20 ea       SLS   Foam - 10\"x5 HRx1          Gait drills   Stance phase  2'                                    Ther Ex             bike             Knee ext mach             HR DL 10x 3x10DL 3x10  DL   3x10 DL         Gastroc stretch 10\"x3            Ankle DF  mob 10x            VG DF and PF   L5  2x10 ea          VG      L7  3x20 DL   "                  Ther Activity                                       Gait Training                                       Modalities

## 2025-06-13 ENCOUNTER — OFFICE VISIT (OUTPATIENT)
Dept: PHYSICAL THERAPY | Facility: REHABILITATION | Age: 75
End: 2025-06-13
Attending: STUDENT IN AN ORGANIZED HEALTH CARE EDUCATION/TRAINING PROGRAM
Payer: MEDICARE

## 2025-06-13 DIAGNOSIS — S82.245A NONDISPLACED SPIRAL FRACTURE OF SHAFT OF LEFT TIBIA, INITIAL ENCOUNTER FOR CLOSED FRACTURE: Primary | ICD-10-CM

## 2025-06-13 PROCEDURE — 97110 THERAPEUTIC EXERCISES: CPT | Performed by: PHYSICAL THERAPIST

## 2025-06-13 PROCEDURE — 97140 MANUAL THERAPY 1/> REGIONS: CPT | Performed by: PHYSICAL THERAPIST

## 2025-06-24 ENCOUNTER — OFFICE VISIT (OUTPATIENT)
Dept: PHYSICAL THERAPY | Facility: REHABILITATION | Age: 75
End: 2025-06-24
Attending: STUDENT IN AN ORGANIZED HEALTH CARE EDUCATION/TRAINING PROGRAM
Payer: MEDICARE

## 2025-06-24 DIAGNOSIS — S82.245A NONDISPLACED SPIRAL FRACTURE OF SHAFT OF LEFT TIBIA, INITIAL ENCOUNTER FOR CLOSED FRACTURE: Primary | ICD-10-CM

## 2025-06-24 PROCEDURE — 97110 THERAPEUTIC EXERCISES: CPT | Performed by: PHYSICAL THERAPIST

## 2025-06-24 PROCEDURE — 97140 MANUAL THERAPY 1/> REGIONS: CPT | Performed by: PHYSICAL THERAPIST

## 2025-06-24 NOTE — PROGRESS NOTES
"Daily Note     Today's date: 2025  Patient name: Delmy Candelario Check  : 1950  MRN: [id number]  Referring provider: Eloy Hinds DO  Dx:   Encounter Diagnosis     ICD-10-CM    1. Nondisplaced spiral fracture of shaft of left tibia, initial encounter for closed fracture  S82.245A           Start Time: 1545  Stop Time: 1625  Total time in clinic (min): 40 minutes    Subjective: Patient reports that her knee is better but still bothersome. Ankle was sore yesterday.       Objective: See treatment diary below      Assessment: Tolerated treatment well. Patient demonstrates less limp when walking, especially when at terminal stance on LLE. Patient reported feeling well post session, knee more loose.       Plan: Continue per plan of care.      Precautions: standard       Manuals      Ankle PF stretch AB            Ankle inversion stretch AB AB AB AB         Ankle DF mob     AP glide G4        STM lateral ankle  AB           STM posterior knee        AB-10'     Knee ext mob   AB AB AB AB assessed AB-5'     Cuboid mob-plantar   AB          Neuro Re-Ed             SLS 10\"x5 10\"x5           TKE    BJB  2x10 [place] 10#  20x        Leg press     80#  3x10        Knee ext mach     20#  SL  3x10 LAQ  MTB+GTB  3x12 20#  2x10DL  3x10 SL 20#  2x10DL  3x10 SL     Steps       FFx2  HRx1      Prone HS curls        5#  2x10     STS       2x10 2x10     SAQ    5#  2x10         Standing balance board AP/ML AP/ML  2x10 each           Standing wobble board  ML  2x10 Seated  Cir - CW and CCW  20x ea AP  DL  20x  2 sets  AP/ML  2x20 ea       SLS   Foam - 10\"x5 HRx1          Gait drills   Stance phase  2'                                    Ther Ex             bike             Knee ext mach             HR DL 10x 3x10DL 3x10  DL   3x10 DL         Gastroc stretch 10\"x3            Ankle DF  mob 10x            VG DF and PF   L5  2x10 ea          VG      L7  3x20 DL                    Ther " Activity                                       Gait Training                                       Modalities

## 2025-07-01 ENCOUNTER — OFFICE VISIT (OUTPATIENT)
Dept: PHYSICAL THERAPY | Facility: REHABILITATION | Age: 75
End: 2025-07-01
Attending: STUDENT IN AN ORGANIZED HEALTH CARE EDUCATION/TRAINING PROGRAM
Payer: MEDICARE

## 2025-07-01 DIAGNOSIS — S82.245A NONDISPLACED SPIRAL FRACTURE OF SHAFT OF LEFT TIBIA, INITIAL ENCOUNTER FOR CLOSED FRACTURE: Primary | ICD-10-CM

## 2025-07-01 PROCEDURE — 97110 THERAPEUTIC EXERCISES: CPT | Performed by: PHYSICAL THERAPIST

## 2025-07-01 PROCEDURE — 97140 MANUAL THERAPY 1/> REGIONS: CPT | Performed by: PHYSICAL THERAPIST

## 2025-07-01 NOTE — PROGRESS NOTES
Reassessment and DC    Today's date: 2025  Patient name: Delmy Candelario Check  : 1950  MRN: [id number]  Referring provider: Eloy Hinds DO  Dx:   Encounter Diagnosis     ICD-10-CM    1. Nondisplaced spiral fracture of shaft of left tibia, initial encounter for closed fracture  S82.245A           Start Time: 1545  Stop Time: 1625  Total time in clinic (min): 40 minutes    Subjective: Patient reports that she feels better overall in her knee and ankle as walking has gotten better. Will have most difficulty going downs steps.       Objective: See treatment diary below    Knee ROM: -5-120  Ankle ROM: WNL with exception of DF (15 deg)/ CC 40 deg    Short Term Goals:   1. Patient will be Independent with hep  2. Patient will improve pain with activity by 50%  3. Patient will report GROC 50% or greater      Long Term Goals:   1. Patient will improve FOTO to greater then goal  2. Patient will improve pain with activity to 2/10 or less  3. Patient will continue with HEP independence to allow for decreased future reoccurrence of pain and loss in function  4. Patient will report GROC 75% or greater  5. Patient will amb without AD, normalized gait pattern  6. Patient will have normal ankle ROM  7. Patient will have 4/5 gastroc strength     GOALS MET/IMPROVED      Assessment: Patient is a 75 y.o. year old female who attended physical therapy for 9 treatment sessions regarding L ankle fracture and L knee pain. Patient reports  improvement at this time which correlates to improved impairments and functionality.  Patient has shown improvement throughout PT by demonstrating decreased pain, increased range of motion, increased strength, and improved tolerance to activity.  Patient continues to present with mild limitation in ankle mobility along with knee strength as she will transition to our fitness program a this time to continue with knee strengthening.         Plan: DC PT     Precautions: standard  "      Manuals 4/23 4/30 5/7 5/14 5/21 5/28 6/13 6/24 7/1    assessment         5'    Ankle PF stretch AB            Ankle inversion stretch AB AB AB AB         Ankle DF mob     AP glide G4        STM lateral ankle  AB           STM posterior knee        AB-10'     Knee ext mob   AB AB AB AB assessed AB-5' AB-10'    Cuboid mob-plantar   AB          Neuro Re-Ed 4/23            SLS 10\"x5 10\"x5           TKE    BJB  2x10 [place] 10#  20x        Leg press     80#  3x10        Knee ext mach     20#  SL  3x10 LAQ  MTB+GTB  3x12 20#  2x10DL  3x10 SL 20#  2x10DL  3x10 SL     Steps       FFx2  HRx1      Prone HS curls        5#  2x10     STS       2x10 2x10     SAQ    5#  2x10         Standing balance board AP/ML AP/ML  2x10 each           Standing wobble board  ML  2x10 Seated  Cir - CW and CCW  20x ea AP  DL  20x  2 sets  AP/ML  2x20 ea       SLS   Foam - 10\"x5 HRx1          Gait drills   Stance phase  2'                                    Ther Ex             Edu.'        10'     bike        2'     Leg press        60#  2x10     Knee ext mach        20#   2x10     HR DL 10x 3x10DL 3x10  DL   3x10 DL         Gastroc stretch 10\"x3            Ankle DF  mob 10x            VG DF and PF   L5  2x10 ea          VG      L7  3x20 DL                    Ther Activity                                       Gait Training                                       Modalities                                                          "

## 2025-07-10 ENCOUNTER — OFFICE VISIT (OUTPATIENT)
Dept: FAMILY MEDICINE CLINIC | Facility: CLINIC | Age: 75
End: 2025-07-10
Payer: MEDICARE

## 2025-07-10 VITALS
HEART RATE: 68 BPM | DIASTOLIC BLOOD PRESSURE: 66 MMHG | TEMPERATURE: 97.7 F | OXYGEN SATURATION: 99 % | SYSTOLIC BLOOD PRESSURE: 142 MMHG | RESPIRATION RATE: 16 BRPM | WEIGHT: 171.8 LBS | BODY MASS INDEX: 27.61 KG/M2 | HEIGHT: 66 IN

## 2025-07-10 DIAGNOSIS — Z13.1 SCREENING FOR DIABETES MELLITUS: ICD-10-CM

## 2025-07-10 DIAGNOSIS — Z82.49 FAMILY HISTORY OF EARLY CAD: ICD-10-CM

## 2025-07-10 DIAGNOSIS — Z00.00 MEDICARE ANNUAL WELLNESS VISIT, SUBSEQUENT: Primary | ICD-10-CM

## 2025-07-10 DIAGNOSIS — R22.42 LOCALIZED SWELLING OF LEFT LOWER LEG: ICD-10-CM

## 2025-07-10 DIAGNOSIS — Z86.19 HISTORY OF BABESIOSIS: ICD-10-CM

## 2025-07-10 DIAGNOSIS — Z13.820 SCREENING FOR OSTEOPOROSIS: ICD-10-CM

## 2025-07-10 DIAGNOSIS — Z13.220 LIPID SCREENING: ICD-10-CM

## 2025-07-10 DIAGNOSIS — M25.512 CHRONIC LEFT SHOULDER PAIN: ICD-10-CM

## 2025-07-10 DIAGNOSIS — R76.8 ANA POSITIVE: ICD-10-CM

## 2025-07-10 DIAGNOSIS — G89.29 CHRONIC LEFT SHOULDER PAIN: ICD-10-CM

## 2025-07-10 DIAGNOSIS — M81.8 OTHER OSTEOPOROSIS WITHOUT CURRENT PATHOLOGICAL FRACTURE: ICD-10-CM

## 2025-07-10 DIAGNOSIS — M89.9 DISORDER OF BONE, UNSPECIFIED: ICD-10-CM

## 2025-07-10 DIAGNOSIS — Z13.0 SCREENING, IRON DEFICIENCY ANEMIA: ICD-10-CM

## 2025-07-10 DIAGNOSIS — S82.245D CLOSED NONDISPLACED SPIRAL FRACTURE OF SHAFT OF LEFT TIBIA WITH ROUTINE HEALING: ICD-10-CM

## 2025-07-10 DIAGNOSIS — Z13.228 SCREENING FOR METABOLIC DISORDER: ICD-10-CM

## 2025-07-10 PROBLEM — R49.0 DYSPHONIA: Status: RESOLVED | Noted: 2021-01-15 | Resolved: 2025-07-10

## 2025-07-10 PROBLEM — K11.7 XEROSTOMIA: Status: RESOLVED | Noted: 2021-01-15 | Resolved: 2025-07-10

## 2025-07-10 PROBLEM — J38.3 VOCAL CORD ATROPHY: Status: RESOLVED | Noted: 2021-01-15 | Resolved: 2025-07-10

## 2025-07-10 PROBLEM — R13.14 PHARYNGOESOPHAGEAL DYSPHAGIA: Status: RESOLVED | Noted: 2021-01-15 | Resolved: 2025-07-10

## 2025-07-10 PROBLEM — N60.09 COMPLEX CYST OF BREAST: Status: ACTIVE | Noted: 2025-07-10

## 2025-07-10 PROBLEM — R49.0 MUSCLE TENSION DYSPHONIA: Status: RESOLVED | Noted: 2021-01-15 | Resolved: 2025-07-10
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
## 2025-07-10 PROBLEM — J38.01 PARESIS OF LEFT VOCAL FOLD: Status: RESOLVED | Noted: 2021-01-15 | Resolved: 2025-07-10

## 2025-07-10 PROBLEM — J34.89 DRY NOSE: Status: RESOLVED | Noted: 2021-04-04 | Resolved: 2025-07-10

## 2025-07-10 PROBLEM — J31.0 MIXED RHINITIS: Status: RESOLVED | Noted: 2021-01-15 | Resolved: 2025-07-10

## 2025-07-10 PROBLEM — M17.0 PRIMARY OSTEOARTHRITIS OF BOTH KNEES: Status: ACTIVE | Noted: 2025-07-10

## 2025-07-10 LAB — SL AMB POCT HEMOGLOBIN AIC: 5.6 (ref ?–6.5)

## 2025-07-10 PROCEDURE — 83036 HEMOGLOBIN GLYCOSYLATED A1C: CPT | Performed by: FAMILY MEDICINE

## 2025-07-10 PROCEDURE — 99214 OFFICE O/P EST MOD 30 MIN: CPT | Performed by: FAMILY MEDICINE

## 2025-07-10 PROCEDURE — G2211 COMPLEX E/M VISIT ADD ON: HCPCS | Performed by: FAMILY MEDICINE

## 2025-07-10 PROCEDURE — G0439 PPPS, SUBSEQ VISIT: HCPCS | Performed by: FAMILY MEDICINE

## 2025-07-10 RX ORDER — UREA 8.5 G/85G
CREAM TOPICAL
COMMUNITY
Start: 2024-12-31 | End: 2025-12-31

## 2025-07-10 NOTE — ASSESSMENT & PLAN NOTE
- Recent fracture sustained in February of this year from a standing height  - FRAX score: Major osteoporotic 23%, Hip Fracture 6.5% (w/o BMD)     Plan  - F/u labs and imaging  - Discuss treatment options with patient regarding anti-resorptive and anabolic agents   Orders:  •  Vitamin D 25 hydroxy; Future

## 2025-07-10 NOTE — ASSESSMENT & PLAN NOTE
- Notable history of   - Presently without any autoimmune symptoms   Orders:  •  RHEUMATOID FACTOR; Future  •  Cyclic citrul peptide antibody, IgG; Future

## 2025-07-10 NOTE — ASSESSMENT & PLAN NOTE
- Since fracture has been experiencing ongoing left leg swelling  - Denies any associated pain/discomfort   - Presently outside of the first 3 month elevated DVT risk window also having completed prophylaxis     Plan  - Continue to monitor  - Discussed compression stockings if desired

## 2025-07-10 NOTE — PATIENT INSTRUCTIONS
Look into getting a coronary artery calcium score ($100) to better quantify your risk of having a heart attack.     Medicare Preventive Visit Patient Instructions  Thank you for completing your Welcome to Medicare Visit or Medicare Annual Wellness Visit today. Your next wellness visit will be due in one year (7/11/2026).  The screening/preventive services that you may require over the next 5-10 years are detailed below. Some tests may not apply to you based off risk factors and/or age. Screening tests ordered at today's visit but not completed yet may show as past due. Also, please note that scanned in results may not display below.  Preventive Screenings:  Service Recommendations Previous Testing/Comments   Colorectal Cancer Screening  * Colonoscopy    * Fecal Occult Blood Test (FOBT)/Fecal Immunochemical Test (FIT)  * Fecal DNA/Cologuard Test  * Flexible Sigmoidoscopy Age: 45-75 years old   Colonoscopy: every 10 years (may be performed more frequently if at higher risk)  OR  FOBT/FIT: every 1 year  OR  Cologuard: every 3 years  OR  Sigmoidoscopy: every 5 years  Screening may be recommended earlier than age 45 if at higher risk for colorectal cancer. Also, an individualized decision between you and your healthcare provider will decide whether screening between the ages of 76-85 would be appropriate. Colonoscopy: 02/25/2021  FOBT/FIT: Not on file  Cologuard: Not on file  Sigmoidoscopy: Not on file    Screening Current     Breast Cancer Screening Age: 40+ years old  Frequency: every 1-2 years  Not required if history of left and right mastectomy Mammogram: 07/07/2025    Screening Current   Cervical Cancer Screening Between the ages of 21-29, pap smear recommended once every 3 years.   Between the ages of 30-65, can perform pap smear with HPV co-testing every 5 years.   Recommendations may differ for women with a history of total hysterectomy, cervical cancer, or abnormal pap smears in past. Pap Smear:  06/05/2024    Screening Not Indicated   Hepatitis C Screening Once for adults born between 1945 and 1965  More frequently in patients at high risk for Hepatitis C Hep C Antibody: Not on file        Diabetes Screening 1-2 times per year if you're at risk for diabetes or have pre-diabetes Fasting glucose: 74 mg/dL (7/3/2024)  A1C: 5.7 % (7/3/2024)  Screening Current   Cholesterol Screening Once every 5 years if you don't have a lipid disorder. May order more often based on risk factors. Lipid panel: 07/03/2024    Screening Not Indicated  History Lipid Disorder     Other Preventive Screenings Covered by Medicare:  Abdominal Aortic Aneurysm (AAA) Screening: covered once if your at risk. You're considered to be at risk if you have a family history of AAA.  Lung Cancer Screening: covers low dose CT scan once per year if you meet all of the following conditions: (1) Age 55-77; (2) No signs or symptoms of lung cancer; (3) Current smoker or have quit smoking within the last 15 years; (4) You have a tobacco smoking history of at least 20 pack years (packs per day multiplied by number of years you smoked); (5) You get a written order from a healthcare provider.  Glaucoma Screening: covered annually if you're considered high risk: (1) You have diabetes OR (2) Family history of glaucoma OR (3)  aged 50 and older OR (4)  American aged 65 and older  Osteoporosis Screening: covered every 2 years if you meet one of the following conditions: (1) You're estrogen deficient and at risk for osteoporosis based off medical history and other findings; (2) Have a vertebral abnormality; (3) On glucocorticoid therapy for more than 3 months; (4) Have primary hyperparathyroidism; (5) On osteoporosis medications and need to assess response to drug therapy.   Last bone density test (DXA Scan): Not on file.  HIV Screening: covered annually if you're between the age of 15-65. Also covered annually if you are younger than 15  and older than 65 with risk factors for HIV infection. For pregnant patients, it is covered up to 3 times per pregnancy.    Immunizations:  Immunization Recommendations   Influenza Vaccine Annual influenza vaccination during flu season is recommended for all persons aged >= 6 months who do not have contraindications   Pneumococcal Vaccine   * Pneumococcal conjugate vaccine = PCV13 (Prevnar 13), PCV15 (Vaxneuvance), PCV20 (Prevnar 20)  * Pneumococcal polysaccharide vaccine = PPSV23 (Pneumovax) Adults 19-65 yo with certain risk factors or if 65+ yo  If never received any pneumonia vaccine: recommend Prevnar 20 (PCV20)  Give PCV20 if previously received 1 dose of PCV13 or PPSV23   Hepatitis B Vaccine 3 dose series if at intermediate or high risk (ex: diabetes, end stage renal disease, liver disease)   Respiratory syncytial virus (RSV) Vaccine - COVERED BY MEDICARE PART D  * RSVPreF3 (Arexvy) CDC recommends that adults 60 years of age and older may receive a single dose of RSV vaccine using shared clinical decision-making (SCDM)   Tetanus (Td) Vaccine - COST NOT COVERED BY MEDICARE PART B Following completion of primary series, a booster dose should be given every 10 years to maintain immunity against tetanus. Td may also be given as tetanus wound prophylaxis.   Tdap Vaccine - COST NOT COVERED BY MEDICARE PART B Recommended at least once for all adults. For pregnant patients, recommended with each pregnancy.   Shingles Vaccine (Shingrix) - COST NOT COVERED BY MEDICARE PART B  2 shot series recommended in those 19 years and older who have or will have weakened immune systems or those 50 years and older     Health Maintenance Due:      Topic Date Due    Hepatitis C Screening  Never done    Colorectal Cancer Screening  02/25/2026    Breast Cancer Screening: Mammogram  07/07/2026     Immunizations Due:      Topic Date Due    COVID-19 Vaccine (4 - 2024-25 season) 09/01/2024    Influenza Vaccine (1) 09/01/2025     Advance  Directives   What are advance directives?  Advance directives are legal documents that state your wishes and plans for medical care. These plans are made ahead of time in case you lose your ability to make decisions for yourself. Advance directives can apply to any medical decision, such as the treatments you want, and if you want to donate organs.   What are the types of advance directives?  There are many types of advance directives, and each state has rules about how to use them. You may choose a combination of any of the following:  Living will:  This is a written record of the treatment you want. You can also choose which treatments you do not want, which to limit, and which to stop at a certain time. This includes surgery, medicine, IV fluid, and tube feedings.   Durable power of  for healthcare (DPAHC):  This is a written record that states who you want to make healthcare choices for you when you are unable to make them for yourself. This person, called a proxy, is usually a family member or a friend. You may choose more than 1 proxy.  Do not resuscitate (DNR) order:  A DNR order is used in case your heart stops beating or you stop breathing. It is a request not to have certain forms of treatment, such as CPR. A DNR order may be included in other types of advance directives.  Medical directive:  This covers the care that you want if you are in a coma, near death, or unable to make decisions for yourself. You can list the treatments you want for each condition. Treatment may include pain medicine, surgery, blood transfusions, dialysis, IV or tube feedings, and a ventilator (breathing machine).  Values history:  This document has questions about your views, beliefs, and how you feel and think about life. This information can help others choose the care that you would choose.  Why are advance directives important?  An advance directive helps you control your care. Although spoken wishes may be used, it  is better to have your wishes written down. Spoken wishes can be misunderstood, or not followed. Treatments may be given even if you do not want them. An advance directive may make it easier for your family to make difficult choices about your care.   Fall Prevention    Fall prevention  includes ways to make your home and other areas safer. It also includes ways you can move more carefully to prevent a fall. Health conditions that cause changes in your blood pressure, vision, or muscle strength and coordination may increase your risk for falls. Medicines may also increase your risk for falls if they make you dizzy, weak, or sleepy.   Fall prevention tips:   Stand or sit up slowly.    Use assistive devices as directed.    Wear shoes that fit well and have soles that .    Wear a personal alarm.    Stay active.    Manage your medical conditions.    Home Safety Tips:  Add items to prevent falls in the bathroom.    Keep paths clear.    Install bright lights in your home.    Keep items you use often on shelves within reach.    Paint or place reflective tape on the edges of your stairs.    Urinary Incontinence   Urinary incontinence (UI)  is when you lose control of your bladder. UI develops because your bladder cannot store or empty urine properly. The 3 most common types of UI are stress incontinence, urge incontinence, or both.  Medicines:   May be given to help strengthen your bladder control. Report any side effects of medication to your healthcare provider.  Do pelvic muscle exercises often:  Your pelvic muscles help you stop urinating. Squeeze these muscles tight for 5 seconds, then relax for 5 seconds. Gradually work up to squeezing for 10 seconds. Do 3 sets of 15 repetitions a day, or as directed. This will help strengthen your pelvic muscles and improve bladder control.  Train your bladder:  Go to the bathroom at set times, such as every 2 hours, even if you do not feel the urge to go. You can also try to  hold your urine when you feel the urge to go. For example, hold your urine for 5 minutes when you feel the urge to go. As that becomes easier, hold your urine for 10 minutes.   Self-care:   Keep a UI record.  Write down how often you leak urine and how much you leak. Make a note of what you were doing when you leaked urine.  Drink liquids as directed. You may need to limit the amount of liquid you drink to help control your urine leakage. Do not drink any liquid right before you go to bed. Limit or do not have drinks that contain caffeine or alcohol.   Prevent constipation.  Eat a variety of high-fiber foods. Good examples are high-fiber cereals, beans, vegetables, and whole-grain breads. Walking is the best way to trigger your intestines to have a bowel movement.  Exercise regularly and maintain a healthy weight.  Weight loss and exercise will decrease pressure on your bladder and help you control your leakage.   Use a catheter as directed  to help empty your bladder. A catheter is a tiny, plastic tube that is put into your bladder to drain your urine.   Go to behavior therapy as directed.  Behavior therapy may be used to help you learn to control your urge to urinate.    Weight Management   Why it is important to manage your weight:  Being overweight increases your risk of health conditions such as heart disease, high blood pressure, type 2 diabetes, and certain types of cancer. It can also increase your risk for osteoarthritis, sleep apnea, and other respiratory problems. Aim for a slow, steady weight loss. Even a small amount of weight loss can lower your risk of health problems.  How to lose weight safely:  A safe and healthy way to lose weight is to eat fewer calories and get regular exercise. You can lose up about 1 pound a week by decreasing the number of calories you eat by 500 calories each day.   Healthy meal plan for weight management:  A healthy meal plan includes a variety of foods, contains fewer  calories, and helps you stay healthy. A healthy meal plan includes the following:  Eat whole-grain foods more often.  A healthy meal plan should contain fiber. Fiber is the part of grains, fruits, and vegetables that is not broken down by your body. Whole-grain foods are healthy and provide extra fiber in your diet. Some examples of whole-grain foods are whole-wheat breads and pastas, oatmeal, brown rice, and bulgur.  Eat a variety of vegetables every day.  Include dark, leafy greens such as spinach, kale, johnny greens, and mustard greens. Eat yellow and orange vegetables such as carrots, sweet potatoes, and winter squash.   Eat a variety of fruits every day.  Choose fresh or canned fruit (canned in its own juice or light syrup) instead of juice. Fruit juice has very little or no fiber.  Eat low-fat dairy foods.  Drink fat-free (skim) milk or 1% milk. Eat fat-free yogurt and low-fat cottage cheese. Try low-fat cheeses such as mozzarella and other reduced-fat cheeses.  Choose meat and other protein foods that are low in fat.  Choose beans or other legumes such as split peas or lentils. Choose fish, skinless poultry (chicken or turkey), or lean cuts of red meat (beef or pork). Before you cook meat or poultry, cut off any visible fat.   Use less fat and oil.  Try baking foods instead of frying them. Add less fat, such as margarine, sour cream, regular salad dressing and mayonnaise to foods. Eat fewer high-fat foods. Some examples of high-fat foods include french fries, doughnuts, ice cream, and cakes.  Eat fewer sweets.  Limit foods and drinks that are high in sugar. This includes candy, cookies, regular soda, and sweetened drinks.  Exercise:  Exercise at least 30 minutes per day on most days of the week. Some examples of exercise include walking, biking, dancing, and swimming. You can also fit in more physical activity by taking the stairs instead of the elevator or parking farther away from stores. Ask your  healthcare provider about the best exercise plan for you.    © Copyright [employer] 2018 Information is for End User's use only and may not be sold, redistributed or otherwise used for commercial purposes. All illustrations and images included in CareNotes® are the copyrighted property of [employer] or [employer]

## 2025-07-10 NOTE — PROGRESS NOTES
Name: Delmy Candelario Check      : 1950      MRN: [id number]  Encounter Provider: Fabian Watkins DO  Encounter Date: 7/10/2025   Encounter department: [facility]    Assessment & Plan  Medicare annual wellness visit, subsequent  - Patient presenting to clinic for AWV with acute/chronic complaints detailed below   - Notable history of recent fragility fracture given fall from ground level resulting in a left tibial fracture; detailed below   Orders:  •  POCT hemoglobin A1c    Chronic left shoulder pain  - Notes ongoing left shoulder pain since Feb  - Notes decreased ROM with reduced abduction; has difficulty raising arm greater than 90 degrees   - Notes history of frozen shoulder in the 90's  - Requesting PT referral  Orders:  •  Ambulatory Referral to Physical Therapy; Future    Closed nondisplaced spiral fracture of shaft of left tibia with routine healing  - Recent fracture sustained in February of this year from a standing height  - FRAX score: Major osteoporotic 23%, Hip Fracture 6.5% (w/o BMD)     Plan  - F/u labs and imaging  - Discuss treatment options with patient regarding anti-resorptive and anabolic agents   Orders:  •  Vitamin D 25 hydroxy; Future    Localized swelling of left lower leg  - Since fracture has been experiencing ongoing left leg swelling  - Denies any associated pain/discomfort   - Presently outside of the first 3 month elevated DVT risk window also having completed prophylaxis     Plan  - Continue to monitor  - Discussed compression stockings if desired        MINI positive  - Notable history of   - Presently without any autoimmune symptoms   Orders:  •  RHEUMATOID FACTOR; Future  •  Cyclic citrul peptide antibody, IgG; Future    History of babesiosis  - Notable history of   - Patient has questionable recollection of treatment course regarding   Orders:  •  Blood Parasite smear; Future    Disorder of bone, unspecified  - Detailed above    Orders:  •  Vitamin D 25 hydroxy; Future       Preventive health issues were discussed with patient, and age appropriate screening tests were ordered as noted in patient's After Visit Summary. Personalized health advice and appropriate referrals for health education or preventive services given if needed, as noted in patient's After Visit Summary.    History of Present Illness     Patient presented to clinic for establish care/annual physical examination without any acute complaints or concerns at this time.  Patient has notable history of recent fragility fracture as noted on her left nondisplaced spiral fracture of the tibia sustained back in February from a ground-level fall.  Patient reports chronic ongoing left leg mild swelling since incident.  Patient otherwise noting chronic ongoing left shoulder pain with associate decreased range of motion for which she would like to follow PT regarding.       Patient Care Team:  Fabian Watikns DO as PCP - General (Family Medicine)    Review of Systems   Constitutional:  Negative for activity change, appetite change and fatigue.   HENT:  Positive for postnasal drip. Negative for congestion and rhinorrhea.    Respiratory:  Negative for cough, shortness of breath and wheezing.    Cardiovascular:  Positive for leg swelling (left leg; chronic). Negative for chest pain and palpitations.   Gastrointestinal:  Positive for abdominal pain (LLQ; hx of diverticulosis). Negative for constipation and diarrhea.   Genitourinary:  Negative for dysuria, vaginal bleeding and vaginal discharge.   Musculoskeletal:  Positive for arthralgias (left shoulder).   Skin:  Negative for color change and rash.     Medical History Reviewed by provider this encounter:  Tobacco  Allergies  Meds  Problems  Med Hx  Surg Hx  Fam Hx       Annual Wellness Visit Questionnaire   Delmy is here for her Subsequent Wellness visit.     Health Risk Assessment:   Patient rates overall health as  very good. Patient feels that their physical health rating is same. Patient is satisfied with their life. Eyesight was rated as same. Hearing was rated as same. Patient feels that their emotional and mental health rating is same. Patients states they are never, rarely angry. Patient states they are sometimes unusually tired/fatigued. Pain experienced in the last 7 days has been some. Patient's pain rating has been 4/10. Patient states that she has experienced weight loss or gain in last 6 months. Age related weight gain due to lack of consistent exercise.    Depression Screening:   PHQ-2 Score: 0      Fall Risk Screening:   In the past year, patient has experienced: history of falling in past year      Urinary Incontinence Screening:   Patient has leaked urine accidently in the last six months.     Home Safety:  Patient does not have trouble with stairs inside or outside of their home. Patient has working smoke alarms and has no working carbon monoxide detector. Home safety hazards include: none.     Nutrition:   Current diet is Regular and Limited junk food. I do like my desserts    Medications:   Patient is currently taking over-the-counter supplements. OTC medications include: see medication list. Patient is able to manage medications.     Activities of Daily Living (ADLs)/Instrumental Activities of Daily Living (IADLs):   Walk and transfer into and out of bed and chair?: Yes  Dress and groom yourself?: Yes    Bathe or shower yourself?: Yes    Feed yourself? Yes  Do your laundry/housekeeping?: Yes  Manage your money, pay your bills and track your expenses?: Yes  Make your own meals?: Yes    Do your own shopping?: Yes    Previous Hospitalizations:   Any hospitalizations or ED visits within the last 12 months?: Yes    How many hospitalizations have you had in the last year?: 1-2    Advance Care Planning:   Living will: No    Durable POA for healthcare: No    Advanced directive: No    ACP document given: Yes       Preventive Screenings      Cardiovascular Screening:    General: Screening Not Indicated and History Lipid Disorder      Diabetes Screening:     General: Screening Current      Colorectal Cancer Screening:     General: Screening Current      Breast Cancer Screening:     General: Screening Current      Cervical Cancer Screening:    General: Screening Not Indicated      Osteoporosis Screening:    General: Screening Not Indicated and History Osteoporosis      Lung Cancer Screening:     General: Screening Not Indicated    Immunizations:  - Immunizations due: Zoster (Shingrix)    Screening, Brief Intervention, and Referral to Treatment (SBIRT)     Screening  Typical number of drinks in a day: 0  Typical number of drinks in a week: 0  Interpretation: Low risk drinking behavior.    AUDIT-C Screenin) How often did you have a drink containing alcohol in the past year? monthly or less  2) How many drinks did you have on a typical day when you were drinking in the past year? 0  3) How often did you have 6 or more drinks on one occasion in the past year? never    AUDIT-C Score: 1  Interpretation: Score 0-2 (female): Negative screen for alcohol misuse    Single Item Drug Screening:  How often have you used an illegal drug (including marijuana) or a prescription medication for non-medical reasons in the past year? never    Single Item Drug Screen Score: 0  Interpretation: Negative screen for possible drug use disorder    Social Drivers of Health     Financial Resource Strain: Low Risk  (2022)    Overall Financial Resource Strain (CARDIA)    • Difficulty of Paying Living Expenses: Not hard at all   Food Insecurity: No Food Insecurity (2025)    Nursing - Inadequate Food Risk Classification    • Worried About Running Out of Food in the Last Year: Never true    • Ran Out of Food in the Last Year: Never true    • Ran Out of Food in the Last Year: Never true   Transportation Needs: No Transportation Needs (2025)  "   PRAPARE - Transportation    • Lack of Transportation (Medical): No    • Lack of Transportation (Non-Medical): No   Housing Stability: Low Risk  (7/9/2025)    Housing Stability Vital Sign    • Unable to Pay for Housing in the Last Year: No    • Number of Times Moved in the Last Year: 0    • Homeless in the Last Year: No   Utilities: Not At Risk (7/9/2025)    [facility] Utilities    • Threatened with loss of utilities: No     No results found.    Objective   /66 (BP Location: Left arm, Patient Position: Sitting, Cuff Size: Adult)   Pulse 68   Temp 97.7 °F (36.5 °C) (Temporal)   Resp 16   Ht 5' 6\" (1.676 m)   Wt 77.9 kg (171 lb 12.8 oz)   SpO2 99%   BMI 27.73 kg/m²     Physical Exam  Vitals and nursing note reviewed.   Constitutional:       General: She is not in acute distress.     Appearance: Normal appearance. She is well-developed. She is not ill-appearing.   HENT:      Head: Normocephalic and atraumatic.      Right Ear: Tympanic membrane, ear canal and external ear normal. There is no impacted cerumen.      Left Ear: Tympanic membrane, ear canal and external ear normal. There is no impacted cerumen.      Nose: Nose normal.     Eyes:      Conjunctiva/sclera: Conjunctivae normal.       Cardiovascular:      Rate and Rhythm: Normal rate and regular rhythm.      Pulses: Normal pulses.      Heart sounds: Normal heart sounds. No murmur heard.  Pulmonary:      Effort: Pulmonary effort is normal. No respiratory distress.      Breath sounds: Normal breath sounds.   Abdominal:      Palpations: Abdomen is soft.      Tenderness: There is no abdominal tenderness.     Musculoskeletal:         General: No swelling.      Cervical back: Neck supple.      Right lower leg: No edema.      Left lower leg: Edema (1+) present.     Skin:     General: Skin is warm and dry.      Capillary Refill: Capillary refill takes less than 2 seconds.     Neurological:      Mental Status: She is alert.     Psychiatric:         Mood and Affect: " Mood normal.

## 2025-07-14 ENCOUNTER — OFFICE VISIT (OUTPATIENT)
Dept: OBGYN CLINIC | Facility: CLINIC | Age: 75
End: 2025-07-14
Payer: MEDICARE

## 2025-07-14 ENCOUNTER — APPOINTMENT (OUTPATIENT)
Dept: RADIOLOGY | Facility: AMBULARY SURGERY CENTER | Age: 75
End: 2025-07-14
Attending: STUDENT IN AN ORGANIZED HEALTH CARE EDUCATION/TRAINING PROGRAM
Payer: MEDICARE

## 2025-07-14 VITALS — BODY MASS INDEX: 27.48 KG/M2 | WEIGHT: 171 LBS | HEIGHT: 66 IN

## 2025-07-14 DIAGNOSIS — S82.245A NONDISPLACED SPIRAL FRACTURE OF SHAFT OF LEFT TIBIA, INITIAL ENCOUNTER FOR CLOSED FRACTURE: ICD-10-CM

## 2025-07-14 PROCEDURE — 73610 X-RAY EXAM OF ANKLE: CPT

## 2025-07-14 PROCEDURE — 99213 OFFICE O/P EST LOW 20 MIN: CPT | Performed by: STUDENT IN AN ORGANIZED HEALTH CARE EDUCATION/TRAINING PROGRAM

## 2025-07-14 NOTE — PROGRESS NOTES
Orthopaedic Surgery - Office Note  Delmy Curiel (75 y.o. female)   : 1950   MRN: [id number]  Encounter Date: 2025    No chief complaint on file.    Past Surgical History:   Procedure Laterality Date    ORIF TIBIA & FIBULA FRACTURES Left 2025    Procedure: OPEN REDUCTION W/ INTERNAL FIXATION (ORIF) ANKLE and all associated procedures;  Surgeon: Eloy Hinds DO;  Location: [facility];  Service: Orthopedics     Assessment & Plan  Nondisplaced spiral fracture of shaft of left tibia, initial encounter for closed fracture  Assessment / Plan  Status post open reduction internal station of left distal intra-articular tibia fracture, date of surgery 2025        X-rays reviewed with the patient was demonstrated left open reduction internal fixation of the left distal intra-articular tibia fracture.  No signs of hardware loosening or failure.  Interval callus formation noted.  No change in alignment of the fracture.  Pt to be WBAT to LLE in a regular shoe  Continue ROM and strengthening  exercises with PT for strength and mobility training of the left lower extremity  Okay to come out of the boot for range of motion exercises when stationary  Continue Vitamin D and Calcium supplements  Completed aspirin 81 mg twice daily for a total of 6 weeks from the date of surgery  Follow up in 12 weeks for repeat Xrays of left ankle and repeat evaluation              History of Present Illness  Delmy Curiel is a 75 y.o. female who presents 2 weeks s/p ORIF Left tibial plafond. She is doing well and has remained NWB in a CAM boot. Denies numbness and tingling. She is in minimal pain at this time.  Denies any new traumatic injuries.  Currently residing at [facility].    Interval history 3/31/25  Pt presents 6 weeks s/p ORIF Left tibial plafond. She is doing well and has remained NWB in a CAM boot. She is doing well and denies any significant pain in the left lower extremity. She  continues tylenol for pain control.  She has been doing home PT and has regained ROM.  She denies any new numbness or paresthesias.     Interval history 5/12/2025  Patient presents proximately 3 months status post left tibial plafond ORIF.  Overall she is doing well and has been weightbearing as tolerated in regular shoe at this time.  She denies any significant pain in the extremity continues to take Tylenol mildly for pain control.  Continuing PT as well as well as doing her home exercise regimen.  Has regained most of her range of motion.  Denies any obvious traumas.  Denies any acute numbness paresthesias.    Interval history 7/14/2025  Patient presents approximately 5-month status post ORIF left ankle.  States she is doing well and has been weightbearing extremity reassured this time.  Denies any significant pain in the extremity and does not take any current pain medication.  Patient has been compliant with PT at this time is overall happy with her progression.  She denies any new injuries or traumas.  Denies any acute numbness or paresthesias.  Review of Systems  Pertinent items are noted in HPI.  All other systems were reviewed and are negative.    Physical Exam  There were no vitals taken for this visit.  Cons: Appears well.  No apparent distress.  Psych: Alert. Oriented x3.  Mood and affect normal.  Eyes: PERRLA, EOMI  Resp: Normal effort.  No audible wheezing or stridor.  CV: Palpable pulse.  No discernable arrhythmia.    Lymph:  No palpable cervical, axillary, or inguinal lymphadenopathy.  Skin: Warm.  No palpable masses.  No visible lesions.  Neuro: Normal muscle tone.  Normal and symmetric DTR's.     The left lower extremity was exposed and inspected. Visible skin intact without erythema, ecchymosis, effusion or obvious osseous deformity. No TTP jagjit-incisionally. Pt able to range from 10 degrees dorsiflexion to 40 degrees of plantarflexion.  No pain with external rotation of the ankle.  No tenderness  to palpation over the proximal fibula fracture.  Range of motion knee is from 0 to 120 degrees of flexion.  Sensation intact to superficial peroneal, deep peroneal, sural, saphenous, plantar nerve distributions. Motor intact to extensor hallux longus, tibialis anterior, gastrocnemius muscles, extensor mechanism intact. Limb is well perfused. Brisk capillary refill in all 5 digits. Compartments soft and compressible.      Studies Reviewed  X-rays of the left ankle demonstrate a distal third tibial shaft fracture with extension into the tibial plafond status post ORIF with no change in alignment or hardware loosening or failure.  Ankle mortise well-maintained.  Interval consolidation noted at the fracture site.  Less distinct fracture lines.      Procedures      Medical, Surgical, Family, and Social History  The patient's medical history, family history, and social history, were reviewed and updated as appropriate.    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           Family History   Problem Relation Name Age of Onset    Colon cancer Mother      Lung cancer Maternal Aunt      Brain cancer Maternal Uncle         Social History     Occupational History    Not on file   Tobacco Use    Smoking status: Never     Passive exposure: Current    Smokeless tobacco: Never   Vaping Use    Vaping status: Never Used   Substance and Sexual Activity    Alcohol use: Yes     Comment: SOCIAL     Drug use: Not Currently     Comment: FORMER RECREATIONAL DRUG USER     Sexual activity: Not Currently       Allergies   Allergen Reactions    Cat Dander Other (See Comments)     Itchy eyes     Dust Mite Extract Other (See Comments)     Itchy eyes          Current Outpatient Medications:     acetaminophen (TYLENOL) 325 mg  tablet, Take 3 tablets (975 mg total) by mouth every 8 (eight) hours (Patient taking differently: Take 2 tablets by mouth every 4 (four) hours as needed for mild pain for mild pain or fever per latest dci KV do not exceed 3000mg per 24 hours), Disp: , Rfl:     ascorbic acid (VITAMIN C) 500 mg tablet, Take 500 mg by mouth 2 (two) times a day., Disp: , Rfl:     aspirin (ECOTRIN LOW STRENGTH) 81 mg EC tablet, Take 1 tablet (81 mg total) by mouth 2 (two) times a day, Disp: 84 tablet, Rfl: 0    bisacodyl (Dulcolax) 10 mg suppository, Insert 10 mg into the rectum daily as needed for constipation. give on day 5 of no BM if MOM not effecttive per latest dci KV, Disp: , Rfl:     Cholecalciferol (Vitamin D) 50 MCG (2000 UT) tablet, Take 2,000 Units by mouth in the morning., Disp: , Rfl:     fluocinolone acetonide (DermOtic) 0.01 % otic oil, Administer 5 drops into both ears 2 (two) times a day (Patient taking differently: Administer 5 drops into both ears 2 (two) times a day as needed (itching) per latest dci KV), Disp: 20 mL, Rfl: 0    fluticasone (FLONASE) 50 mcg/act nasal spray, 1 spray into each nostril daily (Patient taking differently: 1 spray into each nostril daily as needed for rhinitis per latest dci KV), Disp: 9.9 mL, Rfl: 2    magnesium hydroxide (Milk of Magnesia) 400 mg/5 mL oral suspension, Take 30 mL by mouth daily as needed for constipation. on day four of no bm per latest dci KV, Disp: , Rfl:     Multiple Vitamin (multivitamin) capsule, Take 1 capsule by mouth in the morning., Disp: , Rfl:     polyethylene glycol (MIRALAX) 17 g packet, Take 17 g by mouth daily (Patient taking differently: Take 17 g by mouth daily Dissolve in 8 oz of water), Disp: , Rfl:     senna-docusate sodium (SENOKOT S) 8.6-50 mg per tablet, Take 1 tablet by mouth daily at bedtime, Disp: , Rfl:     urea (CARMOL) 10 % cream, Apply topically Q1MIN PRN for dry skin, Disp: , Rfl:       JAMES Saez Attestation      I,:   am  acting as a scribe while in the presence of the attending physician.:       I,:   personally performed the services described in this documentation    as scribed in my presence.:

## 2025-07-14 NOTE — ASSESSMENT & PLAN NOTE
Assessment / Plan  Status post open reduction internal station of left distal intra-articular tibia fracture, date of surgery 2/17/2025        X-rays reviewed with the patient was demonstrated left open reduction internal fixation of the left distal intra-articular tibia fracture.  No signs of hardware loosening or failure.  Interval callus formation noted.  No change in alignment of the fracture.  Pt to be WBAT to LLE in a regular shoe  Continue ROM and strengthening  exercises with PT for strength and mobility training of the left lower extremity  Okay to come out of the boot for range of motion exercises when stationary  Continue Vitamin D and Calcium supplements  Completed aspirin 81 mg twice daily for a total of 6 weeks from the date of surgery  Follow up in 12 weeks for repeat Xrays of left ankle and repeat evaluation

## 2025-07-18 PROBLEM — S82.245D: Status: ACTIVE | Noted: 2025-02-15

## 2025-07-21 ENCOUNTER — TELEPHONE (OUTPATIENT)
Dept: FAMILY MEDICINE CLINIC | Facility: CLINIC | Age: 75
End: 2025-07-21

## 2025-07-21 NOTE — TELEPHONE ENCOUNTER
----- Message from Fabian Watkins DO sent at 7/18/2025  6:41 PM EDT -----  Upon further review and calculation of this patients FRAX score, or 10 year risk of having a major fracture her risks are elevated enough to warrant osteoporosis pharmacologic therapy. This treatment is warranted regardless of her DXA scan results given her recent history of fracture from a standing height. Would be more than happy to discuss her treatment options in this regard at any time.

## 2025-08-11 ENCOUNTER — EVALUATION (OUTPATIENT)
Dept: PHYSICAL THERAPY | Facility: REHABILITATION | Age: 75
End: 2025-08-11
Attending: FAMILY MEDICINE
Payer: MEDICARE

## 2025-08-12 ENCOUNTER — RESULTS FOLLOW-UP (OUTPATIENT)
Dept: FAMILY MEDICINE CLINIC | Facility: CLINIC | Age: 75
End: 2025-08-12

## 2025-08-13 ENCOUNTER — TELEPHONE (OUTPATIENT)
Age: 75
End: 2025-08-13

## 2025-08-20 ENCOUNTER — OFFICE VISIT (OUTPATIENT)
Dept: PHYSICAL THERAPY | Facility: REHABILITATION | Age: 75
End: 2025-08-20
Attending: FAMILY MEDICINE
Payer: MEDICARE

## 2025-08-20 DIAGNOSIS — G89.29 CHRONIC LEFT SHOULDER PAIN: Primary | ICD-10-CM

## 2025-08-20 DIAGNOSIS — M25.512 CHRONIC LEFT SHOULDER PAIN: Primary | ICD-10-CM

## 2025-08-20 PROCEDURE — 97140 MANUAL THERAPY 1/> REGIONS: CPT | Performed by: PHYSICAL THERAPIST

## 2025-08-20 PROCEDURE — 97110 THERAPEUTIC EXERCISES: CPT | Performed by: PHYSICAL THERAPIST

## (undated) DEVICE — 2.5MM DRILL BIT QC 135MM 45MM CALIBRATION

## (undated) DEVICE — SPONGE SCRUB 4 PCT CHLORHEXIDINE

## (undated) DEVICE — PAD CAST 4 IN COTTON NON STERILE

## (undated) DEVICE — INSTRUMENT POUCH: Brand: CONVERTORS

## (undated) DEVICE — PREP PAD BNS: Brand: CONVERTORS

## (undated) DEVICE — 2.7MM CANNULATED DRILL BIT/QC 160MM

## (undated) DEVICE — SPLINT 5 X 30 IN FAST SET PLASTER

## (undated) DEVICE — CUFF TOURNIQUET 24 X 4 IN QUICK CONNECT DISP 1BLA

## (undated) DEVICE — BANDAGE, ESMARK LF STR 6"X9' (20/CS): Brand: CYPRESS

## (undated) DEVICE — 3.5MM DRILL BIT QC 150MM

## (undated) DEVICE — DRAPE C-ARMOUR

## (undated) DEVICE — 2.0MM DRILL BIT QC 110MM 30MM CALIBRATION

## (undated) DEVICE — GLOVE SRG BIOGEL 7.5

## (undated) DEVICE — DRAPE SHEET X-LG

## (undated) DEVICE — PADDING CAST 4 IN  COTTON STRL

## (undated) DEVICE — BONE WAX WHITE: Brand: BONE WAX WHITE

## (undated) DEVICE — ACE WRAP 6 IN UNSTERILE

## (undated) DEVICE — HEAVY DUTY TABLE COVER: Brand: CONVERTORS

## (undated) DEVICE — INTENDED FOR TISSUE SEPARATION, AND OTHER PROCEDURES THAT REQUIRE A SHARP SURGICAL BLADE TO PUNCTURE OR CUT.: Brand: BARD-PARKER SAFETY BLADES SIZE 15, STERILE

## (undated) DEVICE — DRAPE SHEET THREE QUARTER

## (undated) DEVICE — SUT ETHILON 2-0 PS 18 IN 585H

## (undated) DEVICE — C-ARM: Brand: UNBRANDED

## (undated) DEVICE — SUT MONOCRYL 2-0 SH 27 IN Y417H

## (undated) DEVICE — ELECTRODE BLADE MOD E-Z CLEAN  2.75IN 7CM -0012AM

## (undated) DEVICE — DISPOSABLE OR TOWEL: Brand: CARDINAL HEALTH

## (undated) DEVICE — CURITY NON-ADHERENT STRIPS: Brand: CURITY

## (undated) DEVICE — GLOVE INDICATOR PI UNDERGLOVE SZ 7.5 BLUE

## (undated) DEVICE — PENCIL ELECTROSURG E-Z CLEAN -0035H

## (undated) DEVICE — CHLORAPREP HI-LITE 26ML ORANGE

## (undated) DEVICE — GLOVE SRG BIOGEL 8

## (undated) DEVICE — ACE WRAP 4 IN UNSTERILE

## (undated) DEVICE — BETHLEHEM UNIVERSAL  MIONR EXT: Brand: CARDINAL HEALTH

## (undated) DEVICE — GLOVE INDICATOR PI UNDERGLOVE SZ 8 BLUE

## (undated) DEVICE — INTENDED FOR TISSUE SEPARATION, AND OTHER PROCEDURES THAT REQUIRE A SHARP SURGICAL BLADE TO PUNCTURE OR CUT.: Brand: BARD-PARKER SAFETY BLADES SIZE 10, STERILE